# Patient Record
Sex: MALE | Race: WHITE | NOT HISPANIC OR LATINO | Employment: OTHER | URBAN - METROPOLITAN AREA
[De-identification: names, ages, dates, MRNs, and addresses within clinical notes are randomized per-mention and may not be internally consistent; named-entity substitution may affect disease eponyms.]

---

## 2017-05-06 ENCOUNTER — PATIENT OUTREACH (OUTPATIENT)
Dept: HEALTH INFORMATION MANAGEMENT | Facility: OTHER | Age: 67
End: 2017-05-06

## 2017-05-11 DIAGNOSIS — K21.9 GASTROESOPHAGEAL REFLUX DISEASE, ESOPHAGITIS PRESENCE NOT SPECIFIED: ICD-10-CM

## 2017-05-11 DIAGNOSIS — E78.5 DYSLIPIDEMIA: ICD-10-CM

## 2017-05-11 DIAGNOSIS — I10 ESSENTIAL HYPERTENSION: ICD-10-CM

## 2017-05-11 RX ORDER — OMEPRAZOLE 40 MG/1
40 CAPSULE, DELAYED RELEASE ORAL DAILY
Qty: 90 CAP | Refills: 1 | Status: SHIPPED | OUTPATIENT
Start: 2017-05-11 | End: 2018-01-11 | Stop reason: SDUPTHER

## 2017-05-11 RX ORDER — LOSARTAN POTASSIUM AND HYDROCHLOROTHIAZIDE 25; 100 MG/1; MG/1
1 TABLET ORAL DAILY
Qty: 90 TAB | Refills: 1 | Status: SHIPPED | OUTPATIENT
Start: 2017-05-11 | End: 2017-11-27 | Stop reason: SDUPTHER

## 2017-05-11 RX ORDER — SIMVASTATIN 40 MG
40 TABLET ORAL EVERY EVENING
Qty: 90 TAB | Refills: 1 | Status: SHIPPED | OUTPATIENT
Start: 2017-05-11 | End: 2017-11-08

## 2017-05-19 NOTE — PROGRESS NOTES
Outcome: REQUESTED TO CALL US BACK -- Patient going out of town today and requested to call us back. Gave him our phone number. Pt does not have a PCP at this time. Possibly schedule new patient appointment when he call back. Call back in 3 weeks.    WebIZ Checked & Epic Updated:  yes    [facility] Verified: no    Attempt # 2nd

## 2017-10-16 DIAGNOSIS — E78.5 DYSLIPIDEMIA: ICD-10-CM

## 2017-11-08 ENCOUNTER — OFFICE VISIT (OUTPATIENT)
Dept: CARDIOLOGY | Facility: MEDICAL CENTER | Age: 67
End: 2017-11-08
Payer: MEDICARE

## 2017-11-08 VITALS
SYSTOLIC BLOOD PRESSURE: 138 MMHG | BODY MASS INDEX: 29.96 KG/M2 | DIASTOLIC BLOOD PRESSURE: 84 MMHG | HEIGHT: 71 IN | WEIGHT: 214 LBS | HEART RATE: 78 BPM | OXYGEN SATURATION: 93 %

## 2017-11-08 DIAGNOSIS — Z95.5 STENTED CORONARY ARTERY: ICD-10-CM

## 2017-11-08 DIAGNOSIS — I10 ESSENTIAL HYPERTENSION: ICD-10-CM

## 2017-11-08 DIAGNOSIS — E78.5 DYSLIPIDEMIA: ICD-10-CM

## 2017-11-08 PROCEDURE — 99214 OFFICE O/P EST MOD 30 MIN: CPT | Performed by: INTERNAL MEDICINE

## 2017-11-08 RX ORDER — ROSUVASTATIN CALCIUM 20 MG/1
20 TABLET, COATED ORAL EVERY EVENING
Qty: 30 TAB | Refills: 11 | Status: SHIPPED | OUTPATIENT
Start: 2017-11-08 | End: 2018-12-10 | Stop reason: SDUPTHER

## 2017-11-08 ASSESSMENT — ENCOUNTER SYMPTOMS
BRUISES/BLEEDS EASILY: 0
BLURRED VISION: 0
CHILLS: 0
NERVOUS/ANXIOUS: 0
DIZZINESS: 0
DEPRESSION: 0
COUGH: 0
PND: 0
EYE DISCHARGE: 0
HEADACHES: 0
HEARTBURN: 0
MYALGIAS: 0
SHORTNESS OF BREATH: 0
PALPITATIONS: 0
FEVER: 0
NAUSEA: 0

## 2017-11-08 NOTE — PROGRESS NOTES
Subjective:   Rodney Arias is a 67 y.o. male who presents todayFor follow-up for CAD with remote coronary stent.   Currently asymptomatic.  Although he does not come in very often he does take his medications  He does not check blood pressures very often.  Physically active hunting and fishing. No lab work in the last several years.    He has yet to get a colonoscopy    Past Medical History:   Diagnosis Date   • CAD (coronary artery disease) 10/21/2011   • Dyslipidemia 10/21/2011   • HTN (hypertension) 10/21/2011   • MI (myocardial infarction) 10/21/2011   • Myocardial infarction     with stent placement   • Stented coronary artery 10/21/2011     History reviewed. No pertinent surgical history.  History reviewed. No pertinent family history.  History   Smoking Status   • Never Smoker   Smokeless Tobacco   • Never Used     Allergies   Allergen Reactions   • Nkda [No Known Drug Allergy]      Outpatient Encounter Prescriptions as of 11/8/2017   Medication Sig Dispense Refill   • rosuvastatin (CRESTOR) 20 MG Tab Take 1 Tab by mouth every evening. 30 Tab 11   • omeprazole (PRILOSEC) 40 MG delayed-release capsule Take 1 Cap by mouth every day. Needs to be seen for further refills. Thank you 90 Cap 1   • losartan-hydrochlorothiazide (HYZAAR) 100-25 MG per tablet Take 1 Tab by mouth every day. Needs to be seen for further refills. Thank you 90 Tab 1   • Potassium 99 MG TABS Take 1 Tab by mouth every morning.     • aspirin 81 MG tablet Take 81 mg by mouth.     • [DISCONTINUED] simvastatin (ZOCOR) 40 MG Tab Take 1 Tab by mouth every evening. Needs to be seen for further refills. Thank you 90 Tab 1   • Chlorpheniramine Maleate (ALLERGY PO) Take  by mouth.     • nitroglycerin (NITROSTAT) 0.4 MG SL Tab Place 1 Tab under tongue as needed for Chest Pain. Take 1 tab every 5 min x 3, then call 911 for continued pain 25 Tab 2     No facility-administered encounter medications on file as of 11/8/2017.      Review of Systems  "  Constitutional: Negative for chills, fever and malaise/fatigue.   Eyes: Negative for blurred vision and discharge.   Respiratory: Negative for cough and shortness of breath.    Cardiovascular: Negative for chest pain, palpitations, leg swelling and PND.   Gastrointestinal: Negative for heartburn and nausea.   Genitourinary: Negative for dysuria and urgency.   Musculoskeletal: Negative for myalgias.   Skin: Negative for itching and rash.   Neurological: Negative for dizziness and headaches.   Endo/Heme/Allergies: Negative for environmental allergies. Does not bruise/bleed easily.   Psychiatric/Behavioral: Negative for depression. The patient is not nervous/anxious.         Objective:   /84   Pulse 78   Ht 1.803 m (5' 11\")   Wt 97.1 kg (214 lb)   SpO2 93%   BMI 29.85 kg/m²     Physical Exam   Constitutional: He is oriented to person, place, and time. He appears well-developed and well-nourished.   HENT:   Head: Normocephalic and atraumatic.   Eyes: Conjunctivae and EOM are normal. No scleral icterus.   Neck: Neck supple. No JVD present. No thyromegaly present.   Cardiovascular: Normal rate, regular rhythm and normal heart sounds.  Exam reveals no gallop and no friction rub.    No murmur heard.  Pulmonary/Chest: Effort normal and breath sounds normal. No respiratory distress. He has no wheezes. He has no rales. He exhibits no tenderness.   Abdominal: Soft. Bowel sounds are normal. He exhibits no distension and no mass. There is no tenderness.   Neurological: He is alert and oriented to person, place, and time. Coordination normal.   Skin: Skin is warm and dry. No rash noted. No pallor.   Psychiatric: He has a normal mood and affect. His behavior is normal. Judgment and thought content normal.       Assessment:     1. Essential hypertension     2. Stented coronary artery     3. Dyslipidemia  COMP METABOLIC PANEL    LIPID PANEL       Medical Decision Making:  Today's Assessment / Status / Plan:   In an " attempt to lower LDL further, will discontinue simvastatin.  Crestor 20 mg per day prescribed.  Lab work in 2 months.  Suggested occasional home blood pressures with systolic blood pressure target of 135 or less  I gave him the phone number for the GI group so he can schedule a colonoscopy   Follow-up annually

## 2017-11-27 DIAGNOSIS — I10 ESSENTIAL HYPERTENSION: ICD-10-CM

## 2017-11-28 RX ORDER — LOSARTAN POTASSIUM AND HYDROCHLOROTHIAZIDE 25; 100 MG/1; MG/1
1 TABLET ORAL DAILY
Qty: 90 TAB | Refills: 2 | Status: SHIPPED | OUTPATIENT
Start: 2017-11-28 | End: 2018-09-10 | Stop reason: SDUPTHER

## 2018-01-11 DIAGNOSIS — K21.9 GASTROESOPHAGEAL REFLUX DISEASE, ESOPHAGITIS PRESENCE NOT SPECIFIED: ICD-10-CM

## 2018-01-15 RX ORDER — OMEPRAZOLE 40 MG/1
40 CAPSULE, DELAYED RELEASE ORAL DAILY
Qty: 30 CAP | Refills: 0 | Status: SHIPPED | OUTPATIENT
Start: 2018-01-15 | End: 2018-02-26 | Stop reason: SDUPTHER

## 2018-01-18 DIAGNOSIS — E78.5 DYSLIPIDEMIA: ICD-10-CM

## 2018-02-26 DIAGNOSIS — K21.9 GASTROESOPHAGEAL REFLUX DISEASE, ESOPHAGITIS PRESENCE NOT SPECIFIED: ICD-10-CM

## 2018-02-26 RX ORDER — OMEPRAZOLE 40 MG/1
40 CAPSULE, DELAYED RELEASE ORAL DAILY
Qty: 90 CAP | Refills: 3 | Status: SHIPPED | OUTPATIENT
Start: 2018-02-26 | End: 2019-02-26 | Stop reason: SDUPTHER

## 2018-09-10 DIAGNOSIS — I10 ESSENTIAL HYPERTENSION: ICD-10-CM

## 2018-09-11 RX ORDER — LOSARTAN POTASSIUM AND HYDROCHLOROTHIAZIDE 25; 100 MG/1; MG/1
1 TABLET ORAL DAILY
Qty: 90 TAB | Refills: 0 | Status: SHIPPED | OUTPATIENT
Start: 2018-09-11 | End: 2018-12-31 | Stop reason: SDUPTHER

## 2018-12-11 RX ORDER — ROSUVASTATIN CALCIUM 20 MG/1
20 TABLET, COATED ORAL EVERY EVENING
Qty: 180 TAB | Refills: 0 | Status: SHIPPED | OUTPATIENT
Start: 2018-12-11 | End: 2019-02-26 | Stop reason: SDUPTHER

## 2018-12-31 DIAGNOSIS — I10 ESSENTIAL HYPERTENSION: ICD-10-CM

## 2019-01-02 RX ORDER — LOSARTAN POTASSIUM AND HYDROCHLOROTHIAZIDE 25; 100 MG/1; MG/1
1 TABLET ORAL DAILY
Qty: 90 TAB | Refills: 2 | Status: SHIPPED | OUTPATIENT
Start: 2019-01-02 | End: 2019-02-26 | Stop reason: SDUPTHER

## 2019-02-12 DIAGNOSIS — E78.5 DYSLIPIDEMIA: ICD-10-CM

## 2019-02-26 DIAGNOSIS — K21.9 GASTROESOPHAGEAL REFLUX DISEASE, ESOPHAGITIS PRESENCE NOT SPECIFIED: ICD-10-CM

## 2019-02-26 DIAGNOSIS — I10 ESSENTIAL HYPERTENSION: ICD-10-CM

## 2019-02-26 DIAGNOSIS — E78.5 HYPERLIPIDEMIA, UNSPECIFIED HYPERLIPIDEMIA TYPE: ICD-10-CM

## 2019-02-27 RX ORDER — ROSUVASTATIN CALCIUM 20 MG/1
20 TABLET, COATED ORAL EVERY EVENING
Qty: 180 TAB | Refills: 0 | Status: SHIPPED | OUTPATIENT
Start: 2019-02-27 | End: 2019-04-16 | Stop reason: SDUPTHER

## 2019-02-27 RX ORDER — LOSARTAN POTASSIUM AND HYDROCHLOROTHIAZIDE 25; 100 MG/1; MG/1
1 TABLET ORAL DAILY
Qty: 90 TAB | Refills: 0 | Status: SHIPPED | OUTPATIENT
Start: 2019-02-27 | End: 2019-04-16 | Stop reason: SDUPTHER

## 2019-02-27 RX ORDER — OMEPRAZOLE 40 MG/1
40 CAPSULE, DELAYED RELEASE ORAL DAILY
Qty: 90 CAP | Refills: 0 | Status: SHIPPED | OUTPATIENT
Start: 2019-02-27 | End: 2019-06-14 | Stop reason: SDUPTHER

## 2019-04-03 ENCOUNTER — TELEPHONE (OUTPATIENT)
Dept: CARDIOLOGY | Facility: MEDICAL CENTER | Age: 69
End: 2019-04-03

## 2019-04-03 NOTE — TELEPHONE ENCOUNTER
LVM on cell to let me know if he did recent labs and if so where so that I can call for them prior to his appointment with Dr Erwin.

## 2019-04-16 ENCOUNTER — OFFICE VISIT (OUTPATIENT)
Dept: CARDIOLOGY | Facility: MEDICAL CENTER | Age: 69
End: 2019-04-16
Payer: MEDICARE

## 2019-04-16 VITALS
OXYGEN SATURATION: 95 % | BODY MASS INDEX: 29.96 KG/M2 | HEART RATE: 74 BPM | SYSTOLIC BLOOD PRESSURE: 138 MMHG | WEIGHT: 214 LBS | DIASTOLIC BLOOD PRESSURE: 76 MMHG | HEIGHT: 71 IN

## 2019-04-16 DIAGNOSIS — E78.5 DYSLIPIDEMIA: ICD-10-CM

## 2019-04-16 DIAGNOSIS — E78.5 HYPERLIPIDEMIA, UNSPECIFIED HYPERLIPIDEMIA TYPE: ICD-10-CM

## 2019-04-16 DIAGNOSIS — I10 ESSENTIAL HYPERTENSION: ICD-10-CM

## 2019-04-16 DIAGNOSIS — I21.3 ST ELEVATION MYOCARDIAL INFARCTION (STEMI), UNSPECIFIED ARTERY (HCC): ICD-10-CM

## 2019-04-16 DIAGNOSIS — Z95.5 STENTED CORONARY ARTERY: ICD-10-CM

## 2019-04-16 DIAGNOSIS — I25.10 CORONARY ARTERY DISEASE DUE TO LIPID RICH PLAQUE: ICD-10-CM

## 2019-04-16 DIAGNOSIS — I25.83 CORONARY ARTERY DISEASE DUE TO LIPID RICH PLAQUE: ICD-10-CM

## 2019-04-16 PROCEDURE — 99214 OFFICE O/P EST MOD 30 MIN: CPT | Performed by: INTERNAL MEDICINE

## 2019-04-16 RX ORDER — NITROGLYCERIN 0.4 MG/1
0.4 TABLET SUBLINGUAL
COMMUNITY
Start: 2014-06-06 | End: 2019-04-16

## 2019-04-16 RX ORDER — ASPIRIN 81 MG/1
81 TABLET ORAL DAILY
COMMUNITY
Start: 2014-06-06 | End: 2023-11-07

## 2019-04-16 RX ORDER — ROSUVASTATIN CALCIUM 20 MG/1
20 TABLET, COATED ORAL EVERY EVENING
Qty: 90 TAB | Refills: 3 | Status: SHIPPED | OUTPATIENT
Start: 2019-04-16 | End: 2019-06-14 | Stop reason: SDUPTHER

## 2019-04-16 RX ORDER — LOSARTAN POTASSIUM AND HYDROCHLOROTHIAZIDE 25; 100 MG/1; MG/1
1 TABLET ORAL DAILY
Qty: 90 TAB | Refills: 3 | Status: SHIPPED | OUTPATIENT
Start: 2019-04-16 | End: 2019-06-14 | Stop reason: SDUPTHER

## 2019-04-16 RX ORDER — LOSARTAN POTASSIUM AND HYDROCHLOROTHIAZIDE 25; 100 MG/1; MG/1
TABLET ORAL
COMMUNITY
Start: 2014-06-06 | End: 2019-04-16

## 2019-04-16 RX ORDER — NITROGLYCERIN 0.4 MG/1
0.4 TABLET SUBLINGUAL PRN
Qty: 25 TAB | Refills: 2 | Status: SHIPPED | OUTPATIENT
Start: 2019-04-16 | End: 2021-10-14

## 2019-04-16 RX ORDER — OMEPRAZOLE 40 MG/1
40 CAPSULE, DELAYED RELEASE ORAL
COMMUNITY
Start: 2014-06-06 | End: 2019-04-16

## 2019-04-16 RX ORDER — SIMVASTATIN 40 MG
40 TABLET ORAL
COMMUNITY
Start: 2014-06-06 | End: 2019-04-16

## 2019-04-16 NOTE — PATIENT INSTRUCTIONS
Please look into the following diets and incorporate them into your diet    LOW SALT DIET   KEEP YOUR SODIUM EQUAL TO CALORIES AND NO MORE THAN DOUBLE THE CALORIES FOR A LOW SALT DIET    FOR TREATMENT OR PREVENTION OF CORONARY ARTERY DISEASE    [facility] - [facility]    [facility] Reversing [facility] - [facility]a's Cardiologist    [facility]    Dr Gurrola - [place] [facility] (book and documentary)      FOR TREATMENT OF BLOOD PRESSURE  DASH DIET - American Heart Association for treatment of HYPERTENSION    FOR TREATMENT OF BAD CHOLESTEROL/FATS  REDUCE PROCESSED SUGAR AS MUCH AS POSSIBLE  INCREASE WHOLE GRAINS/VEGETABLES    Lowering total cholesterol and LDL (bad) cholesterol:  - Eat leaner cuts of meat, or eliminate altogether if possible red meat, and frequently substitute fish or chicken.  - Limit saturated fat to no more than 7-10% of total calories no more than 10 g per day is recommended. Some sources of saturated fat include butter, animal fats, hydrogenated vegetable fats and oils, many desserts, whole milk dairy products.  - Replaced saturated fats with polyunsaturated fats and monounsaturated fats. Foods high in monounsaturated fat include nuts, although well, canola oil, avocados, and olives.  - Limit trans fat (processed foods) and replaced with fresh fruits and vegetables  - Recommend nonfat dairy products  - Increase substantially the amount of soluble fiber intake (legumes such as beans, fruit, whole grains).  - Consider nutritional supplements: plant sterile spreads such as Benecol, fish oil,  flaxseed oil, omega-3 acids capsules 1000 mg twice a day, or viscous fiber such as Metamucil  - Attain ideal weight and regular exercise (at least 30 minutes per day of walking)    Lowering triglycerides:  - Reduce intake of simple sugar: Desserts, candy, pastries, honey, sodas, sugared cereals, yogurt, Gatorade, sports bars, canned  fruit, smoothies, fruit juice, coffee drinks  - Reduced intake of refined starches: Refined Pasta  - Reduce or abstain from alcohol  - Increase omega-3 fatty acids: [place], Trout, Mackerel, [place]ring, [place] tuna and supplements  - Attain ideal weight and regular exercise (at least 30 minutes per day of walking)      Elevating HDL (good) cholesterol:  - Increase physical activity  - Seasoned foods with garlic and onions  - Increase omega-3 fatty acids and supplements as listed above  - Incorporating appropriate amounts of monounsaturated fats such as nuts, olive oil, canola oil, avocados, olives  - Stop smoking  - Attain ideal weight and regular exercise (at least 30 minutes per day of walking)

## 2019-04-22 ASSESSMENT — ENCOUNTER SYMPTOMS
FOCAL WEAKNESS: 0
BRUISES/BLEEDS EASILY: 0
SORE THROAT: 0
FEVER: 0
SHORTNESS OF BREATH: 0
DIZZINESS: 0
CHILLS: 0
NAUSEA: 0
FALLS: 0
ABDOMINAL PAIN: 0
PND: 0
CLAUDICATION: 0
COUGH: 0
PALPITATIONS: 0
BLURRED VISION: 0
WEAKNESS: 0

## 2019-04-22 NOTE — PROGRESS NOTES
Chief Complaint   Patient presents with   • Coronary Artery Disease     follow up lab results       Subjective:   Rodney Arias is a 68 y.o. male who presents today For follow-up of his history of coronary disease status post stenting he has been doing well over the past year no major issues there     Restarting his medications well no significant chest pain shortness of breath or CHF symptoms    Past Medical History:   Diagnosis Date   • CAD (coronary artery disease) 10/21/2011   • Dyslipidemia 10/21/2011   • HTN (hypertension) 10/21/2011   • MI (myocardial infarction) (HCC) 10/21/2011   • Myocardial infarction (HCC)     with stent placement   • Stented coronary artery 10/21/2011     Past Surgical History:   Procedure Laterality Date   • ANGIOPLASTY     • ZZZ CARDIAC CATH       Family History   Problem Relation Age of Onset   • Heart Disease Neg Hx         no premature CAD     Social History     Social History   • Marital status:      Spouse name: N/A   • Number of children: N/A   • Years of education: N/A     Occupational History   • Not on file.     Social History Main Topics   • Smoking status: Never Smoker   • Smokeless tobacco: Never Used   • Alcohol use Yes      Comment: beer   • Drug use: No   • Sexual activity: Not on file     Other Topics Concern   • Not on file     Social History Narrative   • No narrative on file     Allergies   Allergen Reactions   • Nkda [No Known Drug Allergy]      Outpatient Encounter Prescriptions as of 4/16/2019   Medication Sig Dispense Refill   • aspirin (ASA) 81 MG Chew Tab chewable tablet 81 mg.     • rosuvastatin (CRESTOR) 20 MG Tab Take 1 Tab by mouth every evening. 90 Tab 3   • losartan-hydrochlorothiazide (HYZAAR) 100-25 MG per tablet Take 1 Tab by mouth every day. 90 Tab 3   • nitroglycerin (NITROSTAT) 0.4 MG SL Tab Place 1 Tab under tongue as needed for Chest Pain. Take 1 tab every 5 min x 3, then call 911 for continued pain 25 Tab 2   • omeprazole (PRILOSEC) 40  MG delayed-release capsule Take 1 Cap by mouth every day. For further refills please contact new cardiologist. Thank you 90 Cap 0   • Potassium 99 MG TABS Take 1 Tab by mouth every morning.     • [DISCONTINUED] simvastatin (ZOCOR) 40 MG Tab Take 40 mg by mouth.     • [DISCONTINUED] losartan-hydrochlorothiazide (HYZAAR) 100-25 MG per tablet Take  by mouth.     • [DISCONTINUED] nitroglycerin (NITROSTAT) 0.4 MG SL Tab Place 0.4 mg under tongue.     • [DISCONTINUED] omeprazole (PRILOSEC) 40 MG delayed-release capsule Take 40 mg by mouth.     • [DISCONTINUED] Potassium 99 MG Tab Take  by mouth.     • [DISCONTINUED] losartan-hydrochlorothiazide (HYZAAR) 100-25 MG per tablet Take 1 Tab by mouth every day. For further refills please contact new cardiologist. Thank you 90 Tab 0   • [DISCONTINUED] rosuvastatin (CRESTOR) 20 MG Tab Take 1 Tab by mouth every evening. For further refills please contact new cardiologist. Thank you 180 Tab 0   • [DISCONTINUED] Chlorpheniramine Maleate (ALLERGY PO) Take  by mouth.     • [DISCONTINUED] nitroglycerin (NITROSTAT) 0.4 MG SL Tab Place 1 Tab under tongue as needed for Chest Pain. Take 1 tab every 5 min x 3, then call 911 for continued pain 25 Tab 2   • [DISCONTINUED] aspirin 81 MG tablet Take 81 mg by mouth.       No facility-administered encounter medications on file as of 4/16/2019.      Review of Systems   Constitutional: Negative for chills and fever.   HENT: Negative for sore throat.    Eyes: Negative for blurred vision.   Respiratory: Negative for cough and shortness of breath.    Cardiovascular: Negative for chest pain, palpitations, claudication, leg swelling and PND.   Gastrointestinal: Negative for abdominal pain and nausea.   Musculoskeletal: Negative for falls and joint pain.   Skin: Negative for rash.   Neurological: Negative for dizziness, focal weakness and weakness.   Endo/Heme/Allergies: Does not bruise/bleed easily.        Objective:   /76 (BP Location: Left arm,  "Patient Position: Sitting)   Pulse 74   Ht 1.803 m (5' 11\")   Wt 97.1 kg (214 lb)   SpO2 95%   BMI 29.85 kg/m²     Physical Exam   Constitutional: No distress.   HENT:   Mouth/Throat: Oropharynx is clear and moist. No oropharyngeal exudate.   Eyes: No scleral icterus.   Neck: No JVD present.   Cardiovascular: Normal rate and normal heart sounds.  Exam reveals no gallop and no friction rub.    No murmur heard.  Pulmonary/Chest: No respiratory distress. He has no wheezes. He has no rales.   Abdominal: Soft. Bowel sounds are normal.   Musculoskeletal: He exhibits no edema.   Neurological: He is alert.   Skin: No rash noted. He is not diaphoretic.   Psychiatric: He has a normal mood and affect.     We reviewed in person the recent labs      Assessment:     1. Coronary artery disease due to lipid rich plaque     2. Dyslipidemia     3. Essential hypertension  losartan-hydrochlorothiazide (HYZAAR) 100-25 MG per tablet   4. Stented coronary artery     5. Hyperlipidemia, unspecified hyperlipidemia type  rosuvastatin (CRESTOR) 20 MG Tab   6. ST elevation myocardial infarction (STEMI), unspecified artery (HCC)  nitroglycerin (NITROSTAT) 0.4 MG SL Tab       Medical Decision Making:  Today's Assessment / Status / Plan:     It was my pleasure to meet with Mr. Arias.    Blood pressure is well controlled.      He is on appropriate statin.    I will see Mr. Arias back in 1 year time and encouraged him to follow up with us over the phone or e-mail using my MyChart as issues arise.    It is my pleasure to participate in the care of Mr. Arias.  Please do not hesitate to contact me with questions or concerns.    Demian Erwin MD PhD FACC  Cardiologist [facility]    Please note that this dictation was created using voice recognition software. I have worked with consultants from the vendor as well as technical experts from formerly [facility] to optimize the interface. I have made every " reasonable attempt to correct obvious errors, but I expect that there are errors of grammar and possibly content I did not discover before finalizing the note.

## 2019-06-14 DIAGNOSIS — E78.5 HYPERLIPIDEMIA, UNSPECIFIED HYPERLIPIDEMIA TYPE: ICD-10-CM

## 2019-06-14 DIAGNOSIS — K21.9 GASTROESOPHAGEAL REFLUX DISEASE, ESOPHAGITIS PRESENCE NOT SPECIFIED: ICD-10-CM

## 2019-06-14 DIAGNOSIS — I10 ESSENTIAL HYPERTENSION: ICD-10-CM

## 2019-06-14 RX ORDER — ROSUVASTATIN CALCIUM 20 MG/1
20 TABLET, COATED ORAL EVERY EVENING
Qty: 90 TAB | Refills: 3 | Status: SHIPPED | OUTPATIENT
Start: 2019-06-14 | End: 2019-09-13 | Stop reason: SDUPTHER

## 2019-06-14 RX ORDER — OMEPRAZOLE 40 MG/1
40 CAPSULE, DELAYED RELEASE ORAL DAILY
Qty: 90 CAP | Refills: 3 | Status: SHIPPED | OUTPATIENT
Start: 2019-06-14 | End: 2021-10-08 | Stop reason: SDUPTHER

## 2019-06-14 RX ORDER — LOSARTAN POTASSIUM AND HYDROCHLOROTHIAZIDE 25; 100 MG/1; MG/1
1 TABLET ORAL DAILY
Qty: 90 TAB | Refills: 3 | Status: SHIPPED | OUTPATIENT
Start: 2019-06-14 | End: 2019-09-13 | Stop reason: SDUPTHER

## 2019-09-13 DIAGNOSIS — E78.5 HYPERLIPIDEMIA, UNSPECIFIED HYPERLIPIDEMIA TYPE: ICD-10-CM

## 2019-09-13 DIAGNOSIS — I10 ESSENTIAL HYPERTENSION: ICD-10-CM

## 2019-09-13 RX ORDER — LOSARTAN POTASSIUM AND HYDROCHLOROTHIAZIDE 25; 100 MG/1; MG/1
1 TABLET ORAL DAILY
Qty: 90 TAB | Refills: 0 | Status: SHIPPED | OUTPATIENT
Start: 2019-09-13 | End: 2020-05-15 | Stop reason: SDUPTHER

## 2019-09-13 RX ORDER — ROSUVASTATIN CALCIUM 20 MG/1
20 TABLET, COATED ORAL EVERY EVENING
Qty: 90 TAB | Refills: 0 | Status: SHIPPED | OUTPATIENT
Start: 2019-09-13 | End: 2020-05-15 | Stop reason: SDUPTHER

## 2020-05-15 ENCOUNTER — OFFICE VISIT (OUTPATIENT)
Dept: CARDIOLOGY | Facility: MEDICAL CENTER | Age: 70
End: 2020-05-15
Payer: MEDICARE

## 2020-05-15 VITALS
OXYGEN SATURATION: 96 % | DIASTOLIC BLOOD PRESSURE: 80 MMHG | BODY MASS INDEX: 29.9 KG/M2 | HEIGHT: 71 IN | SYSTOLIC BLOOD PRESSURE: 146 MMHG | WEIGHT: 213.6 LBS | HEART RATE: 86 BPM

## 2020-05-15 DIAGNOSIS — I10 ESSENTIAL HYPERTENSION: ICD-10-CM

## 2020-05-15 DIAGNOSIS — I25.10 CORONARY ARTERY DISEASE DUE TO LIPID RICH PLAQUE: ICD-10-CM

## 2020-05-15 DIAGNOSIS — Z95.5 STENTED CORONARY ARTERY: ICD-10-CM

## 2020-05-15 DIAGNOSIS — E78.5 HYPERLIPIDEMIA, UNSPECIFIED HYPERLIPIDEMIA TYPE: ICD-10-CM

## 2020-05-15 DIAGNOSIS — E78.5 DYSLIPIDEMIA: ICD-10-CM

## 2020-05-15 DIAGNOSIS — I25.83 CORONARY ARTERY DISEASE DUE TO LIPID RICH PLAQUE: ICD-10-CM

## 2020-05-15 PROBLEM — G47.33 OBSTRUCTIVE SLEEP APNEA: Status: ACTIVE | Noted: 2020-05-15

## 2020-05-15 PROCEDURE — 99214 OFFICE O/P EST MOD 30 MIN: CPT | Performed by: INTERNAL MEDICINE

## 2020-05-15 RX ORDER — LOSARTAN POTASSIUM AND HYDROCHLOROTHIAZIDE 25; 100 MG/1; MG/1
1 TABLET ORAL DAILY
Qty: 90 TAB | Refills: 0 | Status: SHIPPED | OUTPATIENT
Start: 2020-05-15 | End: 2020-09-22

## 2020-05-15 RX ORDER — ROSUVASTATIN CALCIUM 20 MG/1
20 TABLET, COATED ORAL EVERY EVENING
Qty: 90 TAB | Refills: 3 | Status: SHIPPED | OUTPATIENT
Start: 2020-05-15 | End: 2021-10-12 | Stop reason: SDUPTHER

## 2020-05-15 ASSESSMENT — ENCOUNTER SYMPTOMS
SORE THROAT: 0
BRUISES/BLEEDS EASILY: 0
FALLS: 0
SHORTNESS OF BREATH: 0
DIZZINESS: 0
BLURRED VISION: 0
ABDOMINAL PAIN: 0
COUGH: 0
PALPITATIONS: 0
CLAUDICATION: 0
PND: 0
WEAKNESS: 0
FEVER: 0
NAUSEA: 0
FOCAL WEAKNESS: 0
CHILLS: 0

## 2020-05-15 NOTE — PROGRESS NOTES
Chief Complaint   Patient presents with   • Follow-Up     CAD       Subjective:   Rodney Arias is a 70 y.o. male who presents today for follow-up of his history of coronary disease status post stenting remotely he has been doing well I see his wife    They travel a lot with RV have been doing well he established with new primary care    Past Medical History:   Diagnosis Date   • CAD (coronary artery disease) 10/21/2011   • Dyslipidemia 10/21/2011   • HTN (hypertension) 10/21/2011   • MI (myocardial infarction) (HCC) 10/21/2011   • Myocardial infarction (HCC)     with stent placement   • Stented coronary artery 10/21/2011     Past Surgical History:   Procedure Laterality Date   • ANGIOPLASTY     • ZZZ CARDIAC CATH       Family History   Problem Relation Age of Onset   • Heart Disease Neg Hx         no premature CAD     Social History     Socioeconomic History   • Marital status:      Spouse name: Not on file   • Number of children: Not on file   • Years of education: Not on file   • Highest education level: Not on file   Occupational History   • Not on file   Social Needs   • Financial resource strain: Not on file   • Food insecurity     Worry: Not on file     Inability: Not on file   • Transportation needs     Medical: Not on file     Non-medical: Not on file   Tobacco Use   • Smoking status: Never Smoker   • Smokeless tobacco: Never Used   Substance and Sexual Activity   • Alcohol use: Yes     Comment: beer   • Drug use: No   • Sexual activity: Not on file   Lifestyle   • Physical activity     Days per week: Not on file     Minutes per session: Not on file   • Stress: Not on file   Relationships   • Social connections     Talks on phone: Not on file     Gets together: Not on file     Attends Judaism service: Not on file     Active member of club or organization: Not on file     Attends meetings of clubs or organizations: Not on file     Relationship status: Not on file   • Intimate partner violence      "Fear of current or ex partner: Not on file     Emotionally abused: Not on file     Physically abused: Not on file     Forced sexual activity: Not on file   Other Topics Concern   • Not on file   Social History Narrative   • Not on file     Allergies   Allergen Reactions   • Nkda [No Known Drug Allergy]      Outpatient Encounter Medications as of 5/15/2020   Medication Sig Dispense Refill   • losartan-hydrochlorothiazide (HYZAAR) 100-25 MG per tablet Take 1 Tab by mouth every day. 90 Tab 0   • rosuvastatin (CRESTOR) 20 MG Tab Take 1 Tab by mouth every evening. 90 Tab 3   • omeprazole (PRILOSEC) 40 MG delayed-release capsule Take 1 Cap by mouth every day. 90 Cap 3   • aspirin (ASA) 81 MG Chew Tab chewable tablet 81 mg.     • nitroglycerin (NITROSTAT) 0.4 MG SL Tab Place 1 Tab under tongue as needed for Chest Pain. Take 1 tab every 5 min x 3, then call 911 for continued pain 25 Tab 2   • Potassium 99 MG TABS Take 1 Tab by mouth every morning.     • [DISCONTINUED] rosuvastatin (CRESTOR) 20 MG Tab Take 1 Tab by mouth every evening. 90 Tab 0   • [DISCONTINUED] losartan-hydrochlorothiazide (HYZAAR) 100-25 MG per tablet Take 1 Tab by mouth every day. 90 Tab 0     No facility-administered encounter medications on file as of 5/15/2020.      Review of Systems   Constitutional: Negative for chills and fever.   HENT: Negative for sore throat.    Eyes: Negative for blurred vision.   Respiratory: Negative for cough and shortness of breath.    Cardiovascular: Negative for chest pain, palpitations, claudication, leg swelling and PND.   Gastrointestinal: Negative for abdominal pain and nausea.   Musculoskeletal: Negative for falls and joint pain.   Skin: Negative for rash.   Neurological: Negative for dizziness, focal weakness and weakness.   Endo/Heme/Allergies: Does not bruise/bleed easily.        Objective:   /80 (BP Location: Left arm, Patient Position: Sitting, BP Cuff Size: Adult)   Pulse 86   Ht 1.803 m (5' 11\")   Wt " 96.9 kg (213 lb 9.6 oz)   SpO2 96%   BMI 29.79 kg/m²     Physical Exam   Constitutional: No distress.   HENT:   Mouth/Throat: Oropharynx is clear and moist. No oropharyngeal exudate.   Eyes: No scleral icterus.   Neck: No JVD present.   Cardiovascular: Normal rate and normal heart sounds. Exam reveals no gallop and no friction rub.   No murmur heard.  Pulmonary/Chest: No respiratory distress. He has no wheezes. He has no rales.   Abdominal: Soft. Bowel sounds are normal.   Musculoskeletal:         General: No edema.   Neurological: He is alert.   Skin: No rash noted. He is not diaphoretic.   Psychiatric: He has a normal mood and affect.     We reviewed in person the most recent labs    Assessment:     1. Essential hypertension  losartan-hydrochlorothiazide (HYZAAR) 100-25 MG per tablet    Comp Metabolic Panel   2. Hyperlipidemia, unspecified hyperlipidemia type  rosuvastatin (CRESTOR) 20 MG Tab   3. Dyslipidemia     4. Coronary artery disease due to lipid rich plaque     5. Stented coronary artery         Medical Decision Making:  Today's Assessment / Status / Plan:     It was my pleasure to meet with Mr. Arias.    Blood pressure is well controlled.  We specifically assessed the labs on hypertension treatment    He is on appropriate statin.    I will see Mr. Arias back in 1 year time and encouraged him to follow up with us over the phone or electronically using my MyChart as issues arise.    It is my pleasure to participate in the care of Mr. Arias.  Please do not hesitate to contact me with questions or concerns.    Demian Erwin MD PhD FAC  Cardiologist [facility]    Please note that this dictation was created using voice recognition software. There may be errors I did not discover before finalizing the note.

## 2020-05-21 ENCOUNTER — HOSPITAL ENCOUNTER (OUTPATIENT)
Dept: LAB | Facility: MEDICAL CENTER | Age: 70
End: 2020-05-21
Attending: INTERNAL MEDICINE
Payer: MEDICARE

## 2020-05-21 ENCOUNTER — TELEPHONE (OUTPATIENT)
Dept: CARDIOLOGY | Facility: MEDICAL CENTER | Age: 70
End: 2020-05-21

## 2020-05-21 ENCOUNTER — HOSPITAL ENCOUNTER (OUTPATIENT)
Dept: LAB | Facility: MEDICAL CENTER | Age: 70
End: 2020-05-21
Attending: STUDENT IN AN ORGANIZED HEALTH CARE EDUCATION/TRAINING PROGRAM
Payer: MEDICARE

## 2020-05-21 DIAGNOSIS — I10 ESSENTIAL HYPERTENSION: ICD-10-CM

## 2020-05-21 LAB
ALBUMIN SERPL BCP-MCNC: 4.6 G/DL (ref 3.2–4.9)
ALBUMIN/GLOB SERPL: 1.8 G/DL
ALP SERPL-CCNC: 55 U/L (ref 30–99)
ALT SERPL-CCNC: 20 U/L (ref 2–50)
ANION GAP SERPL CALC-SCNC: 14 MMOL/L (ref 7–16)
AST SERPL-CCNC: 19 U/L (ref 12–45)
BASOPHILS # BLD AUTO: 1.6 % (ref 0–1.8)
BASOPHILS # BLD: 0.09 K/UL (ref 0–0.12)
BILIRUB SERPL-MCNC: 0.2 MG/DL (ref 0.1–1.5)
BUN SERPL-MCNC: 14 MG/DL (ref 8–22)
CALCIUM SERPL-MCNC: 9.2 MG/DL (ref 8.5–10.5)
CHLORIDE SERPL-SCNC: 98 MMOL/L (ref 96–112)
CO2 SERPL-SCNC: 25 MMOL/L (ref 20–33)
CREAT SERPL-MCNC: 0.87 MG/DL (ref 0.5–1.4)
EOSINOPHIL # BLD AUTO: 0.19 K/UL (ref 0–0.51)
EOSINOPHIL NFR BLD: 3.4 % (ref 0–6.9)
ERYTHROCYTE [DISTWIDTH] IN BLOOD BY AUTOMATED COUNT: 42.2 FL (ref 35.9–50)
EST. AVERAGE GLUCOSE BLD GHB EST-MCNC: 111 MG/DL
FOLATE SERPL-MCNC: 11.9 NG/ML
GLOBULIN SER CALC-MCNC: 2.6 G/DL (ref 1.9–3.5)
GLUCOSE SERPL-MCNC: 93 MG/DL (ref 65–99)
HBA1C MFR BLD: 5.5 % (ref 0–5.6)
HCT VFR BLD AUTO: 47.9 % (ref 42–52)
HGB BLD-MCNC: 15.6 G/DL (ref 14–18)
IMM GRANULOCYTES # BLD AUTO: 0.03 K/UL (ref 0–0.11)
IMM GRANULOCYTES NFR BLD AUTO: 0.5 % (ref 0–0.9)
LYMPHOCYTES # BLD AUTO: 1.88 K/UL (ref 1–4.8)
LYMPHOCYTES NFR BLD: 33.5 % (ref 22–41)
MCH RBC QN AUTO: 29.7 PG (ref 27–33)
MCHC RBC AUTO-ENTMCNC: 32.6 G/DL (ref 33.7–35.3)
MCV RBC AUTO: 91.2 FL (ref 81.4–97.8)
MONOCYTES # BLD AUTO: 0.71 K/UL (ref 0–0.85)
MONOCYTES NFR BLD AUTO: 12.7 % (ref 0–13.4)
NEUTROPHILS # BLD AUTO: 2.71 K/UL (ref 1.82–7.42)
NEUTROPHILS NFR BLD: 48.3 % (ref 44–72)
NRBC # BLD AUTO: 0 K/UL
NRBC BLD-RTO: 0 /100 WBC
PLATELET # BLD AUTO: 239 K/UL (ref 164–446)
PMV BLD AUTO: 9.3 FL (ref 9–12.9)
POTASSIUM SERPL-SCNC: 4 MMOL/L (ref 3.6–5.5)
PROT SERPL-MCNC: 7.2 G/DL (ref 6–8.2)
RBC # BLD AUTO: 5.25 M/UL (ref 4.7–6.1)
SODIUM SERPL-SCNC: 137 MMOL/L (ref 135–145)
T4 FREE SERPL-MCNC: 1.16 NG/DL (ref 0.93–1.7)
TSH SERPL DL<=0.005 MIU/L-ACNC: 9.28 UIU/ML (ref 0.38–5.33)
VIT B12 SERPL-MCNC: 385 PG/ML (ref 211–911)
WBC # BLD AUTO: 5.6 K/UL (ref 4.8–10.8)

## 2020-05-21 PROCEDURE — 80053 COMPREHEN METABOLIC PANEL: CPT

## 2020-05-21 PROCEDURE — 85025 COMPLETE CBC W/AUTO DIFF WBC: CPT

## 2020-05-21 PROCEDURE — 36415 COLL VENOUS BLD VENIPUNCTURE: CPT

## 2020-05-21 PROCEDURE — 83036 HEMOGLOBIN GLYCOSYLATED A1C: CPT | Mod: GA

## 2020-05-21 PROCEDURE — 82746 ASSAY OF FOLIC ACID SERUM: CPT

## 2020-05-21 PROCEDURE — 84439 ASSAY OF FREE THYROXINE: CPT

## 2020-05-21 PROCEDURE — 82607 VITAMIN B-12: CPT

## 2020-05-21 PROCEDURE — 84443 ASSAY THYROID STIM HORMONE: CPT

## 2020-05-21 NOTE — TELEPHONE ENCOUNTER
Returned Kita's phone call and reviewed findings.  She had pointed out the expected date for draw was 11/15/2020.  Reviewed labs in chart as last labs were drawn 04/2019.  She verbalizes understanding and states no other concerns or questions at this time.  She is appreciative of information given.

## 2020-05-21 NOTE — TELEPHONE ENCOUNTER
"CW/hemant [facility] calling to report had draw at 6:30 this morning. Pt told Kita CW wanted this draw done \"anytime he wants\", Kita is asking for clarification of when CW intended for pt to be tested, now or a week prior to next appt, which has not yet been scheduled.  She will discard specimen if necessary.    Please call Kita to clarify instructions, [phone] until 2:30 today    "

## 2020-05-26 ENCOUNTER — TELEPHONE (OUTPATIENT)
Dept: CARDIOLOGY | Facility: MEDICAL CENTER | Age: 70
End: 2020-05-26

## 2020-05-26 NOTE — TELEPHONE ENCOUNTER
Associated Results   Result Notes for Comp Metabolic Panel   Notes recorded by Demian Erwin M.D. on 5/22/2020 at 10:17 AM PDT     Labs look good, please let him know     Thank you      Letter printed with MD recommendations and mailed to pt mailing address.

## 2020-09-19 DIAGNOSIS — I10 ESSENTIAL HYPERTENSION: ICD-10-CM

## 2020-09-22 RX ORDER — LOSARTAN POTASSIUM AND HYDROCHLOROTHIAZIDE 25; 100 MG/1; MG/1
TABLET ORAL
Qty: 90 TAB | Refills: 3 | Status: SHIPPED | OUTPATIENT
Start: 2020-09-22 | End: 2021-11-17 | Stop reason: SDUPTHER

## 2021-01-15 DIAGNOSIS — Z23 NEED FOR VACCINATION: ICD-10-CM

## 2021-04-28 ENCOUNTER — NURSE TRIAGE (OUTPATIENT)
Dept: HEALTH INFORMATION MANAGEMENT | Facility: OTHER | Age: 71
End: 2021-04-28

## 2021-04-28 NOTE — TELEPHONE ENCOUNTER
"Pt has been experiencing fatigue for about 3 months but it has progressively been getting a little worse. Advised PCP visit within 24 hours or UC.    Reason for Disposition  • MODERATE weakness (i.e., interferes with work, school, normal activities) and cause unknown    Additional Information  • Negative: SEVERE difficulty breathing (e.g., struggling for each breath, speaks in single words)  • Negative: Shock suspected (e.g., cold/pale/clammy skin, too weak to stand, low BP, rapid pulse)  • Negative: Difficult to awaken or acting confused (e.g., disoriented, slurred speech)  • Negative: Fainted > 15 minutes ago and still feels too weak or dizzy to stand  • Negative: SEVERE weakness (i.e., unable to walk or barely able to walk, requires support) and new onset or worsening  • Negative: Sounds like a life-threatening emergency to the triager  • Negative: Weakness of the face, arm or leg on one side of the body  • Negative: Has diabetes and weakness from low blood sugar (i.e., < 60 mg/dL or 3.5 mmol/L)  • Negative: Recent heat exposure, suspected cause of weakness  • Negative: Vomiting is the main symptom  • Negative: Diarrhea is the main symptom  • Negative: Difficulty breathing  • Negative: Heart beating < 50 beats per minute OR > 140 beats per minute  • Negative: Extra heartbeats OR irregular heart beating (i.e., 'palpitations')  • Negative: Follows bleeding (e.g., from vomiting, rectum, vagina)  • Negative: Bloody, black, or tarry bowel movements  • Negative: MODERATE weakness from poor fluid intake with no improvement after 2 hours of rest and fluids  • Negative: Drinking very little and dehydration suspected (e.g., no urine > 12 hours, very dry mouth, very lightheaded)  • Negative: Patient sounds very sick or weak to the triager    Answer Assessment - Initial Assessment Questions  1. DESCRIPTION: \"Describe how you are feeling.\"      fatigue  2. SEVERITY: \"How bad is it?\"  \"Can you stand and walk?\"    - MILD - " "Feels weak or tired, but does not interfere with work, school or normal activities    - MODERATE - Able to stand and walk; weakness interferes with work, school, or normal activities    - SEVERE - Unable to stand or walk      moderate  3. ONSET:  \"When did the weakness begin?\"      3 months  4. CAUSE: \"What do you think is causing the weakness?\"      unknown  5. MEDICINES: \"Have you recently started a new medicine or had a change in the amount of a medicine?\"      no  6. OTHER SYMPTOMS: \"Do you have any other symptoms?\" (e.g., chest pain, fever, cough, SOB, vomiting, diarrhea, bleeding, other areas of pain)      Sometimes gets winded  7. PREGNANCY: \"Is there any chance you are pregnant?\" \"When was your last menstrual period?\"      no    Protocols used: WEAKNESS (GENERALIZED) AND FATIGUE-A-OH      "

## 2021-05-18 ENCOUNTER — TELEPHONE (OUTPATIENT)
Dept: CARDIOLOGY | Facility: MEDICAL CENTER | Age: 71
End: 2021-05-18

## 2021-05-18 DIAGNOSIS — I25.83 CORONARY ARTERY DISEASE DUE TO LIPID RICH PLAQUE: ICD-10-CM

## 2021-05-18 DIAGNOSIS — I25.10 CORONARY ARTERY DISEASE DUE TO LIPID RICH PLAQUE: ICD-10-CM

## 2021-05-18 NOTE — TELEPHONE ENCOUNTER
CW    Dr Gonzales called to speak with CW regarding a test she ordered. Patient told her CW wanted to discuss the test prior to patient getting it done.      Thank you,    Ny AGUILAR

## 2021-05-19 NOTE — TELEPHONE ENCOUNTER
Attempted to call pt, unable to reach as voicemail is full on home number.  Left voicemail to return this call to discuss MD recommendations.

## 2021-05-19 NOTE — TELEPHONE ENCOUNTER
Spoke with Dr Gonzales.  Pt should get an ischemic eval but needs imaging, I ordered a treadmill nuc    Also he should follow through with sleep study    Please let him know    Thank you

## 2021-05-20 ENCOUNTER — OFFICE VISIT (OUTPATIENT)
Dept: CARDIOLOGY | Facility: MEDICAL CENTER | Age: 71
End: 2021-05-20
Payer: MEDICARE

## 2021-05-20 VITALS
BODY MASS INDEX: 31.64 KG/M2 | HEART RATE: 75 BPM | HEIGHT: 70 IN | WEIGHT: 221 LBS | DIASTOLIC BLOOD PRESSURE: 70 MMHG | SYSTOLIC BLOOD PRESSURE: 134 MMHG | OXYGEN SATURATION: 95 %

## 2021-05-20 DIAGNOSIS — I10 ESSENTIAL HYPERTENSION: ICD-10-CM

## 2021-05-20 DIAGNOSIS — I25.10 CORONARY ARTERY DISEASE DUE TO LIPID RICH PLAQUE: ICD-10-CM

## 2021-05-20 DIAGNOSIS — Z95.5 STENTED CORONARY ARTERY: ICD-10-CM

## 2021-05-20 DIAGNOSIS — I21.02 MYOCARDIAL INFARCTION INVOLVING LEFT ANTERIOR DESCENDING (LAD) CORONARY ARTERY, UNSPECIFIED MI TYPE (HCC): ICD-10-CM

## 2021-05-20 DIAGNOSIS — I25.83 CORONARY ARTERY DISEASE DUE TO LIPID RICH PLAQUE: ICD-10-CM

## 2021-05-20 DIAGNOSIS — E78.5 DYSLIPIDEMIA: ICD-10-CM

## 2021-05-20 PROCEDURE — 99214 OFFICE O/P EST MOD 30 MIN: CPT | Performed by: INTERNAL MEDICINE

## 2021-05-20 RX ORDER — LEVOTHYROXINE SODIUM 112 UG/1
TABLET ORAL
COMMUNITY
Start: 2021-04-15 | End: 2021-10-14

## 2021-05-20 RX ORDER — FAMOTIDINE 20 MG/1
TABLET, FILM COATED ORAL
COMMUNITY
Start: 2021-03-26 | End: 2022-04-12

## 2021-05-20 ASSESSMENT — ENCOUNTER SYMPTOMS
NAUSEA: 0
FOCAL WEAKNESS: 0
FALLS: 0
SORE THROAT: 0
BLURRED VISION: 0
DIZZINESS: 0
PALPITATIONS: 0
WEAKNESS: 0
BRUISES/BLEEDS EASILY: 0
COUGH: 0
CLAUDICATION: 0
SHORTNESS OF BREATH: 0
CHILLS: 0
FEVER: 0
ABDOMINAL PAIN: 0
PND: 0

## 2021-05-20 ASSESSMENT — FIBROSIS 4 INDEX: FIB4 SCORE: 1.3

## 2021-05-20 NOTE — LETTER
PROCEDURE/SURGERY CLEARANCE FORM      Encounter Date: 5/20/2021    Patient: Rodney Arias  YOB: 1950    CARDIOLOGIST:  Demian Erwin M.D.    The above patient is cleared to have the following procedure/surgery: stress test                                           It is my pleasure to participate in the care of Mr. Arias.  Please do not hesitate to contact me with questions or concerns. [facility] is available 24/7 for consultative services at [phone] in the perioperative period.    Electronically Signed    Demian Erwin MD PhD [facility]  Cardiologist [facility]    Please note that this dictation was created using voice recognition software. There may be errors I did not discover before finalizing the note.

## 2021-05-20 NOTE — PROGRESS NOTES
Chief Complaint   Patient presents with   • HTN (Controlled)   • Coronary Artery Disease   • MI (Non ST Segment Elevation MI)   • Dyslipidemia       Subjective:   Rodney Arias is a 71 y.o. male who presents today for follow-up of his history of coronary status post stenting remotely    He has been doing well over the past year tolerating his medications well    He is describing fatigue in the afternoon and had arranged a stress test for tomorrow    Past Medical History:   Diagnosis Date   • CAD (coronary artery disease) 10/21/2011   • Dyslipidemia 10/21/2011   • HTN (hypertension) 10/21/2011   • MI (myocardial infarction) (HCC) 10/21/2011   • Myocardial infarction (HCC)     with stent placement   • Stented coronary artery 10/21/2011     Past Surgical History:   Procedure Laterality Date   • ANGIOPLASTY     • ZZZ CARDIAC CATH       Family History   Problem Relation Age of Onset   • Heart Disease Neg Hx         no premature CAD     Social History     Socioeconomic History   • Marital status:      Spouse name: Not on file   • Number of children: Not on file   • Years of education: Not on file   • Highest education level: Not on file   Occupational History   • Not on file   Tobacco Use   • Smoking status: Never Smoker   • Smokeless tobacco: Never Used   Vaping Use   • Vaping Use: Never used   Substance and Sexual Activity   • Alcohol use: Yes     Comment: beer   • Drug use: No   • Sexual activity: Not on file   Other Topics Concern   • Not on file   Social History Narrative   • Not on file     Social Determinants of Health     Financial Resource Strain:    • Difficulty of Paying Living Expenses:    Food Insecurity:    • Worried About Running Out of Food in the Last Year:    • Ran Out of Food in the Last Year:    Transportation Needs:    • Lack of Transportation (Medical):    • Lack of Transportation (Non-Medical):    Physical Activity:    • Days of Exercise per Week:    • Minutes of Exercise per Session:     Stress:    • Feeling of Stress :    Social Connections:    • Frequency of Communication with Friends and Family:    • Frequency of Social Gatherings with Friends and Family:    • Attends [employer] Services:    • Active Member of Clubs or Organizations:    • Attends Club or Organization Meetings:    • Marital Status:    Intimate Partner Violence:    • Fear of Current or Ex-Partner:    • Emotionally Abused:    • Physically Abused:    • Sexually Abused:      Allergies   Allergen Reactions   • Nkda [No Known Drug Allergy]      Outpatient Encounter Medications as of 5/20/2021   Medication Sig Dispense Refill   • levothyroxine (SYNTHROID) 112 MCG Tab LEVOTHYROXINE SODIUM 112 MCG TABS     • b complex vitamins tablet B COMPLEX TABS     • losartan-hydrochlorothiazide (HYZAAR) 100-25 MG per tablet TAKE 1 TABLET BY MOUTH EVERY DAY 90 Tab 3   • rosuvastatin (CRESTOR) 20 MG Tab Take 1 Tab by mouth every evening. 90 Tab 3   • omeprazole (PRILOSEC) 40 MG delayed-release capsule Take 1 Cap by mouth every day. 90 Cap 3   • aspirin (ASA) 81 MG Chew Tab chewable tablet 81 mg.     • Potassium 99 MG TABS Take 1 Tab by mouth every morning.     • famotidine (PEPCID) 20 MG Tab TAKE 1 TABLET BY MOUTH DAILY     • nitroglycerin (NITROSTAT) 0.4 MG SL Tab Place 1 Tab under tongue as needed for Chest Pain. Take 1 tab every 5 min x 3, then call 911 for continued pain 25 Tab 2     No facility-administered encounter medications on file as of 5/20/2021.     Review of Systems   Constitutional: Negative for chills and fever.   HENT: Negative for sore throat.    Eyes: Negative for blurred vision.   Respiratory: Negative for cough and shortness of breath.    Cardiovascular: Negative for chest pain, palpitations, claudication, leg swelling and PND.   Gastrointestinal: Negative for abdominal pain and nausea.   Musculoskeletal: Negative for falls and joint pain.   Skin: Negative for rash.   Neurological: Negative for dizziness, focal weakness and  "weakness.   Endo/Heme/Allergies: Does not bruise/bleed easily.        Objective:   /70 (BP Location: Left arm, Patient Position: Sitting, BP Cuff Size: Adult)   Pulse 75   Ht 1.778 m (5' 10\")   Wt 100 kg (221 lb)   SpO2 95%   BMI 31.71 kg/m²     Physical Exam   Constitutional: No distress.   Eyes: No scleral icterus.   Neck: No JVD present.   Cardiovascular: Normal rate and normal heart sounds. Exam reveals no gallop and no friction rub.   No murmur heard.  Pulmonary/Chest: No respiratory distress. He has no wheezes. He has no rales.   Abdominal: Soft. Bowel sounds are normal.   Neurological: He is alert.   Skin: No rash noted. He is not diaphoretic.     We reviewed in person the most recent labs      Assessment:     1. Coronary artery disease due to lipid rich plaque  NM-CARDIAC STRESS TEST   2. Dyslipidemia     3. Essential hypertension     4. Stented coronary artery     5. Myocardial infarction involving left anterior descending (LAD) coronary artery, unspecified MI type (HCC)         Medical Decision Making:  Today's Assessment / Status / Plan:     It was my pleasure to meet with Mr. Arias.    We addressed the management of hypertension at today's visit. Blood pressure is well controlled.  We specifically assessed the labs on hypertension treatment    We addressed the management of dyslipidemia at today's visit. He is on appropriate statin.    Reasonable to screen for change in his coronary disease with his fatigue in the afternoons I also agree with primary care assessment that may be due to sleep apnea he is on treatment    I will see Mr. Arias back in 1 year time and encouraged him to follow up with us over the phone or electronically using my MyChart as issues arise.    It is my pleasure to participate in the care of Mr. Arias.  Please do not hesitate to contact me with questions or concerns.    Demian Erwin MD PhD FAC  Cardiologist [facility]    Please note that this dictation was created using voice recognition software. There may be errors I did not discover before finalizing the note.

## 2021-05-25 ENCOUNTER — TELEPHONE (OUTPATIENT)
Dept: CARDIOLOGY | Facility: MEDICAL CENTER | Age: 71
End: 2021-05-25

## 2021-06-01 ENCOUNTER — TELEPHONE (OUTPATIENT)
Dept: CARDIOLOGY | Facility: MEDICAL CENTER | Age: 71
End: 2021-06-01

## 2021-06-01 NOTE — TELEPHONE ENCOUNTER
CW    Pt called asking if CW could call him back to discuss the NM cardiac stress test that is scheduled for Friday June 4th.   Ph# [phone].    Thank you

## 2021-06-02 NOTE — TELEPHONE ENCOUNTER
"Returned pt call and reviewed pt concerns.  Pt states, \"I'm having problems with my left knee and I don't think I can get on the treadmill at this time.  It hurts one minute the next it's fine.  I was in a brace before.\"  Reassurance given that findings will be relayed to MD for advise on alternative testing.  Pt replied PCP already ordered chemical stress test the day after last OV and was waiting for someone to call back with results.  Advised pt to follow up with PCP as we did not order testing.  Reassurance given that findings will be relayed to MD if he has access to results as MD is not part of [facility].  He verbalizes understanding and states no other concerns or questions at this time.  Pt is appreciative of information given.    Findings relayed to MD for advise.  "

## 2021-06-02 NOTE — TELEPHONE ENCOUNTER
PT called back, they canceled the appt on Friday but still wants to to to you about this.    Thank you,  Shireen MENDOZA

## 2021-06-03 NOTE — TELEPHONE ENCOUNTER
Fax sent to [facility] to request for testing to be faxed to this office, [phone], completed status.     Awaiting for test results to be received.

## 2021-06-03 NOTE — TELEPHONE ENCOUNTER
Please get the report from [facility]   Non-Graft Cartilage Fenestration Text: The cartilage was fenestrated with a 2mm punch biopsy to help facilitate healing.

## 2021-06-04 ENCOUNTER — APPOINTMENT (OUTPATIENT)
Dept: RADIOLOGY | Facility: MEDICAL CENTER | Age: 71
End: 2021-06-04
Attending: INTERNAL MEDICINE
Payer: MEDICARE

## 2021-06-14 DIAGNOSIS — I25.83 CORONARY ARTERY DISEASE DUE TO LIPID RICH PLAQUE: ICD-10-CM

## 2021-06-14 DIAGNOSIS — I25.10 CORONARY ARTERY DISEASE DUE TO LIPID RICH PLAQUE: ICD-10-CM

## 2021-06-15 NOTE — TELEPHONE ENCOUNTER
Received notification of document scanned to pt chart, media tab.    Request to review testing relayed to MD for advise.

## 2021-10-08 DIAGNOSIS — K21.9 GASTROESOPHAGEAL REFLUX DISEASE: ICD-10-CM

## 2021-10-08 RX ORDER — OMEPRAZOLE 40 MG/1
40 CAPSULE, DELAYED RELEASE ORAL DAILY
Qty: 90 CAPSULE | Refills: 0 | Status: SHIPPED | OUTPATIENT
Start: 2021-10-08 | End: 2022-01-07

## 2021-10-08 NOTE — TELEPHONE ENCOUNTER
Patient's spouse lvm 10/08 stating patient is out of omeprazole and needs it by this weekend and needs it sent to the [facility] on [address]

## 2021-10-08 NOTE — TELEPHONE ENCOUNTER
Last seen: 05/17/21 by Dr. Meek  Next appt: None     Was the patient seen in the last year in this department? Yes   Does patient have an active prescription for medications requested? No   Received Request Via: Pharmacy

## 2021-10-12 DIAGNOSIS — E78.5 HYPERLIPIDEMIA, UNSPECIFIED HYPERLIPIDEMIA TYPE: ICD-10-CM

## 2021-10-13 RX ORDER — ROSUVASTATIN CALCIUM 20 MG/1
20 TABLET, COATED ORAL EVERY EVENING
Qty: 90 TABLET | Refills: 3 | Status: SHIPPED | OUTPATIENT
Start: 2021-10-13 | End: 2022-09-12 | Stop reason: SDUPTHER

## 2021-10-14 ENCOUNTER — OFFICE VISIT (OUTPATIENT)
Dept: INTERNAL MEDICINE | Facility: OTHER | Age: 71
End: 2021-10-14
Payer: MEDICARE

## 2021-10-14 VITALS
DIASTOLIC BLOOD PRESSURE: 84 MMHG | BODY MASS INDEX: 31.11 KG/M2 | WEIGHT: 222.2 LBS | TEMPERATURE: 98 F | HEART RATE: 78 BPM | HEIGHT: 71 IN | OXYGEN SATURATION: 95 % | SYSTOLIC BLOOD PRESSURE: 166 MMHG

## 2021-10-14 DIAGNOSIS — G47.33 OBSTRUCTIVE SLEEP APNEA: ICD-10-CM

## 2021-10-14 DIAGNOSIS — Z78.9 ALCOHOL USE: ICD-10-CM

## 2021-10-14 DIAGNOSIS — E66.9 OBESITY (BMI 30.0-34.9): ICD-10-CM

## 2021-10-14 DIAGNOSIS — K21.9 GASTROESOPHAGEAL REFLUX DISEASE, UNSPECIFIED WHETHER ESOPHAGITIS PRESENT: ICD-10-CM

## 2021-10-14 DIAGNOSIS — I25.83 CORONARY ARTERY DISEASE DUE TO LIPID RICH PLAQUE: ICD-10-CM

## 2021-10-14 DIAGNOSIS — I10 ESSENTIAL HYPERTENSION: ICD-10-CM

## 2021-10-14 DIAGNOSIS — E78.5 DYSLIPIDEMIA: ICD-10-CM

## 2021-10-14 DIAGNOSIS — Z12.12 SCREENING FOR COLORECTAL CANCER: ICD-10-CM

## 2021-10-14 DIAGNOSIS — E03.9 HYPOTHYROIDISM (ACQUIRED): ICD-10-CM

## 2021-10-14 DIAGNOSIS — I25.10 CORONARY ARTERY DISEASE DUE TO LIPID RICH PLAQUE: ICD-10-CM

## 2021-10-14 DIAGNOSIS — Z12.11 SCREENING FOR COLORECTAL CANCER: ICD-10-CM

## 2021-10-14 PROBLEM — F10.90 ALCOHOL USE: Status: ACTIVE | Noted: 2021-10-14

## 2021-10-14 PROCEDURE — 99214 OFFICE O/P EST MOD 30 MIN: CPT | Mod: GC | Performed by: STUDENT IN AN ORGANIZED HEALTH CARE EDUCATION/TRAINING PROGRAM

## 2021-10-14 RX ORDER — LEVOTHYROXINE SODIUM 112 UG/1
112 TABLET ORAL
Qty: 90 TABLET | Refills: 0 | Status: SHIPPED | OUTPATIENT
Start: 2021-10-14 | End: 2022-01-07

## 2021-10-14 RX ORDER — ROSUVASTATIN CALCIUM 20 MG/1
TABLET, COATED ORAL
COMMUNITY
Start: 2021-06-21 | End: 2021-10-14

## 2021-10-14 RX ORDER — LEVOTHYROXINE SODIUM 112 UG/1
TABLET ORAL
COMMUNITY
Start: 2021-04-15 | End: 2021-10-14

## 2021-10-14 ASSESSMENT — PATIENT HEALTH QUESTIONNAIRE - PHQ9: CLINICAL INTERPRETATION OF PHQ2 SCORE: 0

## 2021-10-14 ASSESSMENT — FIBROSIS 4 INDEX: FIB4 SCORE: 1.3

## 2021-10-14 NOTE — PATIENT INSTRUCTIONS
Please keep track of your blood pressure morning and evening readings after relaxing for 5 to 10 minutes prior to checking and keep a log for approximately 1 to 2 weeks and let us know what your blood pressures running. Continue the current medication for blood pressure for now, but may need to make adjustments if your blood pressure is not at goal of less than 140/90  Try to see Dr. Erwin the cardiologist prior to going to [place] and ask if you would benefit from a beta-blocker for your coronary artery disease  Your thyroid function is doing well and I will refill your thyroid medication  I would like you to get a CBC and a CMP labs at your convenience  We will refer you for a sleep study to see if you have sleep apnea and would benefit from a device to get more restful sleep.  Untreated sleep apnea can accelerate cardiovascular and pulmonary disease.    Please try to limit alcohol to 1 or 2 drinks daily no more than 14 drinks a week and consider having a drinks earlier in the day or early evening and not so close to bedtime as it may be interfering with her sleep     Get in touch with your gastroenterologist to set up a colonoscopy     Commend a 2-week nursing visit blood pressure follow-up  We will follow-up in April when you return from your trip

## 2021-10-15 NOTE — ASSESSMENT & PLAN NOTE
States he is compliant with his rosuvastatin  Tolerating medication well, will continue current regimen

## 2021-10-15 NOTE — PROGRESS NOTES
Established Patient    Rodney presents today with the following:    CC: Follow-up appointment    HPI: 71-year-old male presents for follow-up.  No acute complaints today.  He has a history of coronary disease status post stent in 1998 and is followed by cardiology annually.  He is compliant with his statin, ARB, and aspirin.  Tolerating medications well.  His thyroid function returned in a good range on his current Synthroid regimen.  Patient has been having trouble with waking up frequently in the middle of the night and sometimes waking up at 3 or 4 AM and not being able to go back to sleep.  He drinks 3-4 beers daily and sometimes close to bedtime.  He has also been diagnosed with obstructive sleep apnea in the past but did not continue treatment.  As result he feels fatigued during the day.  He has not been checking his blood pressure at home, but his blood pressure was elevated in the office today.  He had a colonoscopy 2 years ago and was advised to come back in 3 years.  Advised him to call and make an arrangement for a repeat screening colonoscopy and also placed a referral today.    Patient Active Problem List    Diagnosis Date Noted   • Alcohol use 10/14/2021   • Hypothyroidism (acquired) 10/14/2021   • Obstructive sleep apnea 05/15/2020   • Hypoxia 04/04/2013   • GERD (gastroesophageal reflux disease) 03/28/2013   • VERTIGO, BENIGN PAROXYSMAL 03/28/2013   • Coronary artery disease due to lipid rich plaque 10/21/2011   • MI (myocardial infarction) (HCC) 10/21/2011   • Stented coronary artery 10/21/2011   • Essential hypertension 10/21/2011   • Dyslipidemia 10/21/2011       Current Outpatient Medications   Medication Sig Dispense Refill   • levothyroxine (SYNTHROID) 112 MCG Tab Take 1 Tablet by mouth every morning on an empty stomach for 90 days. 90 Tablet 0   • rosuvastatin (CRESTOR) 20 MG Tab Take 1 Tablet by mouth every evening. 90 Tablet 3   • omeprazole (PRILOSEC) 40 MG delayed-release capsule  "Take 1 Capsule by mouth every day. 90 Capsule 0   • b complex vitamins tablet B COMPLEX TABS     • famotidine (PEPCID) 20 MG Tab TAKE 1 TABLET BY MOUTH DAILY     • losartan-hydrochlorothiazide (HYZAAR) 100-25 MG per tablet TAKE 1 TABLET BY MOUTH EVERY DAY 90 Tab 3   • aspirin (ASA) 81 MG Chew Tab chewable tablet 81 mg.     • Potassium 99 MG TABS Take 1 Tab by mouth every morning.       No current facility-administered medications for this visit.       ROS: As per HPI. Additional pertinent systems as noted below.  Constitutional: Negative for chills and fever.   HENT: Negative for ear pain and sore throat.    Eyes: Negative for discharge and redness.   Respiratory: Negative for cough, hemoptysis, wheezing and stridor.    Cardiovascular: Negative for chest pain, palpitations and leg swelling.   Gastrointestinal: Negative for abdominal pain, constipation, diarrhea, heartburn, nausea and vomiting. Heartburn well controlled on PPI  Genitourinary: Negative for dysuria, flank pain and hematuria.   Musculoskeletal: Negative for falls and myalgias.   Skin: Negative for itching and rash.   Neurological: Negative for dizziness, seizures, loss of consciousness and headaches.   Endo/Heme/Allergies: Negative for polydipsia. Does not bruise/bleed easily.   Psychiatric/Behavioral: Positive for alcohol abuse      BP (!) 166/84 (BP Location: Right arm, Patient Position: Sitting, BP Cuff Size: Adult)   Pulse 78   Temp 36.7 °C (98 °F)   Ht 1.803 m (5' 11\")   Wt 101 kg (222 lb 3.2 oz)   SpO2 95%   BMI 30.99 kg/m²     Physical Exam   Constitutional:  oriented to person, place, and time. No distress.   Eyes: extraocular motion intact. No scleral icterus.  Neck: Neck supple. No thyromegaly present.   Cardiovascular: Normal rate, regular rhythm and normal heart sounds.  Exam reveals no gallop and no friction rub.  No murmur heard.  Pulmonary/Chest: Breath sounds normal, no wheezing, rales, rhonchi  Musculoskeletal:   no edema. "   Neurological: alert and oriented to person, place, and time.   Skin: No cyanosis. Nails show no clubbing.    Note: I have reviewed all pertinent labs and diagnostic tests associated with this visit with specific comments listed under the assessment and plan below    Assessment and Plan    Alcohol use  She drinks 3-4 beers daily.  Sometimes he drinks close to bedtime.  He has been having issues with sleep.  He has come down from 5-6 beers previously.    Plan  Get a patient that recommended drinking as 1-2 drinks per day for male in no more than 14 drinks a week and advised that he is going to drink alcohol to have it earlier in the day and not close to bedtime to help with his sleep  Educated him on potential of liver disease and poor sleep related to alcohol use    Obstructive sleep apnea  Patient was diagnosed with NGHIA in the past and used CPAP device for short period of time.  However he recently had an incomplete home sleep study.  He is interested in repeating a sleep study and considering treatment as he is had daytime fatigue and elevated blood pressure.    Plan  We will refer patient for a lab sleep study    GERD (gastroesophageal reflux disease)  Symptoms controlled on omeprazole and will continue current regiment    Dyslipidemia  States he is compliant with his rosuvastatin  Tolerating medication well, will continue current regimen    Essential hypertension  Blood pressure elevated in the office today on 2 readings ranging 160s-170s systolic and 80s diastolic.  He has not been checking at home.  He is compliant with his losartan-hydrochlorothiazide combination.    Plan  Continue current medication and advised patient to keep a log of his blood pressure morning and evening for the next 2 weeks and then come back for separate nursing visit to reassess his blood pressure and also bring his pressure device for comparison to our in office device  Goal less than 130/80    Coronary artery disease due to lipid  rich plaque  Patient is taking daily aspirin, rosuvastatin, losartan-hydrochlorothiazide, and follows up with cardiology yearly.  He sees Dr. Erwin.    Plan  Advised patient to make his annual appointment with cardiology  Advised him to ask cardiologist if would be appropriate to start a beta-blocker given his coronary disease and elevated blood pressure  We will have him monitor his blood pressure closely at home and bring him back for a nursing blood pressure follow-up visit at which time we will need to adjust his medications as appropriate    Hypothyroidism (acquired)  Patient is taking Synthroid 112 MCG's daily.  His last TSH on 10/6/2021 was 6.180.  Free T4 was 1.43.  We will continue with current regiment      1. Essential hypertension  CBC WITH DIFFERENTIAL    Comp Metabolic Panel   2. Obstructive sleep apnea  Polysomnography, 4 or More    REFERRAL TO PULMONARY AND SLEEP MEDICINE   3. Coronary artery disease due to lipid rich plaque  Initial Annual Wellness Visit - Includes PPPS ()   4. Dyslipidemia  Initial Annual Wellness Visit - Includes PPPS ()   5. Gastroesophageal reflux disease, unspecified whether esophagitis present  Initial Annual Wellness Visit - Includes PPPS ()   6. Alcohol use  Initial Annual Wellness Visit - Includes PPPS ()   7. Hypothyroidism (acquired)     8. Obesity (BMI 30.0-34.9)  Patient identified as having weight management issue.  Appropriate orders and counseling given.   9. Screening for colorectal cancer  REFERRAL TO GI FOR COLONOSCOPY       Followup: Return in about 6 months (around 4/14/2022).      Signed by: Huy Meek M.D.

## 2021-10-15 NOTE — ASSESSMENT & PLAN NOTE
Patient is taking Synthroid 112 MCG's daily.  His last TSH on 10/6/2021 was 6.180.  Free T4 was 1.43.  We will continue with current regiment   Spoke with patient and informed provider reviewed records, can be seen as a consult with MFM but no need for LUIS EDUARDO.  Recommends patient see general OB service.  Also recommends FTS, reviewed with patient and declined. To continue care with Chiqui until LUIS EDUARDO to [facility] has been completed. Patient agreed.

## 2021-10-15 NOTE — ASSESSMENT & PLAN NOTE
She drinks 3-4 beers daily.  Sometimes he drinks close to bedtime.  He has been having issues with sleep.  He has come down from 5-6 beers previously.    Plan  Get a patient that recommended drinking as 1-2 drinks per day for male in no more than 14 drinks a week and advised that he is going to drink alcohol to have it earlier in the day and not close to bedtime to help with his sleep  Educated him on potential of liver disease and poor sleep related to alcohol use

## 2021-10-15 NOTE — ASSESSMENT & PLAN NOTE
Blood pressure elevated in the office today on 2 readings ranging 160s-170s systolic and 80s diastolic.  He has not been checking at home.  He is compliant with his losartan-hydrochlorothiazide combination.    Plan  Continue current medication and advised patient to keep a log of his blood pressure morning and evening for the next 2 weeks and then come back for separate nursing visit to reassess his blood pressure and also bring his pressure device for comparison to our in office device  Goal less than 130/80

## 2021-10-15 NOTE — ASSESSMENT & PLAN NOTE
Patient is taking daily aspirin, rosuvastatin, losartan-hydrochlorothiazide, and follows up with cardiology yearly.  He sees Dr. Erwin.    Plan  Advised patient to make his annual appointment with cardiology  Advised him to ask cardiologist if would be appropriate to start a beta-blocker given his coronary disease and elevated blood pressure  We will have him monitor his blood pressure closely at home and bring him back for a nursing blood pressure follow-up visit at which time we will need to adjust his medications as appropriate

## 2021-10-15 NOTE — ASSESSMENT & PLAN NOTE
Patient was diagnosed with NGHIA in the past and used CPAP device for short period of time.  However he recently had an incomplete home sleep study.  He is interested in repeating a sleep study and considering treatment as he is had daytime fatigue and elevated blood pressure.    Plan  We will refer patient for a lab sleep study

## 2021-11-02 ENCOUNTER — TELEPHONE (OUTPATIENT)
Dept: INTERNAL MEDICINE | Facility: OTHER | Age: 71
End: 2021-11-02

## 2021-11-02 ENCOUNTER — NON-PROVIDER VISIT (OUTPATIENT)
Dept: INTERNAL MEDICINE | Facility: OTHER | Age: 71
End: 2021-11-02
Payer: MEDICARE

## 2021-11-02 VITALS — DIASTOLIC BLOOD PRESSURE: 87 MMHG | SYSTOLIC BLOOD PRESSURE: 155 MMHG

## 2021-11-02 DIAGNOSIS — I10 ESSENTIAL HYPERTENSION: ICD-10-CM

## 2021-11-02 PROCEDURE — 99999 PR NO CHARGE: CPT | Performed by: INTERNAL MEDICINE

## 2021-11-02 NOTE — PROGRESS NOTES
Rodney Arias is a 71 y.o. male here for a non-provider visit for BP visit    Vitals:    11/02/21 0849 11/02/21 0851   BP: (!) 161/82 155/87   BP Location: Left arm Left arm   Patient Position: Sitting Sitting   BP Cuff Size: Large adult Large adult     If abnormal, was the Registered Nurse (office provider if RN is unavailable) notified today? Yes, Notified Dr. Long    Routed to PCP? Yes

## 2021-11-03 RX ORDER — CARVEDILOL 6.25 MG/1
6.25 TABLET ORAL 2 TIMES DAILY WITH MEALS
Qty: 180 TABLET | Refills: 0 | Status: SHIPPED | OUTPATIENT
Start: 2021-11-03 | End: 2022-02-14

## 2021-11-03 NOTE — TELEPHONE ENCOUNTER
Huy Meek M.D.  You 19 minutes ago (5:00 PM)   VG  What were his blood pressures running? Did he keep a home log too?     Message text

## 2021-11-04 NOTE — TELEPHONE ENCOUNTER
Went ahead and called patient. Let Alanna(wife) know about new medication and follow up if any side effects

## 2021-11-04 NOTE — TELEPHONE ENCOUNTER
Huy Meek M.D.  Nirmala Ornelas, Med Ass't  Caller: Unspecified (2 days ago, 11:08 AM)  Given his elevated blood pressure and history of coronary artery disease, I will start him on Coreg 6.125 mg twice daily.  Please advise patient to follow-up with us if any new side effects arise.  These can include dizziness or lightheadedness, lethargy, insomnia, hypertension, or sexual dysfunction.

## 2021-11-17 DIAGNOSIS — I10 ESSENTIAL HYPERTENSION: ICD-10-CM

## 2021-11-19 DIAGNOSIS — I10 ESSENTIAL HYPERTENSION: ICD-10-CM

## 2021-11-22 RX ORDER — LOSARTAN POTASSIUM AND HYDROCHLOROTHIAZIDE 25; 100 MG/1; MG/1
1 TABLET ORAL
Qty: 90 TABLET | Refills: 1 | OUTPATIENT
Start: 2021-11-22

## 2021-11-22 RX ORDER — LOSARTAN POTASSIUM AND HYDROCHLOROTHIAZIDE 25; 100 MG/1; MG/1
1 TABLET ORAL
Qty: 90 TABLET | Refills: 1 | Status: SHIPPED | OUTPATIENT
Start: 2021-11-22 | End: 2021-12-02 | Stop reason: SDUPTHER

## 2021-12-01 NOTE — TELEPHONE ENCOUNTER
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 female presenting for routine follow up visit of her limited stage small cell lung carcinoma.  She has a remote history of moderately differentiated squamous cell carcinoma of the right lower lobe of lung, status post right thoracotomy and lymph node sampling in 2019.  More recently, she was found to have an 11 mm lobulated right upper lobe nodule identified on CT scan of the chest.  PET/CT scan 2021 demonstrated abnormal hypermetabolism in the bilateral axilla and posterior right lung mass, as well as increased uptake in the hilar regions felt to likely be reactive.  FNA biopsy of the lung mass demonstrated small cell carcinoma.  Chest wall mass was biopsied and nondiagnostic.  Bronchoscopy on 3/17/2021 revealed metastatic small cell lung cancer in station 4R and 11 R lymph nodes.  Biopsies of station 11 L and 7 were negative for malignant cells.  The axillary adenopathy was felt to be inflammatory.  MRI of the brain was negative for metastatic disease.  She was started on definitive treatment with concurrent chemoradiotherapy.  Initially, the primary lung mass was treated to a dose of 16 Gy in 8 fractions.  Treatment was interrupted for a hospitalization x several weeks in May related to acute respiratory failure, followed by a prolonged rehabilitation stay.  Her last dose of chemotherapy was 2021.  Repeat PET/CT scan 2021 showed improvement in the treated region of the chest, with questionable activity in the left sternoclavicular joint and no evidence of  Pt came in today for his nurse visit. Bp was still on the higher end and let dr amado know. She asked for patient to come in sooner than his scheduled April appt but he said he could not make it in before that as he will be leaving out of state until winter is over and that is in April that he will be back in. He would like Dr. Meek to call him if possible with medication changes if needed   metastatic disease elsewhere.  She then returned for radiotherapy alone and was replanned.  She resumed treatment to the right lung mass to an additional 44 Gy in 22 fractions to complete a cumulative dose of 60 Gy.  Since treatment, she reports stable dyspnea and continues to remain on 3.5-4L supplemental oxygen continuously.  She reports chronic productive cough and takes Mucinex daily.  Occasional chest-wall pain and nausea, particularly with fits of coughing, are stable.  She denies hemoptysis, esophagitis, fever, chills, or weight loss.  Generalized pain in the setting of RA is stable.  She notes daily headaches which are aching and typically stacey with prn Tylenol.  She otherwise denies vision changes, focal weakness or paresthesias.  She is wheelchair dependent and has a lift chair in her living room.  She is independent with certain ADLs such as dressing and toileting, though requires assistance with cooking and bathing.  She denies further acute concerns today.  She returns today following restaging imaging studies with CT scan of the chest, MRI brain, and PET scan performed at an outside facility.      MEDICATIONS: Medication reconciliation for the patient was reviewed and confirmed in the electronic medical record.    Review of Systems   Constitutional: Positive for fatigue.   Respiratory: Positive for cough and shortness of breath.    Cardiovascular: Positive for chest pain and leg swelling.   Musculoskeletal: Positive for gait problem (wheelchair bound).   Neurological: Positive for dizziness, gait problem (wheelchair bound) and headaches.   Psychiatric/Behavioral: The patient is nervous/anxious.    All other systems reviewed and are negative.          KPS 70%      Physical Exam  Vitals and nursing note reviewed.   Constitutional:       General: She is not in acute distress.     Appearance: She is well-developed. She is obese.      Comments: Chronically ill-appearing female in no apparent distress  "sitting upright in wheelchair   HENT:      Head: Normocephalic and atraumatic.   Eyes:      Conjunctiva/sclera: Conjunctivae normal.      Pupils: Pupils are equal, round, and reactive to light.   Cardiovascular:      Rate and Rhythm: Normal rate and regular rhythm.      Heart sounds: No murmur heard.  No friction rub.   Pulmonary:      Effort: Pulmonary effort is normal.      Breath sounds: Normal breath sounds. No wheezing.      Comments: 4L O2 via NC.  Diminished lung sounds throughout.  Chest:      Chest wall: No tenderness.   Abdominal:      General: Bowel sounds are normal. There is no distension.      Palpations: Abdomen is soft. There is no mass.      Tenderness: There is no abdominal tenderness.   Musculoskeletal:         General: Normal range of motion.      Cervical back: Normal range of motion and neck supple.      Right lower leg: Edema (1+) present.      Left lower leg: Edema (1+) present.   Lymphadenopathy:      Cervical: No cervical adenopathy.   Skin:     General: Skin is warm and dry.   Neurological:      General: No focal deficit present.      Mental Status: She is alert and oriented to person, place, and time.      Cranial Nerves: No cranial nerve deficit.      Sensory: No sensory deficit.   Psychiatric:         Behavior: Behavior normal.         Thought Content: Thought content normal.         Judgment: Judgment normal.         VITAL SIGNS:   Vitals:    12/01/21 1110   BP: 163/66   Pulse: 88   Resp: 16   Temp: 97.3 °F (36.3 °C)   TempSrc: Infrared   SpO2: 96%  Comment: 4L   Weight: 90.3 kg (199 lb 1.6 oz)   Height: 144.8 cm (57\")   PainSc: 0-No pain     IMAGING:  I have personally reviewed the following imaging studies, which were performed at [facility] and have been uploaded into our PACS system.  These studies were reviewed alongside Dr. Kaur.  MRI brain 11/18/2021  CT chest, abdomen, pelvis 11/18/2021  PET/CT 11/29/2021      The following portions of the patient's history were " reviewed and updated as appropriate: allergies, current medications, past family history, past medical history, past social history, past surgical history and problem list.         Diagnoses and all orders for this visit:    1. Malignant neoplasm of bronchus and lung (HCC) (Primary)      IMPRESSION:  Fabiana Brody is a 61 y.o. female with recent diagnosis of T1bN1 stage IIB limited stage small cell carcinoma of the right upper lobe of lung.  She had difficulties and interruptions with her concurrent chemoradiation therapy course, and chemo was subsequently discontinued in May 2021.  She ultimately did complete a definitive dose of thoracic radiation despite a prolonged break in treatment requiring restaging/replanning.  No evidence of late toxicities, clinically she appears stable.  Repeat MRI brain 11/18/2021 is without evidence of metastatic intracranial disease.  Repeat CT scans of the chest, abdomen, and pelvis 11/18/2021 demonstrate no evidence of extrathoracic disease, though there appears to some interval development of two new nodules posteriorly on the right measuring 6 mm and 9 mm, potentially concerning for progression.  She underwent repeat PET/CT scan 11/29/2021 at [facility], which shows interval decrease in size and activity of the treated right apical nodule, consistent with treatment response.  There continues to be hypermetabolism along the upper chest wall bilaterally, improved from prior, that is potentially inflammatory.  Small nodules of the right lung posteriorly do not appear hypermetabolic, including the newly identified 9 mm right lower lobe nodule that has a maximum SUV of 1.7.  Findings were reviewed with the patient and her , with overall impression of no convincing evidence of progressive or new metastatic disease.  We discussed the role of PCI for patients with limited stage small cell lung cancer who have had good treatment response.  The patient remains a borderline  candidate as she has a very marginal performance status given the fact she is not readily mobile and not fully independent with ADLs.  We discussed potential pros/cons, risks/benefits of treatment.  Ultimately, the patient and her  are inclined to defer PCI at this time, as patient feels she may have some difficulty tolerating treatment and wants to remain well enough to enjoy the holidays.  Patient prefers observation with surveillance imaging of the brain, with preference to treat as needed down the road should she develop intracranial metastatic disease.  In the interim, she is scheduled return to Dr. Gary for ongoing oncologic management.      RECOMMENDATIONS:  Fabiana Brody continues oncologic care with Dr. Gary.  We will be available as needed, certainly should symptomology/imaging warrant.    Return if symptoms worsen or fail to improve, for Office Visit.    Thank you for allowing us to participate in the care of this pleasant patient.    JOELLE Parker

## 2021-12-02 ENCOUNTER — TELEPHONE (OUTPATIENT)
Dept: SCHEDULING | Facility: IMAGING CENTER | Age: 71
End: 2021-12-02

## 2021-12-02 DIAGNOSIS — I10 ESSENTIAL HYPERTENSION: ICD-10-CM

## 2021-12-02 RX ORDER — LOSARTAN POTASSIUM AND HYDROCHLOROTHIAZIDE 25; 100 MG/1; MG/1
1 TABLET ORAL
Qty: 90 TABLET | Refills: 1 | Status: SHIPPED | OUTPATIENT
Start: 2021-12-02 | End: 2021-12-06 | Stop reason: SDUPTHER

## 2021-12-02 NOTE — TELEPHONE ENCOUNTER
CW    Patient has been waiting on this prescriptionlosartan-hydrochlorothiazide (HYZAAR) 100-25 MG per tablet     They only have a couple of days left.  Patient would like this to be sent to Pharmacy:   [id number] - [place], [place] - [address]    Thank you,  Ana WALLACE

## 2021-12-02 NOTE — TELEPHONE ENCOUNTER
Called pt [phone]  No answer, left VM   Regarding refill sent to [facility] #[id number] per his request.

## 2021-12-06 DIAGNOSIS — I10 ESSENTIAL HYPERTENSION: ICD-10-CM

## 2021-12-06 NOTE — TELEPHONE ENCOUNTER
Pts wife Alanna called stating [facility] pharmacy on [address] told Pt they have not received the prescription of losartan-hydrochlorothiazide (HYZAAR) 100-25 MG per tablet and is asking for it to be sent again.    Thank you

## 2021-12-07 RX ORDER — LOSARTAN POTASSIUM AND HYDROCHLOROTHIAZIDE 25; 100 MG/1; MG/1
1 TABLET ORAL
Qty: 90 TABLET | Refills: 1 | Status: SHIPPED | OUTPATIENT
Start: 2021-12-07 | End: 2022-05-11 | Stop reason: SDUPTHER

## 2021-12-07 NOTE — TELEPHONE ENCOUNTER
"  Returned Alanna's call and reviewed findings.  She states she does not use the rx number as they are constantly traveling and speaks to pharmacy representative.      Called [facility] and reviewed findings.  S/w representative who  states current prescription is \"too soon for fill.  [employer] will not cover until 29.  90 day supply was processed 11/22\"  She states current rx is on hold at this time.  She states she will straighten out issue.      Called Alanna and reviewed findings.  She states she thinks she knows what the problem is as the prescription was sent to [place], [place].  She states she will follow up with pharmacy regarding issue.  She verbalizes understanding and states no other concerns or questions at this time and is appreciative of information given.  "

## 2022-01-03 NOTE — TELEPHONE ENCOUNTER
Last seen: 10/14/21 by Dr. Meek  Next appt: 04/28/22 with Dr. Meek    Was the patient seen in the last year in this department? Yes   Does patient have an active prescription for medications requested? No   Received Request Via: Pharmacy

## 2022-01-07 RX ORDER — OMEPRAZOLE 20 MG/1
20 CAPSULE, DELAYED RELEASE ORAL DAILY
Qty: 90 CAPSULE | Refills: 0 | Status: SHIPPED | OUTPATIENT
Start: 2022-01-07 | End: 2022-01-07 | Stop reason: DRUGHIGH

## 2022-01-07 RX ORDER — OMEPRAZOLE 40 MG/1
40 CAPSULE, DELAYED RELEASE ORAL DAILY
Qty: 90 CAPSULE | Refills: 0 | Status: SHIPPED | OUTPATIENT
Start: 2022-01-07 | End: 2022-01-13

## 2022-01-07 RX ORDER — LEVOTHYROXINE SODIUM 112 UG/1
TABLET ORAL
Qty: 90 TABLET | Refills: 0 | Status: SHIPPED | OUTPATIENT
Start: 2022-01-07 | End: 2022-04-12

## 2022-01-10 NOTE — TELEPHONE ENCOUNTER
Last seen: 10/14/2021 by Dr. Meek  Next appt: 04/28/2022 with Dr. Meek    Was the patient seen in the last year in this department? Yes   Does patient have an active prescription for medications requested? No   Received Request Via: Pharmacy

## 2022-01-13 RX ORDER — OMEPRAZOLE 40 MG/1
CAPSULE, DELAYED RELEASE ORAL
Qty: 90 CAPSULE | Refills: 0 | OUTPATIENT
Start: 2022-01-13

## 2022-01-13 RX ORDER — OMEPRAZOLE 40 MG/1
CAPSULE, DELAYED RELEASE ORAL
Qty: 90 CAPSULE | Refills: 0 | Status: SHIPPED | OUTPATIENT
Start: 2022-01-13 | End: 2022-07-12 | Stop reason: SDUPTHER

## 2022-01-13 NOTE — TELEPHONE ENCOUNTER
Omeprazole Refill    Last seen: 10/14/21 by Dr. Meek  Next appt: 4/28/22 with Dr. Meek    Was the patient seen in the last year in this department? Yes   Does patient have an active prescription for medications requested? No   Received Request Via: Pharmacy

## 2022-02-14 RX ORDER — CARVEDILOL 6.25 MG/1
TABLET ORAL
Qty: 180 TABLET | Refills: 0 | Status: SHIPPED | OUTPATIENT
Start: 2022-02-14 | End: 2022-04-12

## 2022-02-14 NOTE — TELEPHONE ENCOUNTER
Acute Care Note    Allison Pizano is 43 Pt called and he is out of the carvedilol and needs this refilled ASAP and this needs to go to [facility] in [facility]. Let her know I would give her a call back when its been refilled   all orders for this visit:    1. Chronic sinus complaints  -     REFERRAL TO ENT-OTOLARYNGOLOGY    2. Right ear pain         Advised the patient to call back or return to office if symptoms worsen/change/persist.   Discussed expected course/resolution/complications of diagnosis in detail with patient. Medication risks/benefits/costs/interactions/alternatives discussed with patient. The patient was given an after visit summary which includes diagnoses, current medications, & vitals. They expressed understanding with the diagnosis and plan.

## 2022-04-11 NOTE — TELEPHONE ENCOUNTER
Levothyroxine, Famotidine, Carvedilol Refill    Last seen: 10/14/21 by Dr. Meek  Next appt: 5/11/22 with Dr. Meek    Was the patient seen in the last year in this department? Yes   Does patient have an active prescription for medications requested? No   Received Request Via: Pharmacy

## 2022-04-12 RX ORDER — CARVEDILOL 6.25 MG/1
TABLET ORAL
Qty: 180 TABLET | Refills: 0 | Status: SHIPPED | OUTPATIENT
Start: 2022-04-12 | End: 2022-05-11

## 2022-04-12 RX ORDER — FAMOTIDINE 20 MG/1
TABLET, FILM COATED ORAL
Qty: 90 TABLET | Refills: 0 | Status: SHIPPED | OUTPATIENT
Start: 2022-04-12 | End: 2022-05-11

## 2022-04-12 RX ORDER — LEVOTHYROXINE SODIUM 112 UG/1
TABLET ORAL
Qty: 90 TABLET | Refills: 0 | Status: SHIPPED | OUTPATIENT
Start: 2022-04-12 | End: 2022-07-13

## 2022-05-03 ENCOUNTER — OFFICE VISIT (OUTPATIENT)
Dept: INTERNAL MEDICINE | Facility: OTHER | Age: 72
End: 2022-05-03
Payer: MEDICARE

## 2022-05-03 VITALS
OXYGEN SATURATION: 92 % | WEIGHT: 219.6 LBS | HEIGHT: 71 IN | DIASTOLIC BLOOD PRESSURE: 86 MMHG | TEMPERATURE: 98.2 F | HEART RATE: 75 BPM | SYSTOLIC BLOOD PRESSURE: 160 MMHG | BODY MASS INDEX: 30.74 KG/M2

## 2022-05-03 DIAGNOSIS — H81.13 BENIGN PAROXYSMAL VERTIGO OF BOTH EARS: ICD-10-CM

## 2022-05-03 PROCEDURE — 99213 OFFICE O/P EST LOW 20 MIN: CPT | Mod: GE | Performed by: STUDENT IN AN ORGANIZED HEALTH CARE EDUCATION/TRAINING PROGRAM

## 2022-05-03 RX ORDER — ROSUVASTATIN CALCIUM 20 MG/1
TABLET, COATED ORAL
COMMUNITY
Start: 2021-06-21 | End: 2022-05-03

## 2022-05-03 ASSESSMENT — ENCOUNTER SYMPTOMS
DOUBLE VISION: 0
FEVER: 0
SEIZURES: 0
VOMITING: 0
FALLS: 0
MYALGIAS: 0
CHILLS: 0
HEADACHES: 0
WHEEZING: 0
SHORTNESS OF BREATH: 0
ABDOMINAL PAIN: 0
CONSTIPATION: 0
SPEECH CHANGE: 0
DIZZINESS: 0
BLURRED VISION: 0
SORE THROAT: 0
DIARRHEA: 0
PALPITATIONS: 0
NAUSEA: 0
COUGH: 0
NERVOUS/ANXIOUS: 0

## 2022-05-03 ASSESSMENT — LIFESTYLE VARIABLES: SUBSTANCE_ABUSE: 0

## 2022-05-03 ASSESSMENT — FIBROSIS 4 INDEX: FIB4 SCORE: 1.3

## 2022-05-03 ASSESSMENT — PATIENT HEALTH QUESTIONNAIRE - PHQ9: CLINICAL INTERPRETATION OF PHQ2 SCORE: 0

## 2022-05-03 NOTE — ASSESSMENT & PLAN NOTE
No acute issues. Neurological exam normal. Filled out DMV paperwork for no restrictions on driving. Scanned into chart.

## 2022-05-03 NOTE — PATIENT INSTRUCTIONS
Hello! Today we saw you for:    -DMV paperwork    Please follow up with your appointment with Dr. Meek.    Thank you!

## 2022-05-03 NOTE — PROGRESS NOTES
Established Patient    Rodney Arias is a 71 y.o. male who presents today with the following:    CC:   Chief Complaint   Patient presents with   • Paperwork       HPI:     Patient is a pleasant 70 y/o M with a PMH significant for CAD (s/p MI and stent), HTN/DLD, GERD, NGHIA, and hypothyroidism.    Patient is here to fill out paperwork for the DMV. It is because he has had a history of BPPV in the past.    Patient had vertigo as an  (e.g. with heights) but not any more since he retired (has been retired for 8 years). He has no changes in vision or hearing. He used to need glasses but doesn't need them anymore - states his vision is getting better recently. He doesn't have any shortness of breath or chest pain. He is able to perform all of his ADLs/IADLs by self.    Of note: He states he drinks a few drinks per day but never does when he drives.    Review of Systems   Constitutional: Negative for chills, fever and malaise/fatigue.   HENT: Negative for congestion and sore throat.    Eyes: Negative for blurred vision and double vision.   Respiratory: Negative for cough, shortness of breath and wheezing.    Cardiovascular: Negative for chest pain, palpitations and leg swelling.   Gastrointestinal: Negative for abdominal pain, constipation, diarrhea, nausea and vomiting.   Genitourinary: Negative for dysuria, frequency and urgency.   Musculoskeletal: Negative for falls and myalgias.   Neurological: Negative for dizziness, speech change, seizures and headaches.   Psychiatric/Behavioral: Negative for substance abuse and suicidal ideas. The patient is not nervous/anxious.        Patient Active Problem List    Diagnosis Date Noted   • Alcohol use 10/14/2021   • Hypothyroidism (acquired) 10/14/2021   • Obstructive sleep apnea 05/15/2020   • GERD (gastroesophageal reflux disease) 03/28/2013   • Benign paroxysmal vertigo of both ears 03/28/2013   • Coronary artery disease due to lipid rich plaque 10/21/2011  "  • MI (myocardial infarction) (HCC) 10/21/2011   • Stented coronary artery 10/21/2011   • Essential hypertension 10/21/2011   • Dyslipidemia 10/21/2011       Social History     Tobacco Use   • Smoking status: Never Smoker   • Smokeless tobacco: Never Used   Vaping Use   • Vaping Use: Never used   Substance Use Topics   • Alcohol use: Yes     Alcohol/week: 1.2 - 1.8 oz     Types: 2 - 3 Cans of beer per week     Comment: beer   • Drug use: No       Current Outpatient Medications   Medication Sig Dispense Refill   • levothyroxine (SYNTHROID) 112 MCG Tab TAKE 1 TABLET BY MOUTH EVERY MORNING ON AN EMPTY STOMACH 90 Tablet 0   • famotidine (PEPCID) 20 MG Tab TAKE 1 TABLET BY MOUTH DAILY 90 Tablet 0   • carvedilol (COREG) 6.25 MG Tab TAKE 1 TABLET BY MOUTH TWICE DAILY WITH MEALS 180 Tablet 0   • omeprazole (PRILOSEC) 40 MG delayed-release capsule TAKE 1 CAPSULE BY MOUTH EVERY DAY 90 Capsule 0   • losartan-hydrochlorothiazide (HYZAAR) 100-25 MG per tablet Take 1 Tablet by mouth every day. 90 Tablet 1   • rosuvastatin (CRESTOR) 20 MG Tab Take 1 Tablet by mouth every evening. 90 Tablet 3   • b complex vitamins tablet B COMPLEX TABS     • aspirin (ASA) 81 MG Chew Tab chewable tablet 81 mg.     • Potassium 99 MG TABS Take 1 Tab by mouth every morning.       No current facility-administered medications for this visit.       /86 (BP Location: Left arm, Patient Position: Sitting, BP Cuff Size: Adult)   Pulse 75   Temp 36.8 °C (98.2 °F) (Temporal)   Ht 1.803 m (5' 11\")   Wt 99.6 kg (219 lb 9.6 oz)   SpO2 92%   BMI 30.63 kg/m²     PHYSICAL EXAM:  Physical Exam  Vitals and nursing note reviewed.   Constitutional:       General: He is not in acute distress.     Appearance: He is obese. He is not ill-appearing.   HENT:      Head: Normocephalic and atraumatic.      Comments: Mild age-related hearing loss. Slightly hard of hearing     Right Ear: External ear normal.      Left Ear: External ear normal.      Nose: No congestion " or rhinorrhea.      Mouth/Throat:      Mouth: Mucous membranes are moist.      Pharynx: Oropharynx is clear.   Eyes:      General: No scleral icterus.        Right eye: No discharge.         Left eye: No discharge.      Extraocular Movements: Extraocular movements intact.      Conjunctiva/sclera: Conjunctivae normal.      Pupils: Pupils are equal, round, and reactive to light.   Cardiovascular:      Rate and Rhythm: Normal rate and regular rhythm.      Pulses: Normal pulses.      Heart sounds: Normal heart sounds.   Pulmonary:      Effort: Pulmonary effort is normal. No respiratory distress.      Breath sounds: Normal breath sounds. No stridor. No wheezing or rales.   Abdominal:      General: Bowel sounds are normal. There is no distension.      Palpations: Abdomen is soft.      Tenderness: There is no abdominal tenderness. There is no right CVA tenderness, left CVA tenderness or guarding.   Musculoskeletal:         General: No swelling or tenderness. Normal range of motion.      Cervical back: Normal range of motion and neck supple. No rigidity.      Right lower leg: No edema.      Left lower leg: No edema.   Skin:     General: Skin is warm and dry.      Capillary Refill: Capillary refill takes less than 2 seconds.      Coloration: Skin is not jaundiced.   Neurological:      General: No focal deficit present.      Mental Status: He is alert and oriented to person, place, and time. Mental status is at baseline.      GCS: GCS eye subscore is 4. GCS verbal subscore is 5. GCS motor subscore is 6.      Cranial Nerves: No cranial nerve deficit, dysarthria or facial asymmetry.      Sensory: No sensory deficit.      Motor: No weakness, tremor, atrophy, abnormal muscle tone, seizure activity or pronator drift.      Coordination: Romberg sign negative. Coordination normal.      Deep Tendon Reflexes:      Reflex Scores:       Patellar reflexes are 2+ on the right side and 2+ on the left side.  Psychiatric:         Mood and  Affect: Mood normal.         Behavior: Behavior normal.         Thought Content: Thought content normal.         Judgment: Judgment normal.           Assessment and Plan  Benign paroxysmal vertigo of both ears  No acute issues. Neurological exam normal. Filled out paperwork for no restrictions on driving.    Follow up with Appt on 5/11/2022 with Dr. Meek.    Signed by: Alee Arias M.D.

## 2022-05-04 ENCOUNTER — HOSPITAL ENCOUNTER (OUTPATIENT)
Dept: LAB | Facility: MEDICAL CENTER | Age: 72
End: 2022-05-04
Attending: STUDENT IN AN ORGANIZED HEALTH CARE EDUCATION/TRAINING PROGRAM
Payer: MEDICARE

## 2022-05-04 DIAGNOSIS — I10 ESSENTIAL HYPERTENSION: ICD-10-CM

## 2022-05-04 LAB
ALBUMIN SERPL BCP-MCNC: 5 G/DL (ref 3.2–4.9)
ALBUMIN/GLOB SERPL: 1.8 G/DL
ALP SERPL-CCNC: 67 U/L (ref 30–99)
ALT SERPL-CCNC: 17 U/L (ref 2–50)
ANION GAP SERPL CALC-SCNC: 13 MMOL/L (ref 7–16)
AST SERPL-CCNC: 13 U/L (ref 12–45)
BASOPHILS # BLD AUTO: 1.9 % (ref 0–1.8)
BASOPHILS # BLD: 0.11 K/UL (ref 0–0.12)
BILIRUB SERPL-MCNC: 0.8 MG/DL (ref 0.1–1.5)
BUN SERPL-MCNC: 15 MG/DL (ref 8–22)
CALCIUM SERPL-MCNC: 9.6 MG/DL (ref 8.5–10.5)
CHLORIDE SERPL-SCNC: 91 MMOL/L (ref 96–112)
CO2 SERPL-SCNC: 26 MMOL/L (ref 20–33)
CREAT SERPL-MCNC: 0.91 MG/DL (ref 0.5–1.4)
EOSINOPHIL # BLD AUTO: 0.27 K/UL (ref 0–0.51)
EOSINOPHIL NFR BLD: 4.6 % (ref 0–6.9)
ERYTHROCYTE [DISTWIDTH] IN BLOOD BY AUTOMATED COUNT: 40.4 FL (ref 35.9–50)
GFR SERPLBLD CREATININE-BSD FMLA CKD-EPI: 88 ML/MIN/1.73 M 2
GLOBULIN SER CALC-MCNC: 2.8 G/DL (ref 1.9–3.5)
GLUCOSE SERPL-MCNC: 106 MG/DL (ref 65–99)
HCT VFR BLD AUTO: 46 % (ref 42–52)
HGB BLD-MCNC: 15.8 G/DL (ref 14–18)
IMM GRANULOCYTES # BLD AUTO: 0.02 K/UL (ref 0–0.11)
IMM GRANULOCYTES NFR BLD AUTO: 0.3 % (ref 0–0.9)
LYMPHOCYTES # BLD AUTO: 1.86 K/UL (ref 1–4.8)
LYMPHOCYTES NFR BLD: 32 % (ref 22–41)
MCH RBC QN AUTO: 30.3 PG (ref 27–33)
MCHC RBC AUTO-ENTMCNC: 34.3 G/DL (ref 33.7–35.3)
MCV RBC AUTO: 88.1 FL (ref 81.4–97.8)
MONOCYTES # BLD AUTO: 0.66 K/UL (ref 0–0.85)
MONOCYTES NFR BLD AUTO: 11.4 % (ref 0–13.4)
NEUTROPHILS # BLD AUTO: 2.89 K/UL (ref 1.82–7.42)
NEUTROPHILS NFR BLD: 49.8 % (ref 44–72)
NRBC # BLD AUTO: 0 K/UL
NRBC BLD-RTO: 0 /100 WBC
PLATELET # BLD AUTO: 230 K/UL (ref 164–446)
PMV BLD AUTO: 8.9 FL (ref 9–12.9)
POTASSIUM SERPL-SCNC: 4 MMOL/L (ref 3.6–5.5)
PROT SERPL-MCNC: 7.8 G/DL (ref 6–8.2)
RBC # BLD AUTO: 5.22 M/UL (ref 4.7–6.1)
SODIUM SERPL-SCNC: 130 MMOL/L (ref 135–145)
WBC # BLD AUTO: 5.8 K/UL (ref 4.8–10.8)

## 2022-05-04 PROCEDURE — 80053 COMPREHEN METABOLIC PANEL: CPT

## 2022-05-04 PROCEDURE — 36415 COLL VENOUS BLD VENIPUNCTURE: CPT

## 2022-05-04 PROCEDURE — 85025 COMPLETE CBC W/AUTO DIFF WBC: CPT

## 2022-05-05 ENCOUNTER — OFFICE VISIT (OUTPATIENT)
Dept: CARDIOLOGY | Facility: MEDICAL CENTER | Age: 72
End: 2022-05-05
Payer: MEDICARE

## 2022-05-05 VITALS
SYSTOLIC BLOOD PRESSURE: 110 MMHG | OXYGEN SATURATION: 95 % | HEIGHT: 71 IN | RESPIRATION RATE: 18 BRPM | HEART RATE: 68 BPM | BODY MASS INDEX: 31.5 KG/M2 | WEIGHT: 225 LBS | DIASTOLIC BLOOD PRESSURE: 64 MMHG

## 2022-05-05 DIAGNOSIS — I21.02 MYOCARDIAL INFARCTION INVOLVING LEFT ANTERIOR DESCENDING (LAD) CORONARY ARTERY, UNSPECIFIED MI TYPE (HCC): ICD-10-CM

## 2022-05-05 DIAGNOSIS — Z95.5 STENTED CORONARY ARTERY: ICD-10-CM

## 2022-05-05 DIAGNOSIS — E78.5 DYSLIPIDEMIA: ICD-10-CM

## 2022-05-05 DIAGNOSIS — I10 ESSENTIAL HYPERTENSION: ICD-10-CM

## 2022-05-05 DIAGNOSIS — I25.10 CORONARY ARTERY DISEASE DUE TO LIPID RICH PLAQUE: ICD-10-CM

## 2022-05-05 DIAGNOSIS — I25.83 CORONARY ARTERY DISEASE DUE TO LIPID RICH PLAQUE: ICD-10-CM

## 2022-05-05 PROCEDURE — 99214 OFFICE O/P EST MOD 30 MIN: CPT | Performed by: INTERNAL MEDICINE

## 2022-05-05 ASSESSMENT — FIBROSIS 4 INDEX: FIB4 SCORE: 1

## 2022-05-05 NOTE — PROGRESS NOTES
Chief Complaint   Patient presents with   • Coronary Artery Disease     F/V Dx: Coronary artery disease due to lipid rich plaque       Subjective     Rodney Arias is a 71 y.o. male who presents today for follow-up of his history of coronary status post stenting remotely    Nannette had a stress test done at [facility] which was normal    Past Medical History:   Diagnosis Date   • CAD (coronary artery disease) 10/21/2011   • Dyslipidemia 10/21/2011   • HTN (hypertension) 10/21/2011   • MI (myocardial infarction) (HCC) 10/21/2011   • Myocardial infarction (HCC)     with stent placement   • Stented coronary artery 10/21/2011     Past Surgical History:   Procedure Laterality Date   • ANGIOPLASTY     • ZZZ CARDIAC CATH       Family History   Problem Relation Age of Onset   • Heart Disease Neg Hx         no premature CAD     Social History     Socioeconomic History   • Marital status:      Spouse name: Not on file   • Number of children: Not on file   • Years of education: Not on file   • Highest education level: Not on file   Occupational History   • Not on file   Tobacco Use   • Smoking status: Never Smoker   • Smokeless tobacco: Never Used   Vaping Use   • Vaping Use: Never used   Substance and Sexual Activity   • Alcohol use: Yes     Alcohol/week: 12.6 oz     Types: 21 Cans of beer per week     Comment: 3 beers a day   • Drug use: No   • Sexual activity: Not on file   Other Topics Concern   • Not on file   Social History Narrative   • Not on file     Social Determinants of Health     Financial Resource Strain: Not on file   Food Insecurity: Not on file   Transportation Needs: Not on file   Physical Activity: Not on file   Stress: Not on file   Social Connections: Not on file   Intimate Partner Violence: Not on file   Housing Stability: Not on file     Allergies   Allergen Reactions   • Nkda [No Known Drug Allergy]      Outpatient Encounter Medications as of 5/5/2022   Medication Sig Dispense Refill   •  "levothyroxine (SYNTHROID) 112 MCG Tab TAKE 1 TABLET BY MOUTH EVERY MORNING ON AN EMPTY STOMACH 90 Tablet 0   • carvedilol (COREG) 6.25 MG Tab TAKE 1 TABLET BY MOUTH TWICE DAILY WITH MEALS 180 Tablet 0   • omeprazole (PRILOSEC) 40 MG delayed-release capsule TAKE 1 CAPSULE BY MOUTH EVERY DAY 90 Capsule 0   • losartan-hydrochlorothiazide (HYZAAR) 100-25 MG per tablet Take 1 Tablet by mouth every day. 90 Tablet 1   • rosuvastatin (CRESTOR) 20 MG Tab Take 1 Tablet by mouth every evening. 90 Tablet 3   • b complex vitamins tablet B COMPLEX TABS     • aspirin (ASA) 81 MG Chew Tab chewable tablet 81 mg.     • Potassium 99 MG TABS Take 1 Tab by mouth every morning.     • famotidine (PEPCID) 20 MG Tab TAKE 1 TABLET BY MOUTH DAILY (Patient not taking: Reported on 5/5/2022) 90 Tablet 0     No facility-administered encounter medications on file as of 5/5/2022.     ROS           Objective     /64 (BP Location: Left arm, Patient Position: Sitting, BP Cuff Size: Adult)   Pulse 68   Resp 18   Ht 1.803 m (5' 11\")   Wt 102 kg (225 lb)   SpO2 95%   BMI 31.38 kg/m²     Physical Exam  Constitutional:       General: He is not in acute distress.     Appearance: He is not diaphoretic.   Eyes:      General: No scleral icterus.  Neck:      Vascular: No JVD.   Cardiovascular:      Rate and Rhythm: Normal rate.      Heart sounds: Normal heart sounds. No murmur heard.    No friction rub. No gallop.   Pulmonary:      Effort: No respiratory distress.      Breath sounds: No wheezing or rales.   Abdominal:      General: Bowel sounds are normal.      Palpations: Abdomen is soft.   Skin:     Findings: No rash.   Neurological:      Mental Status: He is alert.             We reviewed in person the most recent labs  Recent Results (from the past 5040 hour(s))   Comp Metabolic Panel    Collection Time: 05/04/22  9:50 AM   Result Value Ref Range    Sodium 130 (L) 135 - 145 mmol/L    Potassium 4.0 3.6 - 5.5 mmol/L    Chloride 91 (L) 96 - 112 " mmol/L    Co2 26 20 - 33 mmol/L    Anion Gap 13.0 7.0 - 16.0    Glucose 106 (H) 65 - 99 mg/dL    Bun 15 8 - 22 mg/dL    Creatinine 0.91 0.50 - 1.40 mg/dL    Calcium 9.6 8.5 - 10.5 mg/dL    AST(SGOT) 13 12 - 45 U/L    ALT(SGPT) 17 2 - 50 U/L    Alkaline Phosphatase 67 30 - 99 U/L    Total Bilirubin 0.8 0.1 - 1.5 mg/dL    Albumin 5.0 (H) 3.2 - 4.9 g/dL    Total Protein 7.8 6.0 - 8.2 g/dL    Globulin 2.8 1.9 - 3.5 g/dL    A-G Ratio 1.8 g/dL   CBC WITH DIFFERENTIAL    Collection Time: 05/04/22  9:50 AM   Result Value Ref Range    WBC 5.8 4.8 - 10.8 K/uL    RBC 5.22 4.70 - 6.10 M/uL    Hemoglobin 15.8 14.0 - 18.0 g/dL    Hematocrit 46.0 42.0 - 52.0 %    MCV 88.1 81.4 - 97.8 fL    MCH 30.3 27.0 - 33.0 pg    MCHC 34.3 33.7 - 35.3 g/dL    RDW 40.4 35.9 - 50.0 fL    Platelet Count 230 164 - 446 K/uL    MPV 8.9 (L) 9.0 - 12.9 fL    Neutrophils-Polys 49.80 44.00 - 72.00 %    Lymphocytes 32.00 22.00 - 41.00 %    Monocytes 11.40 0.00 - 13.40 %    Eosinophils 4.60 0.00 - 6.90 %    Basophils 1.90 (H) 0.00 - 1.80 %    Immature Granulocytes 0.30 0.00 - 0.90 %    Nucleated RBC 0.00 /100 WBC    Neutrophils (Absolute) 2.89 1.82 - 7.42 K/uL    Lymphs (Absolute) 1.86 1.00 - 4.80 K/uL    Monos (Absolute) 0.66 0.00 - 0.85 K/uL    Eos (Absolute) 0.27 0.00 - 0.51 K/uL    Baso (Absolute) 0.11 0.00 - 0.12 K/uL    Immature Granulocytes (abs) 0.02 0.00 - 0.11 K/uL    NRBC (Absolute) 0.00 K/uL   ESTIMATED GFR    Collection Time: 05/04/22  9:50 AM   Result Value Ref Range    GFR (CKD-EPI) 88 >60 mL/min/1.73 m 2           Assessment & Plan     1. Coronary artery disease due to lipid rich plaque     2. Dyslipidemia     3. Essential hypertension     4. Myocardial infarction involving left anterior descending (LAD) coronary artery, unspecified MI type (HCC)     5. Stented coronary artery         Medical Decision Making: Today's Assessment/Status/Plan:        It was my pleasure to meet with Mr. Arias.    We addressed the management of hypertension at  today's visit. Blood pressure is well controlled.  We specifically assessed the labs on hypertension treatment    We addressed the management of dyslipidemia at today's visit. He is on appropriate statin.      We addressed the management of coronary artery disease.  He is on proper antiplatelet, cholesterol management and beta-blockers as appropriate.  We addressed the potential side effects and laboratory follow-up for these medications.      I will see Mr. Arias back in 1 year time and encouraged him to follow up with us over the phone or electronically using my MyChart as issues arise.    It is my pleasure to participate in the care of Mr. Arias.  Please do not hesitate to contact me with questions or concerns.    Demian Erwin MD PhD [facility]  Cardiologist [facility]    Please note that this dictation was created using voice recognition software. There may be errors I did not discover before finalizing the note.

## 2022-05-05 NOTE — PATIENT INSTRUCTIONS
We reviewed in person the most recent labs  Recent Results (from the past 5040 hour(s))   Comp Metabolic Panel    Collection Time: 05/04/22  9:50 AM   Result Value Ref Range    Sodium 130 (L) 135 - 145 mmol/L    Potassium 4.0 3.6 - 5.5 mmol/L    Chloride 91 (L) 96 - 112 mmol/L    Co2 26 20 - 33 mmol/L    Anion Gap 13.0 7.0 - 16.0    Glucose 106 (H) 65 - 99 mg/dL    Bun 15 8 - 22 mg/dL    Creatinine 0.91 0.50 - 1.40 mg/dL    Calcium 9.6 8.5 - 10.5 mg/dL    AST(SGOT) 13 12 - 45 U/L    ALT(SGPT) 17 2 - 50 U/L    Alkaline Phosphatase 67 30 - 99 U/L    Total Bilirubin 0.8 0.1 - 1.5 mg/dL    Albumin 5.0 (H) 3.2 - 4.9 g/dL    Total Protein 7.8 6.0 - 8.2 g/dL    Globulin 2.8 1.9 - 3.5 g/dL    A-G Ratio 1.8 g/dL   CBC WITH DIFFERENTIAL    Collection Time: 05/04/22  9:50 AM   Result Value Ref Range    WBC 5.8 4.8 - 10.8 K/uL    RBC 5.22 4.70 - 6.10 M/uL    Hemoglobin 15.8 14.0 - 18.0 g/dL    Hematocrit 46.0 42.0 - 52.0 %    MCV 88.1 81.4 - 97.8 fL    MCH 30.3 27.0 - 33.0 pg    MCHC 34.3 33.7 - 35.3 g/dL    RDW 40.4 35.9 - 50.0 fL    Platelet Count 230 164 - 446 K/uL    MPV 8.9 (L) 9.0 - 12.9 fL    Neutrophils-Polys 49.80 44.00 - 72.00 %    Lymphocytes 32.00 22.00 - 41.00 %    Monocytes 11.40 0.00 - 13.40 %    Eosinophils 4.60 0.00 - 6.90 %    Basophils 1.90 (H) 0.00 - 1.80 %    Immature Granulocytes 0.30 0.00 - 0.90 %    Nucleated RBC 0.00 /100 WBC    Neutrophils (Absolute) 2.89 1.82 - 7.42 K/uL    Lymphs (Absolute) 1.86 1.00 - 4.80 K/uL    Monos (Absolute) 0.66 0.00 - 0.85 K/uL    Eos (Absolute) 0.27 0.00 - 0.51 K/uL    Baso (Absolute) 0.11 0.00 - 0.12 K/uL    Immature Granulocytes (abs) 0.02 0.00 - 0.11 K/uL    NRBC (Absolute) 0.00 K/uL   ESTIMATED GFR    Collection Time: 05/04/22  9:50 AM   Result Value Ref Range    GFR (CKD-EPI) 88 >60 mL/min/1.73 m 2       A - Antiplatelet - Aspirin 81 mg daily or other antiplatelets (clopidogrel (PLAVIX), prasrugrel (EFFIENT), ticagrelor (BRILINTA)) reduce your risk.  B - Blood Pressure  Control - reduces your risk or heart attack and stroke.  C - Cholesterol Management - statins reduce your risk; for those that are intolerant to statins, there are alternatives.  D - Diet - Cardiac rehab diets, [email or web address].org.  E - Exercise - at least 5 hours of moderate exercise weekly  (typical brisk walking or similar activity).  F - Fats - VASCEPA,  or Fish oil (if Vascepa too expensive) for elevated triglycerides (REDUCE IT trial showed reduction from 22% 5 year MACE to 17%).  G - Good Vibes - meditation, exercise, yoga, mindfulness, stress reduction.  H - Heart Failure - betablockers, sucubatril (ENTRESTO) 16% less risk of dying over 3 years, spironolactone, dapagliflozin (FARXIGA) 17% less risk of dying over 2 years, CRT +/- ICD.  I - Inflammation - Colchicine in the LoDoCo2 study in 2020 reduced the risk of heart attack by 30% in 2.5 year follow up.  R - Rehab - Cardiac rehab reduces risk of dying by 13-24% and need to go to the hospital by 30% within the first year.  S - Smoking - never smoke, if you do smoke ask for help to quit for good.  T - Type II Diabetes - pills empagliflozin (JARDIANCE) 38% less risk of dying over 4 years, and/or weekly injections liraglutide (Victoza), semaglutide (Ozempic), dulaglutide (Trulicity) ~26% less risk of MACE in 2 years.  V - Vaccines - Annual flu shot and COVID vaccine reduces the risk of serious cardiovascular complications from these deadly infections.  W - Weight - maintain a healthy weight.      Work on at least 1.5 - 5 hours a week of moderate exercise    Please look into the following diets and incorporate them into your diet  LOW SALT DIET   KEEP YOUR SODIUM EQUAL TO CALORIES AND NO MORE THAN DOUBLE THE CALORIES FOR A LOW SALT DIET    Cardiosmart.org - great resource for American College of Cardiology on heart disease prevention and treatment    FOR TREATMENT OR PREVENTION OF CORONARY ARTERY DISEASE  These three programs are approved by Medicare/Insurers for  those with heart disease  [facility] - [facility]  Dr. Renee[facility] for Reversing Heart Disease - Morgan Umana's Cardiologist vegetarian-based  [facility] - [place]-based mind-body Program    This is a commonly referenced Program  Dr Izabela Disla over Vincent (book and documentary) - vegetarian-based    FOR TREATMENT OF BLOOD PRESSURE  DASH DIET - American Heart Association for treatment of HYPERTENSION    FOR TREATMENT OF BAD CHOLESTEROL/FATS  REDUCE PROCESSED SUGAR AS MUCH AS POSSIBLE  INCREASE WHOLE GRAINS/VEGETABLES  INCREASE FIBER    Lowering total cholesterol and LDL (bad) cholesterol:  - Eat leaner cuts of meat, or eliminate altogether if possible red meat, and frequently substitute fish or chicken.  - Limit saturated fat to no more than 7-10% of total calories no more than 10 g per day is recommended. Some sources of saturated fat include butter, animal fats, hydrogenated vegetable fats and oils, many desserts, whole milk dairy products.  - Replaced saturated fats with polyunsaturated fats and monounsaturated fats. Foods high in monounsaturated fat include nuts, canola oil, avocados, and olives.  - Limit trans fat (processed foods) and replaced with fresh fruits and vegetables  - Recommend nonfat dairy products  - Increase substantially the amount of soluble fiber intake (legumes such as beans, fruit, whole grains).  - Consider nutritional supplements: plant sterile spreads such as Benecol, fish oil,  flaxseed oil, omega-3 acids capsules 1000 mg twice a day, or viscous fiber such as Metamucil  - Attain ideal weight and regular exercise (at least 30 minutes per day of moderate exercise)  ASK ABOUT STATIN OR NON STATIN MEDICATION TO REDUCE YOUR LDL AND HEART RISK    Lowering triglycerides:  - Reduce intake of simple sugar: Desserts, candy, pastries, honey, sodas, sugared cereals, yogurt, Gatorade, sports bars, canned fruit, smoothies, fruit juice,  coffee drinks  - Reduced intake of refined starches: Refined Pasta, most bread  - Reduce or abstain from alcohol  - Increase omega-3 fatty acids: [place], Trout, Mackerel, [place]ring, [place] tuna and supplements  - Attain ideal weight and regular exercise (at least 30 minutes per day of moderate exercise)  ASK ABOUT PURIFIED OMEGA 3 EPA or FISH OIL TO REDUCE YOUR TG AND HEART RISK    Elevating HDL (good) cholesterol:  - Increase physical activity  - Increase omega-3 fatty acids and supplements as listed above  - Incorporating appropriate amounts of monounsaturated fats such as nuts, olive oil, canola oil, avocados, olives  - Stop smoking  - Attain ideal weight and regular exercise (at least 30 minutes per day of moderate exercise)

## 2022-05-11 ENCOUNTER — OFFICE VISIT (OUTPATIENT)
Dept: INTERNAL MEDICINE | Facility: OTHER | Age: 72
End: 2022-05-11
Payer: MEDICARE

## 2022-05-11 VITALS
SYSTOLIC BLOOD PRESSURE: 146 MMHG | HEIGHT: 71 IN | TEMPERATURE: 97.9 F | WEIGHT: 224.4 LBS | BODY MASS INDEX: 31.42 KG/M2 | HEART RATE: 78 BPM | DIASTOLIC BLOOD PRESSURE: 87 MMHG | OXYGEN SATURATION: 94 %

## 2022-05-11 DIAGNOSIS — R13.12 OROPHARYNGEAL DYSPHAGIA: ICD-10-CM

## 2022-05-11 DIAGNOSIS — K21.9 GASTROESOPHAGEAL REFLUX DISEASE WITHOUT ESOPHAGITIS: ICD-10-CM

## 2022-05-11 DIAGNOSIS — H61.23 BILATERAL IMPACTED CERUMEN: ICD-10-CM

## 2022-05-11 DIAGNOSIS — E78.5 DYSLIPIDEMIA: ICD-10-CM

## 2022-05-11 DIAGNOSIS — E87.1 HYPONATREMIA: ICD-10-CM

## 2022-05-11 DIAGNOSIS — Z78.9 ALCOHOL USE: ICD-10-CM

## 2022-05-11 DIAGNOSIS — E03.9 HYPOTHYROIDISM (ACQUIRED): ICD-10-CM

## 2022-05-11 DIAGNOSIS — G47.33 OBSTRUCTIVE SLEEP APNEA: ICD-10-CM

## 2022-05-11 DIAGNOSIS — I10 ESSENTIAL HYPERTENSION: ICD-10-CM

## 2022-05-11 DIAGNOSIS — H91.93 BILATERAL HEARING LOSS, UNSPECIFIED HEARING LOSS TYPE: ICD-10-CM

## 2022-05-11 DIAGNOSIS — Z95.5 STENTED CORONARY ARTERY: ICD-10-CM

## 2022-05-11 PROCEDURE — 99214 OFFICE O/P EST MOD 30 MIN: CPT | Mod: GC | Performed by: STUDENT IN AN ORGANIZED HEALTH CARE EDUCATION/TRAINING PROGRAM

## 2022-05-11 RX ORDER — CARVEDILOL 12.5 MG/1
12.5 TABLET ORAL 2 TIMES DAILY WITH MEALS
Qty: 180 TABLET | Refills: 1 | Status: SHIPPED | OUTPATIENT
Start: 2022-05-11 | End: 2022-05-25 | Stop reason: SDUPTHER

## 2022-05-11 RX ORDER — LOSARTAN POTASSIUM AND HYDROCHLOROTHIAZIDE 25; 100 MG/1; MG/1
1 TABLET ORAL
Qty: 90 TABLET | Refills: 3 | Status: SHIPPED | OUTPATIENT
Start: 2022-05-11 | End: 2022-07-13

## 2022-05-11 ASSESSMENT — FIBROSIS 4 INDEX: FIB4 SCORE: 1.01

## 2022-05-11 NOTE — PATIENT INSTRUCTIONS
Speech pathology for choking evaluation   Will clean ears today, consider audiology referral for hearing loss for hearing aids or follow up with [facility]   For the blood pressure, check at home if >130/80 then will go up on the coreg   Will check thyroid labs and bmp   Will send you again for sleep study

## 2022-05-12 NOTE — ASSESSMENT & PLAN NOTE
Recently saw cardiology, stable not complaining of chest pain, dyspnea, palpitations, or edema.  Has good functional status.  Continue aspirin, statin, beta-blocker, and blood pressure regiment with goal less than 130/80.

## 2022-05-12 NOTE — ASSESSMENT & PLAN NOTE
Goal less than 130/80, patient not at goal.  We will increase his Coreg to 12.5 mg twice daily and continue with Hyzaar 100-25

## 2022-05-12 NOTE — ASSESSMENT & PLAN NOTE
Patient had a couple incomplete home sleep evaluations.  He is interested in repeating sleep eval.  Home sleep assessment ordered.    Stop bang score of 4, high risk for NGHIA

## 2022-05-12 NOTE — ASSESSMENT & PLAN NOTE
Patient and wife report that patient often chokes with eating.  Placed a speech pathology evaluation referral.

## 2022-05-12 NOTE — ASSESSMENT & PLAN NOTE
130 on last BMP, asymptomatic.  May be related to chronic beer and hydrochlorothiazide use.  Advised him to cut down his beer drinking and will repeat BMP.  If remains decreased or worse, will likely substitute hydrochlorothiazide for a different agent.

## 2022-05-12 NOTE — ASSESSMENT & PLAN NOTE
Currently on Synthroid 112 MCG's daily.  Last TSH slightly above reference range.  We will repeat TSH.

## 2022-05-12 NOTE — ASSESSMENT & PLAN NOTE
Ears cleaned in the office today.  Audiology referral placed for hearing assessment.  Patient has risk factors of exposure to loud sounds.

## 2022-05-12 NOTE — PROGRESS NOTES
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  -Mild hyponatremia on last labs, will need to repeat and may be attributed to chronic beer and hydrochlorothiazide use    Patient Active Problem List    Diagnosis Date Noted   • Hyponatremia 05/11/2022   • Bilateral impacted cerumen 05/11/2022   • Oropharyngeal dysphagia 05/11/2022   • Bilateral hearing loss 05/11/2022   • Alcohol use 10/14/2021   • Hypothyroidism (acquired) 10/14/2021   • Obstructive sleep apnea 05/15/2020   • GERD (gastroesophageal reflux disease) 03/28/2013   • Benign paroxysmal vertigo of both ears 03/28/2013   • Coronary artery disease due to lipid rich plaque 10/21/2011   • MI (myocardial infarction) ([facility]) 10/21/2011   • Stented coronary artery 10/21/2011   • Essential hypertension 10/21/2011   • Dyslipidemia 10/21/2011       Current Outpatient Medications   Medication Sig Dispense Refill   • losartan-hydrochlorothiazide (HYZAAR) 100-25 MG per tablet Take 1 Tablet by mouth every day. 90 Tablet 3   • carvedilol (COREG) 12.5 MG Tab Take 1 Tablet by mouth 2 times a day with meals. 180 Tablet 1   • levothyroxine (SYNTHROID) 112 MCG Tab TAKE 1 TABLET BY MOUTH EVERY MORNING ON AN EMPTY STOMACH 90 Tablet 0   • omeprazole (PRILOSEC) 40 MG delayed-release capsule TAKE 1 CAPSULE BY MOUTH EVERY DAY 90 Capsule 0   •  "rosuvastatin (CRESTOR) 20 MG Tab Take 1 Tablet by mouth every evening. 90 Tablet 3   • b complex vitamins tablet B COMPLEX TABS     • aspirin (ASA) 81 MG Chew Tab chewable tablet 81 mg.     • Potassium 99 MG TABS Take 1 Tab by mouth every morning.       No current facility-administered medications for this visit.       ROS: As per HPI. Additional pertinent systems as noted below.  Constitutional: Negative for chills and fever. Reports fatigue has improved.  HENT: Negative for ear pain and sore throat.  Positive for oropharyngeal dysphagia.  Eyes: Negative for discharge and redness.   Respiratory: Negative for cough, hemoptysis, wheezing and stridor.    Cardiovascular: Negative for chest pain, palpitations and leg swelling.   Gastrointestinal: Negative for abdominal pain, constipation, diarrhea, heartburn, nausea and vomiting.   Genitourinary: Negative for dysuria, flank pain and hematuria.   Musculoskeletal: Negative for falls and myalgias.   Skin: Negative for rash.   Neurological: Negative for dizziness  Psychiatric/Behavioral: Positive for alcohol use and stable mood      BP (!) 146/87 (BP Location: Left arm, Patient Position: Sitting, BP Cuff Size: Adult)   Pulse 78   Temp 36.6 °C (97.9 °F) (Temporal)   Ht 1.803 m (5' 11\")   Wt 102 kg (224 lb 6.4 oz)   SpO2 94%   BMI 31.30 kg/m²     Physical Exam   Constitutional:  oriented to person, place, and time. No distress.   Eyes: EOMI. No scleral icterus.  Neck: NC/AT   Cardiovascular: Normal rate, regular rhythm and normal heart sounds.  Exam reveals no gallop and no friction rub.  No murmur heard.  Pulmonary/Chest: Breath sounds normal.  No wheezing, rales, rhonchi.  Musculoskeletal:   no edema.  No acute deficits.  Neurological: No acute focal or sensory deficit, cranial nerves II through XII grossly intact  Skin: No cyanosis. Nails show no clubbing.  Other: Bilateral earwax    Note: I have reviewed all pertinent labs and diagnostic tests associated with this " visit with specific comments listed under the assessment and plan below    Assessment and Plan    Essential hypertension  Goal less than 130/80, patient not at goal.  We will increase his Coreg to 12.5 mg twice daily and continue with Hyzaar 100-25    Stented coronary artery  Recently saw cardiology, stable not complaining of chest pain, dyspnea, palpitations, or edema.  Has good functional status.  Continue aspirin, statin, beta-blocker, and blood pressure regiment with goal less than 130/80.    Dyslipidemia  Continue Crestor, patient tolerating well.  Repeat LDL at next follow-up.    GERD (gastroesophageal reflux disease)  Stable on omeprazole, continue    Obstructive sleep apnea  Patient had a couple incomplete home sleep evaluations.  He is interested in repeating sleep eval.  Home sleep assessment ordered.    Stop bang score of 4, high risk for NGHIA    Alcohol use  Continues to drink 4-5 beers daily.  Advised him to cut down to 1 or 2 beers per day.    Hypothyroidism (acquired)  Currently on Synthroid 112 MCG's daily.  Last TSH slightly above reference range.  We will repeat TSH.    Hyponatremia  130 on last BMP, asymptomatic.  May be related to chronic beer and hydrochlorothiazide use.  Advised him to cut down his beer drinking and will repeat BMP.  If remains decreased or worse, will likely substitute hydrochlorothiazide for a different agent.    Bilateral impacted cerumen  Ears cleaned in the office today.  Audiology referral placed for hearing assessment.  Patient has risk factors of exposure to loud sounds.    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  States he has had gradual hearing loss.  Audiology referral placed and earwax cleaned in the office today.    Oropharyngeal dysphagia  Patient and wife report that patient often chokes with eating.  Placed a speech pathology evaluation referral.      Followup: Return in about 6 months (around  11/11/2022).      Signed by: Huy Meek M.D.

## 2022-05-12 NOTE — ASSESSMENT & PLAN NOTE
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  States he has had gradual hearing loss.  Audiology referral placed and earwax cleaned in the office today.

## 2022-05-25 ENCOUNTER — OFFICE VISIT (OUTPATIENT)
Dept: INTERNAL MEDICINE | Facility: OTHER | Age: 72
End: 2022-05-25
Payer: MEDICARE

## 2022-05-25 VITALS
HEART RATE: 76 BPM | BODY MASS INDEX: 30.83 KG/M2 | TEMPERATURE: 98.3 F | DIASTOLIC BLOOD PRESSURE: 81 MMHG | WEIGHT: 220.2 LBS | HEIGHT: 71 IN | SYSTOLIC BLOOD PRESSURE: 149 MMHG | OXYGEN SATURATION: 98 %

## 2022-05-25 DIAGNOSIS — H61.23 CERUMEN DEBRIS ON TYMPANIC MEMBRANE, BILATERAL: ICD-10-CM

## 2022-05-25 DIAGNOSIS — I10 ESSENTIAL HYPERTENSION: ICD-10-CM

## 2022-05-25 PROCEDURE — 99213 OFFICE O/P EST LOW 20 MIN: CPT | Mod: GE

## 2022-05-25 RX ORDER — CARVEDILOL 12.5 MG/1
18.75 TABLET ORAL 2 TIMES DAILY WITH MEALS
Qty: 90 TABLET | Refills: 3 | Status: SHIPPED | OUTPATIENT
Start: 2022-05-25 | End: 2022-06-24

## 2022-05-25 ASSESSMENT — FIBROSIS 4 INDEX: FIB4 SCORE: 1.01

## 2022-05-25 NOTE — PROGRESS NOTES
Date of Service:  5/26/2022    CC: Ear lavage    HPI:  Rodney Arias  is a 72 y.o. male with a PMH of CAD (s/p MI and stent), hypertension, dyslipidemia, GERD, NGHIA and hypothyroidism. Presents for ear lavage in order to remove ear wax for hearing aid fittings. He doesn't use que tips. He has not noticed any changes with his hearing, or no ringing in his ears. Does notice that he does have problems hearing when there is a lot of background noise.     He also has had an increase in blood pressure medications recently. He has been taking Coreg 12.5 mg twice per day. Blood pressure was 149/81. He has not been taking his blood pressure at home, no lightheadedness or dizziness, no headaches or chest pain.       Review of systems:  Review of Systems   Constitutional: Negative for chills, fever and malaise/fatigue.   HENT: Positive for hearing loss. Negative for ear discharge, ear pain, nosebleeds and tinnitus.    Eyes: Negative.    Respiratory: Negative for shortness of breath.    Cardiovascular: Negative for chest pain and palpitations.   Gastrointestinal: Negative.    Genitourinary: Negative.    Musculoskeletal: Negative.    Skin: Negative.         Past Medical History:  Patient Active Problem List    Diagnosis Date Noted   • Hyponatremia 05/11/2022   • Cerumen debris on tympanic membrane, bilateral 05/11/2022   • Oropharyngeal dysphagia 05/11/2022   • Bilateral hearing loss 05/11/2022   • Alcohol use 10/14/2021   • Hypothyroidism (acquired) 10/14/2021   • Obstructive sleep apnea 05/15/2020   • GERD (gastroesophageal reflux disease) 03/28/2013   • Benign paroxysmal vertigo of both ears 03/28/2013   • Coronary artery disease due to lipid rich plaque 10/21/2011   • MI (myocardial infarction) (HCC) 10/21/2011   • Stented coronary artery 10/21/2011   • Essential hypertension 10/21/2011   • Dyslipidemia 10/21/2011       Past Surgical History:    has a past surgical history that includes angioplasty and zzz cardiac  cath.    Medications:  Current Outpatient Medications   Medication Sig Dispense Refill   • carvedilol (COREG) 12.5 MG Tab Take 1.5 Tablets by mouth 2 times a day with meals for 30 days. 90 Tablet 3   • losartan-hydrochlorothiazide (HYZAAR) 100-25 MG per tablet Take 1 Tablet by mouth every day. 90 Tablet 3   • levothyroxine (SYNTHROID) 112 MCG Tab TAKE 1 TABLET BY MOUTH EVERY MORNING ON AN EMPTY STOMACH 90 Tablet 0   • omeprazole (PRILOSEC) 40 MG delayed-release capsule TAKE 1 CAPSULE BY MOUTH EVERY DAY 90 Capsule 0   • rosuvastatin (CRESTOR) 20 MG Tab Take 1 Tablet by mouth every evening. 90 Tablet 3   • b complex vitamins tablet B COMPLEX TABS     • aspirin (ASA) 81 MG Chew Tab chewable tablet 81 mg.     • Potassium 99 MG TABS Take 1 Tab by mouth every morning.       No current facility-administered medications for this visit.       Allergies:  Allergies   Allergen Reactions   • Nkda [No Known Drug Allergy]        Family History:   family history is not on file.     Social History:    Social History     Tobacco Use   • Smoking status: Never Smoker   • Smokeless tobacco: Never Used   Vaping Use   • Vaping Use: Never used   Substance Use Topics   • Alcohol use: Yes     Alcohol/week: 12.6 oz     Types: 21 Cans of beer per week     Comment: 3 beers a day   • Drug use: No       Physical Exam:  Vitals:    05/25/22 0929   BP: (!) 149/81   Pulse: 76   Temp: 36.8 °C (98.3 °F)   SpO2: 98%     Body mass index is 30.71 kg/m².  Physical Exam  Constitutional:       Appearance: Normal appearance. He is obese. He is not toxic-appearing or diaphoretic.   HENT:      Head: Normocephalic and atraumatic.      Right Ear: There is impacted cerumen.      Left Ear: There is impacted cerumen.      Mouth/Throat:      Mouth: Mucous membranes are moist.      Pharynx: Oropharynx is clear. No oropharyngeal exudate or posterior oropharyngeal erythema.   Eyes:      General:         Right eye: No discharge.         Left eye: No discharge.       Pupils: Pupils are equal, round, and reactive to light.   Cardiovascular:      Rate and Rhythm: Normal rate and regular rhythm.      Pulses: Normal pulses.      Heart sounds: No murmur heard.    No friction rub. No gallop.   Pulmonary:      Effort: Pulmonary effort is normal. No respiratory distress.      Breath sounds: No wheezing or rales.   Abdominal:      General: Abdomen is flat.      Tenderness: There is no right CVA tenderness or left CVA tenderness.   Musculoskeletal:         General: Normal range of motion.      Right lower leg: No edema.      Left lower leg: No edema.   Neurological:      Mental Status: He is alert.          Labs:  none    Imaging:  none    Assessment/Plan:  Essential hypertension  Patient has had increased blood pressure readings. No signs of lightheadedness, dizziness or headaches. Currently on losartan-HCTZ 100-25 mg, and carvedilol 12.5 mg BID.     Plan:  -Encouraged patient to take blood pressure at home  -Increased carvedilol to 1.5 pills per day (18.75)    Cerumen debris on tympanic membrane, bilateral  Patient has bilateral impacted cerumen on both sides. Used lavage to clear out ears. Right side was cleared fully, left continued to be impacted.     Plan:  -Debrox in left ear as directed, for the next few days  -Return for another evaluation and ear lavage           All imaging results and lab results and consult notes are reviewed at this visit.  Followup: Return in about 2 years (around 5/25/2024).    Marisabel Ye MD  Internal Medicine PGY-1

## 2022-05-25 NOTE — PATIENT INSTRUCTIONS
Increased Coreg dose to 1.5 pills, twice per day.     2. Ear drops over the counter to loosen ear wax on right side

## 2022-05-27 ENCOUNTER — OFFICE VISIT (OUTPATIENT)
Dept: INTERNAL MEDICINE | Facility: OTHER | Age: 72
End: 2022-05-27
Payer: MEDICARE

## 2022-05-27 VITALS
DIASTOLIC BLOOD PRESSURE: 78 MMHG | OXYGEN SATURATION: 94 % | TEMPERATURE: 98.2 F | HEART RATE: 70 BPM | SYSTOLIC BLOOD PRESSURE: 134 MMHG | BODY MASS INDEX: 30.97 KG/M2 | HEIGHT: 71 IN | WEIGHT: 221.2 LBS

## 2022-05-27 DIAGNOSIS — H61.23 CERUMEN DEBRIS ON TYMPANIC MEMBRANE, BILATERAL: ICD-10-CM

## 2022-05-27 PROCEDURE — 99213 OFFICE O/P EST LOW 20 MIN: CPT | Mod: GE | Performed by: STUDENT IN AN ORGANIZED HEALTH CARE EDUCATION/TRAINING PROGRAM

## 2022-05-27 ASSESSMENT — ENCOUNTER SYMPTOMS
CHILLS: 0
SHORTNESS OF BREATH: 0
MUSCULOSKELETAL NEGATIVE: 1
FEVER: 0
EYES NEGATIVE: 1
GASTROINTESTINAL NEGATIVE: 1
PALPITATIONS: 0

## 2022-05-27 ASSESSMENT — FIBROSIS 4 INDEX: FIB4 SCORE: 1.01

## 2022-05-27 NOTE — ASSESSMENT & PLAN NOTE
Patient has had increased blood pressure readings. No signs of lightheadedness, dizziness or headaches. Currently on losartan-HCTZ 100-25 mg, and carvedilol 12.5 mg BID.     Plan:  -Encouraged patient to take blood pressure at home  -Increased carvedilol to 1.5 pills per day (18.75)

## 2022-05-27 NOTE — PATIENT INSTRUCTIONS
We have performed ear lavage in Left ear for you today.     Followup with your PCP for chronic medical problems.

## 2022-05-27 NOTE — PROGRESS NOTES
Established Patient    Chief Complaint   Patient presents with   • Cerumen Impaction     Patient was here 2 days ago and ear lavage was not successful (left)       HPI: Rodney Arias is a 72 y.o. male presents to the clinic requesting for ear lavage.    He was seen in the clinic 2 days ago, underwent ear lavage which was successful in the right ear but cerumen could not be completely cleared and was advised to try debrox ear drops for the left ear and come back again for lavage in the left ear. Informs that he an appt later today for evaluation of B/L hearing aids.      Patient Active Problem List    Diagnosis Date Noted   • Hyponatremia 05/11/2022   • Cerumen debris on tympanic membrane, bilateral 05/11/2022   • Oropharyngeal dysphagia 05/11/2022   • Bilateral hearing loss 05/11/2022   • Alcohol use 10/14/2021   • Hypothyroidism (acquired) 10/14/2021   • Obstructive sleep apnea 05/15/2020   • GERD (gastroesophageal reflux disease) 03/28/2013   • Benign paroxysmal vertigo of both ears 03/28/2013   • Coronary artery disease due to lipid rich plaque 10/21/2011   • MI (myocardial infarction) ([facility]) 10/21/2011   • Stented coronary artery 10/21/2011   • Essential hypertension 10/21/2011   • Dyslipidemia 10/21/2011       Current Outpatient Medications   Medication Sig Dispense Refill   • carvedilol (COREG) 12.5 MG Tab Take 1.5 Tablets by mouth 2 times a day with meals for 30 days. 90 Tablet 3   • losartan-hydrochlorothiazide (HYZAAR) 100-25 MG per tablet Take 1 Tablet by mouth every day. 90 Tablet 3   • levothyroxine (SYNTHROID) 112 MCG Tab TAKE 1 TABLET BY MOUTH EVERY MORNING ON AN EMPTY STOMACH 90 Tablet 0   • omeprazole (PRILOSEC) 40 MG delayed-release capsule TAKE 1 CAPSULE BY MOUTH EVERY DAY 90 Capsule 0   • rosuvastatin (CRESTOR) 20 MG Tab Take 1 Tablet by mouth every evening. 90 Tablet 3   • b complex vitamins tablet B COMPLEX TABS     • aspirin (ASA) 81 MG Chew Tab chewable tablet 81 mg.     •  "Potassium 99 MG TABS Take 1 Tab by mouth every morning.       No current facility-administered medications for this visit.       ROS: As per HPI. Additional pertinent systems as noted below.  Constitutional:  Negative for fever, chills   HENT:  Negative for ear pain, sore throat, runny nose   Eyes:  Negative for blurred vision and double vision   Cardiovascular:  Negative for chest pain, palpitations   Respiratory:  Negative for cough, sputum production, shortness of breath   Gastrointestinal: Negative for abdominal pain, nausea, vomiting, diarrhea, or blood in stools       /78 (BP Location: Right arm, Patient Position: Sitting, BP Cuff Size: Adult)   Pulse 70   Temp 36.8 °C (98.2 °F) (Temporal)   Ht 1.803 m (5' 11\")   Wt 100 kg (221 lb 3.2 oz)   SpO2 94%   BMI 30.85 kg/m²     Physical Exam   Constitutional:  Well developed, well nourished. Not in acute distress   HENT:  Normocephalic, Atraumatic, Oropharynx is pink and moist. No oral exudates, Nose normal   Ear: Tympanic membrane/EAC normal with no signs of inflammation. Cerumen noted in the left ear with no clear visualization of tympanic membrane.  Cardiovascular:  Regular rate and rhythm, No pedal edema, Intact distal pulses   Respiratory: Clear to auscultation bilaterally, No use of accessory muscles   Gastrointestinal: Bowel sounds normal, Soft, No tenderness, No palpable masses/hepatosplenomegaly        Note: I have reviewed all pertinent labs and diagnostic tests associated with this visit with specific comments listed under the assessment and plan below    Assessment and Plan  Cerumen debris on tympanic membrane, bilateral  Ear lavage performed for the left ear  Has an appt later today for evaluation of hearing aids.        Followup: Return if symptoms worsen or fail to improve.    Patient discussed with attending.    Signed by: Danni Melo M.D.    Please note that this dictation was created using voice recognition software. I have made " every reasonable attempt to correct obvious errors, but I expect that there are errors of grammar and possibly content that I did not discover before finalizing the note.

## 2022-05-27 NOTE — ASSESSMENT & PLAN NOTE
Patient has bilateral impacted cerumen on both sides. Used lavage to clear out ears. Right side was cleared fully, left continued to be impacted.     Plan:  -Debrox in left ear as directed, for the next few days  -Return for another evaluation and ear lavage

## 2022-06-08 ENCOUNTER — SPEECH THERAPY (OUTPATIENT)
Dept: SPEECH THERAPY | Facility: REHABILITATION | Age: 72
End: 2022-06-08
Attending: STUDENT IN AN ORGANIZED HEALTH CARE EDUCATION/TRAINING PROGRAM
Payer: MEDICARE

## 2022-06-08 DIAGNOSIS — R13.12 OROPHARYNGEAL DYSPHAGIA: ICD-10-CM

## 2022-06-08 PROCEDURE — 92610 EVALUATE SWALLOWING FUNCTION: CPT

## 2022-06-08 NOTE — OP THERAPY EVALUATION
"  Outpatient Speech Therapy  INITIAL EVALUATION    [address]  [address]  [facility] [address]  Phone:  [phone]  Fax:  [phone]    Date of Evaluation: 06/08/2022    Patient: Rodney Arias  YOB: 1950  MRN: [id number]     Referring Provider: Huy Meek M.D.  [address] [place] [address]   Referring Diagnosis Oropharyngeal dysphagia [R13.12]     Time Calculation    Start time: 0845  Stop time: 0918 Time Calculation (min): 33 minutes           Chief Complaint: Cough and Choking    Visit Diagnoses     ICD-10-CM   1. Oropharyngeal dysphagia  R13.12     Subjective:   Reason for Therapy:     Reason For Evaluation:  Dysphagia  Social Support:     Patient Mental Status:  Alert and Responsive  Therapy History:     Previous Diet:  Normal Diet    Current Diet:  Thin Liquids    Hearing:  Hearing Aids (Bilateral)  Additional Subjective Comments:      Patient is a 72 y.o. male with history of MI and HTN.  Patient presents today with complaints of coughing and choking with certain foods that has been \"happening for years\".  Patient initially was not sure that he needed a swallow evaluation, but following education re: what eval and treatment would entail, Patient agreed to continue with assessment.  Patient reports that he frequently coughs with particulate foods, like peanuts and dry crumbs.  Patient reports that other foods stick in his throat and he sometimes needs to leave the table due to excessive coughing.  This happens weekly or biweekly.  Patient has not noticed any coughing or difficulty with swallowing liquids, and reports no difficulty swallowing medications.  Patient has been referred to outpatient speech therapy for a clinical swallow evaluation.    Past Medical History:   Diagnosis Date   • CAD (coronary artery disease) 10/21/2011   • Dyslipidemia 10/21/2011   • HTN (hypertension) 10/21/2011   • MI (myocardial infarction) (HCC) " 10/21/2011   • Myocardial infarction (HCC)     with stent placement   • Stented coronary artery 10/21/2011     Past Surgical History:   Procedure Laterality Date   • ANGIOPLASTY     • ZZZ CARDIAC CATH       Objective:   Other Treatment Interventions:  Clinical Swallow Evaluation  Treatment Intervention tool(s) used:  Yudelka Swallow, EAT-10, SAFE        Objective Details:  The Yudelka Swallow Protocol, Eating Assessment Tool (EAT-10), Swallowing Ability and Function Evaluation (SAFE) were administered to the patient with the following results:    The Yudelka Swallow requires the patient to drink 3 oz water in sequential swallows without stopping, with assessment of interrupted drinking and coughing or choking during or immediately after completion of drinking.    Yudelka: Pass  Patient was able to swallow 3 oz with no difficulty or overt s/sx aspiration/penetration.    The Eating Assessment Tool (EAT-10) is a patient administered, self-assessment of possible difficulties that may be encountered as a result of difficulty in swallow.  Scores greater than 3 can be indicative of changes in swallow consistent with dysphagia.     EAT-10 score: 7/40  Pleasure of eating is affected by swallow, food sticks in throat, cough when eating.    The Swallowing Ability and Function Evaluation is used to identify specific problems occurring during the oral and pharyngeal phases of swallowing.    SAFE:  (Subscale, Raw Score, Stanine, Severity)  Physical Evaluation 75;9  WNL  Oral Phase  21;9 WNL  Pharyngeal Phase  15;5  Moderate        Speech Therapy Assessment:     Oral Motor Status:     Oral motor status comments: Oral Mechanism Exam:      -Dentition: Full dentures   -Facial-WFL  -Labial: WFL   -Lingual: WFL   -Palatal Elevation: WFL   -Jaw:  WFL  -Laryngeal Elevation: Perceived mildly reduced   -Volitional Cough: Strong   -Vocal Quality: Strong, clear   - Circumlaryngeal Palpation: No pain or edema       Oral Phase Assessment:     Oral phase  comments: Patient was observed during PO trials of 3 oz water, puree, and eileen cracker.  Patient demonstrated adequate lip seal, oral prep and mastication, and timely A/P transfer. No significant oral residuals or pocketing were observed. Multiple swallows were not required to clear oral cavity. No report of nasal regurgitation.      Pharyngeal Phase Assessment:     Pharyngeal phase comments: No delay in pharyngeal swallow was appreciated via palpation with perceived mildly reduced laryngeal elevation.  Patient swallowed 2-3 times but did not report felling residuals with consistencies tested. No coughing or change in vocal quality was observed, but patient does report frequent coughing with meals at home.     Speech Therapy Plan :   Prognosis & Recommendations  Impression Summary:  Patient presents with mild oropharyngeal dysphagia characterized by decreased laryngeal elevation, multiple swallows required to clear pharynx, and complaints of food being stuck in throat during meals at home.  Recommend continuation of regular diet with thin liquids using safe swallowing strategies. Patient would benefit from skilled speech therapy to provide education re: safe swallowing strategies and food modifications, as well as oral motor exercises to strengthen oral mechanism to improve safety of swallow.  Patient verbalized understanding and agreement with current plan of care.   Prognosis:  Excellent  Compensatory swallow strategies:  Upright position 90 degrees during meal and 45 minutes after meal, Reduce bolus size, Alternate liquids/solids, Multiple swallows and No talking while eating  Diet Recommendation:  Normal Consistency  Liquid Recommendation:  Thin  Goals  Short Term Goals:  1.  Patient will improve safety in swallow implementing compensatory swallow strategies with minimal verbal cues during therapist directed trials and per patient report 85% accuracy.   2.  Patient will improve safety in swallow completing  structured exercise addressing oral, pharyngeal phases of swallow completing to 85% accuracy given min verbal cues.    3.  Patient will tolerate current diet with no observed or reported s/sx aspiration/penetration.   Short Term Goal Duration (Weeks):  2-4 weeks  Long Term Goals:  Patient to consume the least restrictive diet to maintain adequate nutrition/hydration 90% as evidenced by no overt signs or symptoms of aspiration.  Long Term Goal Duration (Weeks):  2-4 weeks  Potential barriers to Goal Achievement:  None  Therapy Recommendations  Recommendation:  Dysphagia Treatment and Individual Speech Therapy,  Planned Therapy Interventions:  Dysphagia treatment, Compensatory Strategy Training, Patient/Caregiver Education and Home Program,   Frequency:  1x week  Duration (in visits):  4 (4 X [phone])  Duration (in weeks):  4        Referring provider co-signature:  I have reviewed this plan of care and my co-signature certifies the need for services.    Certification Period: 06/08/2022 to  07/06/22    Physician Signature: ________________________________ Date: ______________

## 2022-06-13 ENCOUNTER — APPOINTMENT (OUTPATIENT)
Dept: SPEECH THERAPY | Facility: REHABILITATION | Age: 72
End: 2022-06-13
Attending: STUDENT IN AN ORGANIZED HEALTH CARE EDUCATION/TRAINING PROGRAM
Payer: MEDICARE

## 2022-06-22 ENCOUNTER — APPOINTMENT (OUTPATIENT)
Dept: SPEECH THERAPY | Facility: REHABILITATION | Age: 72
End: 2022-06-22
Attending: STUDENT IN AN ORGANIZED HEALTH CARE EDUCATION/TRAINING PROGRAM
Payer: MEDICARE

## 2022-06-27 ENCOUNTER — APPOINTMENT (OUTPATIENT)
Dept: SPEECH THERAPY | Facility: REHABILITATION | Age: 72
End: 2022-06-27
Attending: STUDENT IN AN ORGANIZED HEALTH CARE EDUCATION/TRAINING PROGRAM
Payer: MEDICARE

## 2022-06-29 ENCOUNTER — APPOINTMENT (OUTPATIENT)
Dept: SPEECH THERAPY | Facility: REHABILITATION | Age: 72
End: 2022-06-29
Attending: STUDENT IN AN ORGANIZED HEALTH CARE EDUCATION/TRAINING PROGRAM
Payer: MEDICARE

## 2022-07-06 ENCOUNTER — APPOINTMENT (OUTPATIENT)
Dept: SPEECH THERAPY | Facility: REHABILITATION | Age: 72
End: 2022-07-06
Attending: STUDENT IN AN ORGANIZED HEALTH CARE EDUCATION/TRAINING PROGRAM
Payer: MEDICARE

## 2022-07-07 ENCOUNTER — HOSPITAL ENCOUNTER (OUTPATIENT)
Facility: MEDICAL CENTER | Age: 72
End: 2022-07-07
Attending: STUDENT IN AN ORGANIZED HEALTH CARE EDUCATION/TRAINING PROGRAM
Payer: MEDICARE

## 2022-07-07 DIAGNOSIS — E03.9 HYPOTHYROIDISM (ACQUIRED): ICD-10-CM

## 2022-07-07 DIAGNOSIS — E87.1 HYPONATREMIA: ICD-10-CM

## 2022-07-07 LAB
ANION GAP SERPL CALC-SCNC: 12 MMOL/L (ref 7–16)
BUN SERPL-MCNC: 13 MG/DL (ref 8–22)
CALCIUM SERPL-MCNC: 9.3 MG/DL (ref 8.5–10.5)
CHLORIDE SERPL-SCNC: 92 MMOL/L (ref 96–112)
CO2 SERPL-SCNC: 25 MMOL/L (ref 20–33)
CREAT SERPL-MCNC: 0.85 MG/DL (ref 0.5–1.4)
GFR SERPLBLD CREATININE-BSD FMLA CKD-EPI: 91 ML/MIN/1.73 M 2
GLUCOSE SERPL-MCNC: 105 MG/DL (ref 65–99)
POTASSIUM SERPL-SCNC: 4.2 MMOL/L (ref 3.6–5.5)
SODIUM SERPL-SCNC: 129 MMOL/L (ref 135–145)
TSH SERPL DL<=0.005 MIU/L-ACNC: 5.61 UIU/ML (ref 0.38–5.33)

## 2022-07-07 PROCEDURE — 80048 BASIC METABOLIC PNL TOTAL CA: CPT

## 2022-07-07 PROCEDURE — 84443 ASSAY THYROID STIM HORMONE: CPT

## 2022-07-11 ENCOUNTER — APPOINTMENT (OUTPATIENT)
Dept: SPEECH THERAPY | Facility: REHABILITATION | Age: 72
End: 2022-07-11
Attending: STUDENT IN AN ORGANIZED HEALTH CARE EDUCATION/TRAINING PROGRAM
Payer: MEDICARE

## 2022-07-12 NOTE — TELEPHONE ENCOUNTER
Omeprazole Refill    Last seen: 5/27/22 by Dr. Melo  Next appt: 7/13/22 with Dr. Mars    Was the patient seen in the last year in this department? Yes   Does patient have an active prescription for medications requested? No   Received Request Via: Pharmacy

## 2022-07-13 ENCOUNTER — OFFICE VISIT (OUTPATIENT)
Dept: INTERNAL MEDICINE | Facility: OTHER | Age: 72
End: 2022-07-13
Payer: MEDICARE

## 2022-07-13 VITALS
SYSTOLIC BLOOD PRESSURE: 137 MMHG | HEART RATE: 67 BPM | HEIGHT: 71 IN | DIASTOLIC BLOOD PRESSURE: 82 MMHG | TEMPERATURE: 98 F | OXYGEN SATURATION: 97 % | BODY MASS INDEX: 31.05 KG/M2 | WEIGHT: 221.8 LBS

## 2022-07-13 DIAGNOSIS — I10 ESSENTIAL HYPERTENSION: ICD-10-CM

## 2022-07-13 DIAGNOSIS — E87.1 HYPONATREMIA: ICD-10-CM

## 2022-07-13 DIAGNOSIS — E03.9 HYPOTHYROIDISM (ACQUIRED): ICD-10-CM

## 2022-07-13 PROCEDURE — 99214 OFFICE O/P EST MOD 30 MIN: CPT | Mod: GC | Performed by: STUDENT IN AN ORGANIZED HEALTH CARE EDUCATION/TRAINING PROGRAM

## 2022-07-13 RX ORDER — AMLODIPINE BESYLATE 5 MG/1
5 TABLET ORAL DAILY
Qty: 30 TABLET | Refills: 0 | Status: SHIPPED | OUTPATIENT
Start: 2022-07-13 | End: 2022-08-15

## 2022-07-13 RX ORDER — AMLODIPINE BESYLATE 5 MG/1
5 TABLET ORAL DAILY
Qty: 90 TABLET | OUTPATIENT
Start: 2022-07-13

## 2022-07-13 RX ORDER — OMEPRAZOLE 40 MG/1
40 CAPSULE, DELAYED RELEASE ORAL
Qty: 90 CAPSULE | Refills: 1 | Status: SHIPPED | OUTPATIENT
Start: 2022-07-13 | End: 2023-03-17

## 2022-07-13 RX ORDER — LEVOTHYROXINE SODIUM 0.12 MG/1
125 TABLET ORAL
Qty: 30 TABLET | Refills: 0 | Status: SHIPPED | OUTPATIENT
Start: 2022-07-13 | End: 2022-08-15

## 2022-07-13 RX ORDER — LEVOTHYROXINE SODIUM 0.12 MG/1
125 TABLET ORAL
Qty: 90 TABLET | OUTPATIENT
Start: 2022-07-13

## 2022-07-13 RX ORDER — LOSARTAN POTASSIUM 100 MG/1
100 TABLET ORAL DAILY
Qty: 90 TABLET | Refills: 0 | Status: SHIPPED | OUTPATIENT
Start: 2022-07-13 | End: 2022-11-29

## 2022-07-13 RX ORDER — LOSARTAN POTASSIUM 100 MG/1
100 TABLET ORAL DAILY
Qty: 30 TABLET | Refills: 0 | Status: SHIPPED | OUTPATIENT
Start: 2022-07-13 | End: 2022-07-13

## 2022-07-13 ASSESSMENT — FIBROSIS 4 INDEX: FIB4 SCORE: 1.01

## 2022-07-13 NOTE — TELEPHONE ENCOUNTER
Losartan Refill    Last seen: 7/13/22 by Dr. Mars  Next appt: None    Was the patient seen in the last year in this department? Yes   Does patient have an active prescription for medications requested? No   Received Request Via: Pharmacy

## 2022-07-13 NOTE — PATIENT INSTRUCTIONS
-Stop taking the losartan and HCTZ combination pill. Only start taking losartan 100mg daily.  -Increase the dose of your levothyroxine to 125mcg per day.  -Start taking the amlodipine 5mg daily.  -Have your labs rechecked in 6 weeks.  -Continue to keep checking your blood pressure at home.  -I will let you know if you need to follow-up after your lab work.

## 2022-07-13 NOTE — TELEPHONE ENCOUNTER
Levothyroxine and Amlodipine Refills    Last seen: 7/13/22 by Dr. Mars  Next appt: None    Was the patient seen in the last year in this department? Yes   Does patient have an active prescription for medications requested? No   Received Request Via: Pharmacy

## 2022-07-14 RX ORDER — CARVEDILOL 12.5 MG/1
12.5 TABLET ORAL 2 TIMES DAILY WITH MEALS
COMMUNITY
Start: 2022-07-14 | End: 2023-04-26

## 2022-07-14 ASSESSMENT — ENCOUNTER SYMPTOMS
VOMITING: 0
HEMOPTYSIS: 0
DOUBLE VISION: 0
FEVER: 0
CONSTIPATION: 0
BLURRED VISION: 0
BLOOD IN STOOL: 0
NAUSEA: 0
CHILLS: 0
COUGH: 1
PALPITATIONS: 0
DEPRESSION: 0
SHORTNESS OF BREATH: 0
DIARRHEA: 0
SPUTUM PRODUCTION: 0

## 2022-07-14 NOTE — PROGRESS NOTES
Subjective:     CC: Hyponatremia follow-up    HPI:   Patient is a 72-year-old male with past medical history of coronary disease with stent placement, hypertension, dyslipidemia, GERD, NGHIA, hypothyroidism presents to clinic today for an acute visit for further evaluation of his hyponatremia and uncontrolled hypothyroidism.    Patient was found to have a repeat sodium level of 129 on his labs.  Patient denies any concerning symptoms at this time.  He reports developing a cough over the past few weeks, however denies shortness of breath, weight loss, night sweats, or reduced appetite.  of note, patient has no history of smoking.  Patient also denies starting any over-the-counter medications or herbal supplements at this time and reports that the last medication that was adjusted was his carvedilol which was increased to 18.75 mg twice daily.  Patient reports that he continues to drink about 4-5 alcoholic beverages per night.  Patient also reports drinking 5-6 bottles of water every day.  Patient reports has been on the losartan hydrochlorothiazide combination pill for fevers without any complications.  Patient reports his blood pressure has been ranging from 125- 140/80 at home with an average systolic pressure of 135.    Patient reports that he is compliant with his levothyroxine and denies any complaints of palpitations, diaphoresis, anxiety, or diarrhea.    No other complaints at this time.      No problems updated.    Health Maintenance: Completed    ROS:  Review of Systems   Constitutional: Negative for chills and fever.   HENT: Negative for ear pain, hearing loss and tinnitus.    Eyes: Negative for blurred vision and double vision.   Respiratory: Positive for cough. Negative for hemoptysis, sputum production and shortness of breath.    Cardiovascular: Negative for chest pain, palpitations and leg swelling.   Gastrointestinal: Negative for blood in stool, constipation, diarrhea, melena, nausea and vomiting.  "  Genitourinary: Negative for dysuria and urgency.   Skin: Negative for itching and rash.   Psychiatric/Behavioral: Negative for depression and suicidal ideas.       Objective:     Exam:  /82 (BP Location: Right arm, Patient Position: Sitting, BP Cuff Size: Adult)   Pulse 67   Temp 36.7 °C (98 °F) (Temporal)   Ht 1.803 m (5' 11\")   Wt 101 kg (221 lb 12.8 oz)   SpO2 97%   BMI 30.93 kg/m²  Body mass index is 30.93 kg/m².    Physical Exam  Constitutional:       General: He is not in acute distress.     Appearance: Normal appearance. He is normal weight. He is not ill-appearing, toxic-appearing or diaphoretic.   HENT:      Head: Normocephalic and atraumatic.      Mouth/Throat:      Mouth: Mucous membranes are moist.      Pharynx: Oropharynx is clear. No oropharyngeal exudate or posterior oropharyngeal erythema.   Eyes:      General: No scleral icterus.        Right eye: No discharge.         Left eye: No discharge.      Conjunctiva/sclera: Conjunctivae normal.   Cardiovascular:      Rate and Rhythm: Normal rate and regular rhythm.      Pulses: Normal pulses.      Heart sounds: Normal heart sounds. No murmur heard.    No friction rub. No gallop.   Pulmonary:      Effort: Pulmonary effort is normal. No respiratory distress.      Breath sounds: Normal breath sounds. No stridor. No wheezing or rhonchi.   Abdominal:      General: Abdomen is flat. Bowel sounds are normal. There is no distension.      Palpations: Abdomen is soft. There is no mass.      Tenderness: There is no abdominal tenderness.      Hernia: No hernia is present.   Musculoskeletal:         General: No swelling or tenderness.      Right lower leg: No edema.      Left lower leg: No edema.   Skin:     General: Skin is warm and dry.      Coloration: Skin is not pale.      Findings: No erythema or rash.   Neurological:      General: No focal deficit present.      Mental Status: He is alert and oriented to person, place, and time. Mental status is at " baseline.         Labs: Please see results section for further information.    Assessment & Plan:     Patient is a 72-year-old male with past medical history of coronary disease with stent placement, hypertension, dyslipidemia, GERD, NGHIA, hypothyroidism presents to clinic today for an acute visit for further evaluation of his hyponatremia and uncontrolled hypothyroidism.    1. Hyponatremia  - Patient is asymptomatic from his hyponatremia.  - Hyponatremia unlikely due to be recent Coreg dose change.  More likely explanation is hydrochlorothiazide.  - Discontinued losartan hydrochlorothiazide combination pill and continued patient only on losartan while stopping hydrochlorothiazide.  - Continue to monitor for possible evolution of cough or early development of weight loss or fatigue.  - We will recheck CMP in 6 weeks.  - Comp Metabolic Panel; Future    3. Essential hypertension  - Started patient on amlodipine 5 mg to cover for discontinuation of hydrochlorothiazide.  - Follow-up at next visit with blood pressure logs.      Problem List Items Addressed This Visit     Hypothyroidism (acquired)    Relevant Medications    levothyroxine (SYNTHROID) 125 MCG Tab    Other Relevant Orders    TSH+FREE T4    Hyponatremia    Relevant Orders    Comp Metabolic Panel        I spent a total of 45 minutes with record review, exam, communication with the patient, communication with other providers, and documentation of this encounter.      Return if symptoms worsen or fail to improve.

## 2022-08-12 NOTE — TELEPHONE ENCOUNTER
Amlodipine and Levothyroxine Refills    Last seen: 7/13/22 by Dr. Mars  Next appt: None    Was the patient seen in the last year in this department? Yes   Does patient have an active prescription for medications requested? No   Received Request Via: Pharmacy

## 2022-08-15 RX ORDER — AMLODIPINE BESYLATE 5 MG/1
5 TABLET ORAL DAILY
Qty: 90 TABLET | Refills: 0 | Status: SHIPPED | OUTPATIENT
Start: 2022-08-15 | End: 2022-11-18

## 2022-08-15 RX ORDER — LEVOTHYROXINE SODIUM 0.12 MG/1
125 TABLET ORAL
Qty: 90 TABLET | Refills: 0 | Status: SHIPPED | OUTPATIENT
Start: 2022-08-15 | End: 2022-11-18

## 2022-08-19 ENCOUNTER — HOSPITAL ENCOUNTER (OUTPATIENT)
Dept: LAB | Facility: MEDICAL CENTER | Age: 72
End: 2022-08-19
Attending: STUDENT IN AN ORGANIZED HEALTH CARE EDUCATION/TRAINING PROGRAM
Payer: MEDICARE

## 2022-08-19 DIAGNOSIS — E87.1 HYPONATREMIA: ICD-10-CM

## 2022-08-19 LAB
ALBUMIN SERPL BCP-MCNC: 4.8 G/DL (ref 3.2–4.9)
ALBUMIN/GLOB SERPL: 1.8 G/DL
ALP SERPL-CCNC: 67 U/L (ref 30–99)
ALT SERPL-CCNC: 19 U/L (ref 2–50)
ANION GAP SERPL CALC-SCNC: 11 MMOL/L (ref 7–16)
AST SERPL-CCNC: 17 U/L (ref 12–45)
BILIRUB SERPL-MCNC: 0.5 MG/DL (ref 0.1–1.5)
BUN SERPL-MCNC: 17 MG/DL (ref 8–22)
CALCIUM SERPL-MCNC: 9.3 MG/DL (ref 8.5–10.5)
CHLORIDE SERPL-SCNC: 101 MMOL/L (ref 96–112)
CO2 SERPL-SCNC: 27 MMOL/L (ref 20–33)
CREAT SERPL-MCNC: 0.89 MG/DL (ref 0.5–1.4)
GFR SERPLBLD CREATININE-BSD FMLA CKD-EPI: 90 ML/MIN/1.73 M 2
GLOBULIN SER CALC-MCNC: 2.7 G/DL (ref 1.9–3.5)
GLUCOSE SERPL-MCNC: 110 MG/DL (ref 65–99)
POTASSIUM SERPL-SCNC: 4.4 MMOL/L (ref 3.6–5.5)
PROT SERPL-MCNC: 7.5 G/DL (ref 6–8.2)
SODIUM SERPL-SCNC: 139 MMOL/L (ref 135–145)
T4 FREE SERPL-MCNC: 1.66 NG/DL (ref 0.93–1.7)
TSH SERPL DL<=0.005 MIU/L-ACNC: 5.52 UIU/ML (ref 0.38–5.33)

## 2022-08-19 PROCEDURE — 36415 COLL VENOUS BLD VENIPUNCTURE: CPT

## 2022-08-19 PROCEDURE — 80053 COMPREHEN METABOLIC PANEL: CPT

## 2022-08-19 PROCEDURE — 84439 ASSAY OF FREE THYROXINE: CPT

## 2022-08-19 PROCEDURE — 84443 ASSAY THYROID STIM HORMONE: CPT

## 2022-09-12 DIAGNOSIS — E78.5 HYPERLIPIDEMIA, UNSPECIFIED HYPERLIPIDEMIA TYPE: ICD-10-CM

## 2022-09-12 RX ORDER — ROSUVASTATIN CALCIUM 20 MG/1
20 TABLET, COATED ORAL EVERY EVENING
Qty: 90 TABLET | Refills: 0 | Status: SHIPPED | OUTPATIENT
Start: 2022-09-12 | End: 2023-02-22

## 2022-09-12 NOTE — TELEPHONE ENCOUNTER
Last seen: 7/13/22 by Dr. Mars  Next appt: None      Does patient have an active prescription for medications requested? No    Received Request Via: Pharmacy

## 2022-11-12 ENCOUNTER — APPOINTMENT (OUTPATIENT)
Dept: RADIOLOGY | Facility: MEDICAL CENTER | Age: 72
End: 2022-11-12
Attending: EMERGENCY MEDICINE
Payer: MEDICARE

## 2022-11-12 ENCOUNTER — APPOINTMENT (OUTPATIENT)
Dept: RADIOLOGY | Facility: MEDICAL CENTER | Age: 72
End: 2022-11-12
Attending: NURSE PRACTITIONER
Payer: MEDICARE

## 2022-11-12 ENCOUNTER — HOSPITAL ENCOUNTER (OUTPATIENT)
Facility: MEDICAL CENTER | Age: 72
End: 2022-11-13
Attending: EMERGENCY MEDICINE | Admitting: INTERNAL MEDICINE
Payer: MEDICARE

## 2022-11-12 DIAGNOSIS — M54.2 NECK PAIN: ICD-10-CM

## 2022-11-12 DIAGNOSIS — I10 HYPERTENSION, UNSPECIFIED TYPE: ICD-10-CM

## 2022-11-12 DIAGNOSIS — R53.1 GENERALIZED WEAKNESS: ICD-10-CM

## 2022-11-12 DIAGNOSIS — M54.9 PAIN, UPPER BACK: ICD-10-CM

## 2022-11-12 DIAGNOSIS — I20.89 ANGINAL EQUIVALENT (HCC): ICD-10-CM

## 2022-11-12 DIAGNOSIS — G95.20 CERVICAL SPINAL CORD COMPRESSION (HCC): ICD-10-CM

## 2022-11-12 PROBLEM — R07.9 CHEST PAIN: Status: ACTIVE | Noted: 2022-11-12

## 2022-11-12 LAB
ALBUMIN SERPL BCP-MCNC: 4.5 G/DL (ref 3.2–4.9)
ALBUMIN/GLOB SERPL: 1.7 G/DL
ALP SERPL-CCNC: 73 U/L (ref 30–99)
ALT SERPL-CCNC: 23 U/L (ref 2–50)
AMPHET UR QL SCN: NEGATIVE
ANION GAP SERPL CALC-SCNC: 12 MMOL/L (ref 7–16)
AST SERPL-CCNC: 19 U/L (ref 12–45)
BARBITURATES UR QL SCN: NEGATIVE
BASOPHILS # BLD AUTO: 1.4 % (ref 0–1.8)
BASOPHILS # BLD: 0.11 K/UL (ref 0–0.12)
BENZODIAZ UR QL SCN: NEGATIVE
BILIRUB SERPL-MCNC: 0.8 MG/DL (ref 0.1–1.5)
BUN SERPL-MCNC: 15 MG/DL (ref 8–22)
BZE UR QL SCN: NEGATIVE
CALCIUM SERPL-MCNC: 8.8 MG/DL (ref 8.5–10.5)
CANNABINOIDS UR QL SCN: POSITIVE
CHLORIDE SERPL-SCNC: 97 MMOL/L (ref 96–112)
CO2 SERPL-SCNC: 24 MMOL/L (ref 20–33)
CREAT SERPL-MCNC: 0.77 MG/DL (ref 0.5–1.4)
D DIMER PPP IA.FEU-MCNC: 0.27 UG/ML (FEU) (ref 0–0.5)
EKG IMPRESSION: NORMAL
EOSINOPHIL # BLD AUTO: 0.05 K/UL (ref 0–0.51)
EOSINOPHIL NFR BLD: 0.6 % (ref 0–6.9)
ERYTHROCYTE [DISTWIDTH] IN BLOOD BY AUTOMATED COUNT: 39.2 FL (ref 35.9–50)
EST. AVERAGE GLUCOSE BLD GHB EST-MCNC: 117 MG/DL
ETHANOL BLD-MCNC: <10.1 MG/DL
GFR SERPLBLD CREATININE-BSD FMLA CKD-EPI: 94 ML/MIN/1.73 M 2
GLOBULIN SER CALC-MCNC: 2.6 G/DL (ref 1.9–3.5)
GLUCOSE SERPL-MCNC: 148 MG/DL (ref 65–99)
HBA1C MFR BLD: 5.7 % (ref 4–5.6)
HCT VFR BLD AUTO: 44.7 % (ref 42–52)
HGB BLD-MCNC: 15.4 G/DL (ref 14–18)
IMM GRANULOCYTES # BLD AUTO: 0.05 K/UL (ref 0–0.11)
IMM GRANULOCYTES NFR BLD AUTO: 0.6 % (ref 0–0.9)
LYMPHOCYTES # BLD AUTO: 1.3 K/UL (ref 1–4.8)
LYMPHOCYTES NFR BLD: 16.3 % (ref 22–41)
MAGNESIUM SERPL-MCNC: 1.9 MG/DL (ref 1.5–2.5)
MCH RBC QN AUTO: 29.8 PG (ref 27–33)
MCHC RBC AUTO-ENTMCNC: 34.5 G/DL (ref 33.7–35.3)
MCV RBC AUTO: 86.5 FL (ref 81.4–97.8)
METHADONE UR QL SCN: NEGATIVE
MONOCYTES # BLD AUTO: 0.58 K/UL (ref 0–0.85)
MONOCYTES NFR BLD AUTO: 7.3 % (ref 0–13.4)
NEUTROPHILS # BLD AUTO: 5.87 K/UL (ref 1.82–7.42)
NEUTROPHILS NFR BLD: 73.8 % (ref 44–72)
NRBC # BLD AUTO: 0 K/UL
NRBC BLD-RTO: 0 /100 WBC
OPIATES UR QL SCN: POSITIVE
OXYCODONE UR QL SCN: NEGATIVE
PCP UR QL SCN: NEGATIVE
PLATELET # BLD AUTO: 232 K/UL (ref 164–446)
PMV BLD AUTO: 8.9 FL (ref 9–12.9)
POTASSIUM SERPL-SCNC: 4.3 MMOL/L (ref 3.6–5.5)
PROPOXYPH UR QL SCN: NEGATIVE
PROT SERPL-MCNC: 7.1 G/DL (ref 6–8.2)
RBC # BLD AUTO: 5.17 M/UL (ref 4.7–6.1)
SODIUM SERPL-SCNC: 133 MMOL/L (ref 135–145)
TROPONIN T SERPL-MCNC: 6 NG/L (ref 6–19)
TROPONIN T SERPL-MCNC: 7 NG/L (ref 6–19)
TROPONIN T SERPL-MCNC: 7 NG/L (ref 6–19)
TSH SERPL DL<=0.005 MIU/L-ACNC: 5.26 UIU/ML (ref 0.38–5.33)
VIT B12 SERPL-MCNC: 458 PG/ML (ref 211–911)
WBC # BLD AUTO: 8 K/UL (ref 4.8–10.8)

## 2022-11-12 PROCEDURE — 80307 DRUG TEST PRSMV CHEM ANLYZR: CPT

## 2022-11-12 PROCEDURE — G0378 HOSPITAL OBSERVATION PER HR: HCPCS

## 2022-11-12 PROCEDURE — 700101 HCHG RX REV CODE 250: Performed by: EMERGENCY MEDICINE

## 2022-11-12 PROCEDURE — 93005 ELECTROCARDIOGRAM TRACING: CPT | Performed by: NURSE PRACTITIONER

## 2022-11-12 PROCEDURE — A9270 NON-COVERED ITEM OR SERVICE: HCPCS | Performed by: NURSE PRACTITIONER

## 2022-11-12 PROCEDURE — 99220 PR INITIAL OBSERVATION CARE,LEVL III: CPT | Performed by: NURSE PRACTITIONER

## 2022-11-12 PROCEDURE — 96375 TX/PRO/DX INJ NEW DRUG ADDON: CPT

## 2022-11-12 PROCEDURE — 74175 CTA ABDOMEN W/CONTRAST: CPT

## 2022-11-12 PROCEDURE — 700117 HCHG RX CONTRAST REV CODE 255: Performed by: EMERGENCY MEDICINE

## 2022-11-12 PROCEDURE — 85025 COMPLETE CBC W/AUTO DIFF WBC: CPT

## 2022-11-12 PROCEDURE — 700111 HCHG RX REV CODE 636 W/ 250 OVERRIDE (IP): Performed by: EMERGENCY MEDICINE

## 2022-11-12 PROCEDURE — 36415 COLL VENOUS BLD VENIPUNCTURE: CPT

## 2022-11-12 PROCEDURE — 72141 MRI NECK SPINE W/O DYE: CPT

## 2022-11-12 PROCEDURE — 82607 VITAMIN B-12: CPT

## 2022-11-12 PROCEDURE — 80053 COMPREHEN METABOLIC PANEL: CPT

## 2022-11-12 PROCEDURE — 82077 ASSAY SPEC XCP UR&BREATH IA: CPT

## 2022-11-12 PROCEDURE — 84443 ASSAY THYROID STIM HORMONE: CPT

## 2022-11-12 PROCEDURE — 83735 ASSAY OF MAGNESIUM: CPT

## 2022-11-12 PROCEDURE — 85379 FIBRIN DEGRADATION QUANT: CPT

## 2022-11-12 PROCEDURE — 700111 HCHG RX REV CODE 636 W/ 250 OVERRIDE (IP): Performed by: NURSE PRACTITIONER

## 2022-11-12 PROCEDURE — 96374 THER/PROPH/DIAG INJ IV PUSH: CPT

## 2022-11-12 PROCEDURE — 93005 ELECTROCARDIOGRAM TRACING: CPT

## 2022-11-12 PROCEDURE — 700102 HCHG RX REV CODE 250 W/ 637 OVERRIDE(OP): Performed by: NURSE PRACTITIONER

## 2022-11-12 PROCEDURE — 71045 X-RAY EXAM CHEST 1 VIEW: CPT

## 2022-11-12 PROCEDURE — 99285 EMERGENCY DEPT VISIT HI MDM: CPT

## 2022-11-12 PROCEDURE — 84484 ASSAY OF TROPONIN QUANT: CPT

## 2022-11-12 PROCEDURE — 83036 HEMOGLOBIN GLYCOSYLATED A1C: CPT

## 2022-11-12 PROCEDURE — 93005 ELECTROCARDIOGRAM TRACING: CPT | Performed by: EMERGENCY MEDICINE

## 2022-11-12 RX ORDER — MORPHINE SULFATE 4 MG/ML
2 INJECTION INTRAVENOUS
Status: DISCONTINUED | OUTPATIENT
Start: 2022-11-12 | End: 2022-11-13 | Stop reason: HOSPADM

## 2022-11-12 RX ORDER — ASPIRIN 81 MG/1
324 TABLET, CHEWABLE ORAL DAILY
Status: DISCONTINUED | OUTPATIENT
Start: 2022-11-12 | End: 2022-11-13 | Stop reason: HOSPADM

## 2022-11-12 RX ORDER — HYDRALAZINE HYDROCHLORIDE 20 MG/ML
10 INJECTION INTRAMUSCULAR; INTRAVENOUS EVERY 4 HOURS PRN
Status: DISCONTINUED | OUTPATIENT
Start: 2022-11-12 | End: 2022-11-13 | Stop reason: HOSPADM

## 2022-11-12 RX ORDER — ASPIRIN 300 MG/1
300 SUPPOSITORY RECTAL DAILY
Status: DISCONTINUED | OUTPATIENT
Start: 2022-11-12 | End: 2022-11-13 | Stop reason: HOSPADM

## 2022-11-12 RX ORDER — ACETAMINOPHEN 325 MG/1
650 TABLET ORAL EVERY 6 HOURS PRN
Status: DISCONTINUED | OUTPATIENT
Start: 2022-11-12 | End: 2022-11-12

## 2022-11-12 RX ORDER — LEVOTHYROXINE SODIUM 0.12 MG/1
125 TABLET ORAL
Status: DISCONTINUED | OUTPATIENT
Start: 2022-11-12 | End: 2022-11-13 | Stop reason: HOSPADM

## 2022-11-12 RX ORDER — REGADENOSON 0.08 MG/ML
0.4 INJECTION, SOLUTION INTRAVENOUS
Status: COMPLETED | OUTPATIENT
Start: 2022-11-12 | End: 2022-11-13

## 2022-11-12 RX ORDER — LABETALOL HYDROCHLORIDE 5 MG/ML
10 INJECTION, SOLUTION INTRAVENOUS ONCE
Status: COMPLETED | OUTPATIENT
Start: 2022-11-12 | End: 2022-11-12

## 2022-11-12 RX ORDER — AMOXICILLIN 250 MG
2 CAPSULE ORAL 2 TIMES DAILY
Status: DISCONTINUED | OUTPATIENT
Start: 2022-11-12 | End: 2022-11-13 | Stop reason: HOSPADM

## 2022-11-12 RX ORDER — ROSUVASTATIN CALCIUM 20 MG/1
20 TABLET, COATED ORAL EVERY EVENING
Status: DISCONTINUED | OUTPATIENT
Start: 2022-11-12 | End: 2022-11-13 | Stop reason: HOSPADM

## 2022-11-12 RX ORDER — AMLODIPINE BESYLATE 5 MG/1
5 TABLET ORAL DAILY
Status: DISCONTINUED | OUTPATIENT
Start: 2022-11-13 | End: 2022-11-13 | Stop reason: HOSPADM

## 2022-11-12 RX ORDER — ACETAMINOPHEN 500 MG
1000 TABLET ORAL EVERY 6 HOURS
Status: DISCONTINUED | OUTPATIENT
Start: 2022-11-12 | End: 2022-11-13 | Stop reason: HOSPADM

## 2022-11-12 RX ORDER — OXYCODONE HYDROCHLORIDE 5 MG/1
2.5 TABLET ORAL
Status: DISCONTINUED | OUTPATIENT
Start: 2022-11-12 | End: 2022-11-13 | Stop reason: HOSPADM

## 2022-11-12 RX ORDER — ASPIRIN 325 MG
325 TABLET ORAL DAILY
Status: DISCONTINUED | OUTPATIENT
Start: 2022-11-12 | End: 2022-11-13 | Stop reason: HOSPADM

## 2022-11-12 RX ORDER — LOSARTAN POTASSIUM 50 MG/1
100 TABLET ORAL DAILY
Status: DISCONTINUED | OUTPATIENT
Start: 2022-11-13 | End: 2022-11-13 | Stop reason: HOSPADM

## 2022-11-12 RX ORDER — ONDANSETRON 2 MG/ML
4 INJECTION INTRAMUSCULAR; INTRAVENOUS EVERY 4 HOURS PRN
Status: DISCONTINUED | OUTPATIENT
Start: 2022-11-12 | End: 2022-11-13 | Stop reason: HOSPADM

## 2022-11-12 RX ORDER — POLYETHYLENE GLYCOL 3350 17 G/17G
1 POWDER, FOR SOLUTION ORAL
Status: DISCONTINUED | OUTPATIENT
Start: 2022-11-12 | End: 2022-11-13 | Stop reason: HOSPADM

## 2022-11-12 RX ORDER — OXYCODONE HYDROCHLORIDE 5 MG/1
5 TABLET ORAL
Status: DISCONTINUED | OUTPATIENT
Start: 2022-11-12 | End: 2022-11-13 | Stop reason: HOSPADM

## 2022-11-12 RX ORDER — AMINOPHYLLINE 25 MG/ML
100 INJECTION, SOLUTION INTRAVENOUS
Status: DISCONTINUED | OUTPATIENT
Start: 2022-11-12 | End: 2022-11-13 | Stop reason: HOSPADM

## 2022-11-12 RX ORDER — IBUPROFEN 200 MG
400 TABLET ORAL EVERY 6 HOURS PRN
Status: SHIPPED | COMMUNITY
End: 2023-10-10

## 2022-11-12 RX ORDER — MORPHINE SULFATE 4 MG/ML
4 INJECTION INTRAVENOUS ONCE
Status: COMPLETED | OUTPATIENT
Start: 2022-11-12 | End: 2022-11-12

## 2022-11-12 RX ORDER — ACETAMINOPHEN 500 MG
1000 TABLET ORAL EVERY 6 HOURS PRN
Status: DISCONTINUED | OUTPATIENT
Start: 2022-11-17 | End: 2022-11-13 | Stop reason: HOSPADM

## 2022-11-12 RX ORDER — IBUPROFEN 800 MG/1
800 TABLET ORAL 3 TIMES DAILY PRN
Status: DISCONTINUED | OUTPATIENT
Start: 2022-11-15 | End: 2022-11-13 | Stop reason: HOSPADM

## 2022-11-12 RX ORDER — BISACODYL 10 MG
10 SUPPOSITORY, RECTAL RECTAL
Status: DISCONTINUED | OUTPATIENT
Start: 2022-11-12 | End: 2022-11-13 | Stop reason: HOSPADM

## 2022-11-12 RX ORDER — ONDANSETRON 2 MG/ML
4 INJECTION INTRAMUSCULAR; INTRAVENOUS ONCE
Status: COMPLETED | OUTPATIENT
Start: 2022-11-12 | End: 2022-11-12

## 2022-11-12 RX ORDER — KETOROLAC TROMETHAMINE 30 MG/ML
15 INJECTION, SOLUTION INTRAMUSCULAR; INTRAVENOUS EVERY 6 HOURS
Status: DISCONTINUED | OUTPATIENT
Start: 2022-11-12 | End: 2022-11-13 | Stop reason: HOSPADM

## 2022-11-12 RX ORDER — CARVEDILOL 12.5 MG/1
12.5 TABLET ORAL 2 TIMES DAILY WITH MEALS
Status: DISCONTINUED | OUTPATIENT
Start: 2022-11-12 | End: 2022-11-13 | Stop reason: HOSPADM

## 2022-11-12 RX ORDER — OMEPRAZOLE 20 MG/1
40 CAPSULE, DELAYED RELEASE ORAL DAILY
Status: DISCONTINUED | OUTPATIENT
Start: 2022-11-13 | End: 2022-11-13 | Stop reason: HOSPADM

## 2022-11-12 RX ORDER — ONDANSETRON 4 MG/1
4 TABLET, ORALLY DISINTEGRATING ORAL EVERY 4 HOURS PRN
Status: DISCONTINUED | OUTPATIENT
Start: 2022-11-12 | End: 2022-11-13 | Stop reason: HOSPADM

## 2022-11-12 RX ADMIN — KETOROLAC TROMETHAMINE 15 MG: 30 INJECTION, SOLUTION INTRAMUSCULAR at 17:56

## 2022-11-12 RX ADMIN — LABETALOL HYDROCHLORIDE 10 MG: 5 INJECTION, SOLUTION INTRAVENOUS at 11:21

## 2022-11-12 RX ADMIN — IOHEXOL 93 ML: 350 INJECTION, SOLUTION INTRAVENOUS at 11:11

## 2022-11-12 RX ADMIN — OXYCODONE 5 MG: 5 TABLET ORAL at 16:34

## 2022-11-12 RX ADMIN — ACETAMINOPHEN 1000 MG: 500 TABLET ORAL at 17:57

## 2022-11-12 RX ADMIN — ONDANSETRON 4 MG: 2 INJECTION INTRAMUSCULAR; INTRAVENOUS at 11:22

## 2022-11-12 RX ADMIN — MORPHINE SULFATE 4 MG: 4 INJECTION INTRAVENOUS at 11:22

## 2022-11-12 RX ADMIN — LIDOCAINE HYDROCHLORIDE 30 ML: 20 SOLUTION OROPHARYNGEAL at 18:36

## 2022-11-12 RX ADMIN — CARVEDILOL 12.5 MG: 12.5 TABLET, FILM COATED ORAL at 18:00

## 2022-11-12 RX ADMIN — ROSUVASTATIN CALCIUM 20 MG: 20 TABLET, FILM COATED ORAL at 17:57

## 2022-11-12 ASSESSMENT — LIFESTYLE VARIABLES
EVER FELT BAD OR GUILTY ABOUT YOUR DRINKING: NO
EVER HAD A DRINK FIRST THING IN THE MORNING TO STEADY YOUR NERVES TO GET RID OF A HANGOVER: NO
HAVE PEOPLE ANNOYED YOU BY CRITICIZING YOUR DRINKING: NO
HOW MANY TIMES IN THE PAST YEAR HAVE YOU HAD 5 OR MORE DRINKS IN A DAY: 50
TOTAL SCORE: 1
ON A TYPICAL DAY WHEN YOU DRINK ALCOHOL HOW MANY DRINKS DO YOU HAVE: 6
ALCOHOL_USE: YES
AVERAGE NUMBER OF DAYS PER WEEK YOU HAVE A DRINK CONTAINING ALCOHOL: 7
DOES PATIENT WANT TO STOP DRINKING: NO
CONSUMPTION TOTAL: POSITIVE
TOTAL SCORE: 1
TOTAL SCORE: 1
HAVE YOU EVER FELT YOU SHOULD CUT DOWN ON YOUR DRINKING: YES

## 2022-11-12 ASSESSMENT — ENCOUNTER SYMPTOMS
GASTROINTESTINAL NEGATIVE: 1
WEAKNESS: 1
BACK PAIN: 1
MYALGIAS: 1
CHILLS: 0
RESPIRATORY NEGATIVE: 1
CARDIOVASCULAR NEGATIVE: 1
PSYCHIATRIC NEGATIVE: 1
EYES NEGATIVE: 1
FEVER: 0

## 2022-11-12 ASSESSMENT — HEART SCORE
ECG: NORMAL
HISTORY: HIGHLY SUSPICIOUS
HEART SCORE: 6
TROPONIN: LESS THAN OR EQUAL TO NORMAL LIMIT
AGE: 65+
RISK FACTORS: >2 RISK FACTORS OR HX OF ATHEROSCLEROTIC DISEASE

## 2022-11-12 ASSESSMENT — FIBROSIS 4 INDEX
FIB4 SCORE: 1.25
FIB4 SCORE: 1.26

## 2022-11-12 ASSESSMENT — PAIN DESCRIPTION - PAIN TYPE
TYPE: ACUTE PAIN
TYPE: ACUTE PAIN

## 2022-11-12 ASSESSMENT — PAIN DESCRIPTION - DESCRIPTORS: DESCRIPTORS: ACHING

## 2022-11-12 ASSESSMENT — PATIENT HEALTH QUESTIONNAIRE - PHQ9
1. LITTLE INTEREST OR PLEASURE IN DOING THINGS: NOT AT ALL
SUM OF ALL RESPONSES TO PHQ9 QUESTIONS 1 AND 2: 0
2. FEELING DOWN, DEPRESSED, IRRITABLE, OR HOPELESS: NOT AT ALL

## 2022-11-12 NOTE — ED PROVIDER NOTES
"ED Provider Note    CHIEF COMPLAINT  Chief Complaint   Patient presents with    Neck Pain     VIA EMS AIR Left side neck w/ increased weakness. Bilateral N/T to UE & backp pain with pain across shoulders.  Started @ 0600 when he woke.  Went out hunting began having increased weakness.  .  Fire reports BP over 200 systolic.  Received 324 mg aspirin, nitro and fentanyl in route.        HPI  Rodney Arias is a 72 y.o. male who presents with pain across his back at scapula level, into his neck.  This was associated with generalized weakness.  He states he had difficulty walking out from his camp which is very unusual for him.  He awoke this morning with the back discomfort while camping, felt like he had \"slept wrong\".  Patient states heart attack in the past with subsequent stent placement in 2011, symptoms were similar at that time.  He denies chest pain, states he had no chest pain during his MI.  Pain going into his neck is a new symptom.  No acute numbness or weakness to the extremities.  He denies headache.  No difficulty breathing.  No nausea other than during his helicopter transfer.  Nursing staff informed me differential blood pressure right arm 140 systolic, left arm 180 systolic.  Paramedics found the patient to be hypertensive on scene.  Patient describes his pain is moderate, requesting medication for the pain.  He states he is compliant with his medications.  No trauma.  No abdominal pain or low back pain    REVIEW OF SYSTEMS    Constitutional: Generalized weakness, no fever  Respiratory: No respiratory distress, no pleurisy  Cardiac: Back and neck pain, \"feels similar to my heart attack\"  Gastrointestinal: No abdominal pain, no nausea or vomiting  Musculoskeletal: Back and neck pain  Neurologic: No headache, no acute numbness or weakness  Skin: No swelling     All other systems are negative.       PAST MEDICAL HISTORY  Past Medical History:   Diagnosis Date    CAD (coronary artery disease) " "10/21/2011    Dyslipidemia 10/21/2011    HTN (hypertension) 10/21/2011    MI (myocardial infarction) (HCC) 10/21/2011    Myocardial infarction (HCC)     with stent placement    Stented coronary artery 10/21/2011       FAMILY HISTORY  Family History   Problem Relation Age of Onset    Heart Disease Neg Hx         no premature CAD       SOCIAL HISTORY  Social History     Socioeconomic History    Marital status:    Tobacco Use    Smoking status: Never    Smokeless tobacco: Never   Vaping Use    Vaping Use: Never used   Substance and Sexual Activity    Alcohol use: Yes     Alcohol/week: 12.6 oz     Types: 21 Cans of beer per week     Comment: 3 beers a day    Drug use: No       SURGICAL HISTORY  Past Surgical History:   Procedure Laterality Date    ANGIOPLASTY      ZZZ CARDIAC CATH         CURRENT MEDICATIONS  Home Medications    **Home medications have not yet been reviewed for this encounter**         ALLERGIES  Allergies   Allergen Reactions    Nkda [No Known Drug Allergy]        PHYSICAL EXAM  VITAL SIGNS: BP (!) 180/91   Pulse 81   Temp 36.9 °C (98.5 °F) (Temporal)   Resp 18   Ht 1.803 m (5' 11\")   Wt 97.5 kg (215 lb)   SpO2 92%   BMI 29.99 kg/m²   Constitutional:  Non-toxic appearance.   HENT: No facial swelling  Eyes: Anicteric, no conjunctivitis.     Cardiovascular: Normal heart rate, Normal rhythm, no heart murmur  Pulmonary:  No wheezing, No rales.   Gastrointestinal: Soft, No tenderness, No distention, no pulsatile mass  Skin: Warm, Dry, No cyanosis.  No edema  Neurologic: Speech is clear, follows commands, facial expression is symmetrical.  Strength and sensation intact  Psychiatric: Affect normal,  Mood normal.  Patient is calm and cooperative  Musculoskeletal: No chest wall tenderness, neck is nontender.    EKG/Labs  Results for orders placed or performed during the hospital encounter of 11/12/22   CBC with Differential   Result Value Ref Range    WBC 8.0 4.8 - 10.8 K/uL    RBC 5.17 4.70 - " 6.10 M/uL    Hemoglobin 15.4 14.0 - 18.0 g/dL    Hematocrit 44.7 42.0 - 52.0 %    MCV 86.5 81.4 - 97.8 fL    MCH 29.8 27.0 - 33.0 pg    MCHC 34.5 33.7 - 35.3 g/dL    RDW 39.2 35.9 - 50.0 fL    Platelet Count 232 164 - 446 K/uL    MPV 8.9 (L) 9.0 - 12.9 fL    Neutrophils-Polys 73.80 (H) 44.00 - 72.00 %    Lymphocytes 16.30 (L) 22.00 - 41.00 %    Monocytes 7.30 0.00 - 13.40 %    Eosinophils 0.60 0.00 - 6.90 %    Basophils 1.40 0.00 - 1.80 %    Immature Granulocytes 0.60 0.00 - 0.90 %    Nucleated RBC 0.00 /100 WBC    Neutrophils (Absolute) 5.87 1.82 - 7.42 K/uL    Lymphs (Absolute) 1.30 1.00 - 4.80 K/uL    Monos (Absolute) 0.58 0.00 - 0.85 K/uL    Eos (Absolute) 0.05 0.00 - 0.51 K/uL    Baso (Absolute) 0.11 0.00 - 0.12 K/uL    Immature Granulocytes (abs) 0.05 0.00 - 0.11 K/uL    NRBC (Absolute) 0.00 K/uL   Complete Metabolic Panel (CMP)   Result Value Ref Range    Sodium 133 (L) 135 - 145 mmol/L    Potassium 4.3 3.6 - 5.5 mmol/L    Chloride 97 96 - 112 mmol/L    Co2 24 20 - 33 mmol/L    Anion Gap 12.0 7.0 - 16.0    Glucose 148 (H) 65 - 99 mg/dL    Bun 15 8 - 22 mg/dL    Creatinine 0.77 0.50 - 1.40 mg/dL    Calcium 8.8 8.5 - 10.5 mg/dL    AST(SGOT) 19 12 - 45 U/L    ALT(SGPT) 23 2 - 50 U/L    Alkaline Phosphatase 73 30 - 99 U/L    Total Bilirubin 0.8 0.1 - 1.5 mg/dL    Albumin 4.5 3.2 - 4.9 g/dL    Total Protein 7.1 6.0 - 8.2 g/dL    Globulin 2.6 1.9 - 3.5 g/dL    A-G Ratio 1.7 g/dL   Troponins NOW   Result Value Ref Range    Troponin T 7 6 - 19 ng/L   Troponins in two (2) hours   Result Value Ref Range    Troponin T 6 6 - 19 ng/L   ESTIMATED GFR   Result Value Ref Range    GFR (CKD-EPI) 94 >60 mL/min/1.73 m 2   D-Dimer   Result Value Ref Range    D-Dimer Screen 0.27 0.00 - 0.50 ug/mL (FEU)   Urine Drug Screen   Result Value Ref Range    Amphetamines Urine Negative Negative    Barbiturates Negative Negative    Benzodiazepines Negative Negative    Cocaine Metabolite Negative Negative    Methadone Negative Negative     Opiates Positive (A) Negative    Oxycodone Negative Negative    Phencyclidine -Pcp Negative Negative    Propoxyphene Negative Negative    Cannabinoid Metab Positive (A) Negative   HEMOGLOBIN A1C   Result Value Ref Range    Glycohemoglobin 5.7 (H) 4.0 - 5.6 %    Est Avg Glucose 117 mg/dL   MAGNESIUM   Result Value Ref Range    Magnesium 1.9 1.5 - 2.5 mg/dL   DIAGNOSTIC ALCOHOL   Result Value Ref Range    Diagnostic Alcohol <10.1 <10.1 mg/dL   TSH   Result Value Ref Range    TSH 5.260 0.380 - 5.330 uIU/mL   VITAMIN B12   Result Value Ref Range    Vitamin B12 -True Cobalamin 458 211 - 911 pg/mL   TROPONIN   Result Value Ref Range    Troponin T 7 6 - 19 ng/L   EKG   Result Value Ref Range    Report       [facility] Emergency Dept.    Test Date:  2022  Pt Name:    RANDY OSEGUERA                Department: ER  MRN:        [id number]                      Room:       [facility]  Gender:     Male                         Technician: [id number]  :        [id number]                   Requested By:ER TRIAGE PROTOCOL  Order #:    [id number]                    Reading MD: ARTURO KUMAR MD    Measurements  Intervals                                Axis  Rate:       80                           P:          31  VT:         183                          QRS:        -78  QRSD:       102                          T:          58  QT:         411  QTc:        475    Interpretive Statements  Sinus rhythm  Compared to ECG 2010 05:15:19  Left anterior fascicular block no longer present  Incomplete right bundle-branch block no longer present  Right bundle-branch block no longer present  Electronically Signed On 2022 13:07:07 PST by ARTURO KUMAR MD       RADIOLOGY/PROCEDURES  MR-CERVICAL SPINE-W/O   Final Result      1.  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 This would be an unusual appearance for hematoma or  epidural abscess though these are considerations. A    meningioma, neurofibroma, schwannoma or other mass could also have this appearance. Recommend further assessment with contrast-enhanced MRI.   2.  Multilevel multifactorial degenerative changes   3.  Areas of central canal and neural foraminal narrowing as described above      Findings were communicated with and acknowledged by JAX GALLEGOS via [employer] on 11/12/2022 6:15 PM.      DX-CHEST-PORTABLE (1 VIEW)   Final Result      1.  There is no acute cardiopulmonary process.      CT-CTA COMPLETE THORACOABDOMINAL AORTA   Final Result      1.  No aortic aneurysm, stenosis or dissection identified.      2.  Mild left heart enlargement with severe coronary atherosclerosis and mild ascending aortic dilatation at 4.0 cm      3.  Urinary bladder distended to the level of the umbilicus with moderate prostate enlargement that could indicate mild chronic outlet obstruction      4.  Left upper lobe noncalcified nodule measuring just under 4 mm. Follow-up recommendations as below      Low Risk: No routine follow-up      High Risk: Optional CT at 12 months      Comments: Nodules less than 6 mm do not require routine follow-up, but certain patients at high risk with suspicious nodule morphology, upper lobe location, or both may warrant 12-month follow-up.      Low Risk - Minimal or absent history of smoking and of other known risk factors.      High Risk - History of smoking or of other known risk factors.      Note: These recommendations do not apply to lung cancer screening, patients with immunosuppression, or patients with known primary cancer.      Fleischner Society 2017 Guidelines for Management of Incidentally Detected Pulmonary Nodules in Adults                  NM-CARDIAC STRESS TEST    (Results Pending)         COURSE & MEDICAL DECISION MAKING  Pertinent Labs & Imaging studies reviewed. (See chart for details)  Patient presented with back and neck  pain, evolving in its location, with differential blood pressures on both arms concerning for aortic dissection.  Patient was made code aorta, CT scan of the aorta was negative for acute findings.  His symptoms appear to represent atypical angina, he states he had similar discomfort at the time of his heart attack 10 years ago.  His troponin is negative, chest x-ray negative.  Patient was given IV labetalol for his blood pressure in the emergency department.  Patient received 4 mg of morphine for pain with good relief.  Patient's heart score is 6, he is at moderate risk for cardiac illness, admitted for ongoing evaluation and treatment.    FINAL IMPRESSION     1. Pain, upper back        2. Anginal equivalent (HCC)        3. Neck pain        4. Generalized weakness        5. Hypertension, unspecified type            Critical care time 35 minutes              Electronically signed by: Karl Curtis M.D., 11/12/2022 11:05 AM

## 2022-11-12 NOTE — ASSESSMENT & PLAN NOTE
- Patient counseled  -Obtain blood alcohol level  -Claims to drink 6-7 beers every night  -Monitor for any signs of CIWA  -Currently score of 0 on admission

## 2022-11-12 NOTE — ED NOTES
Med rec completed per patient and spouse at bedside with own home medication list (reviewed and returned).  Allergies reviewed with patient. NKDA.  No outpatient antibiotics in the last 30 days.  [facility]'s [facility]

## 2022-11-12 NOTE — H&P
Hospital Medicine History & Physical Note    Date of Service  11/12/2022    Primary Care Physician  Sultan KEARA Mars M.D.    Consultants  NONE    Specialist Names: N/A    Code Status  Full Code    Chief Complaint  Chief Complaint   Patient presents with    Neck Pain     VIA EMS AIR Left side neck w/ increased weakness. Bilateral N/T to UE & backp pain with pain across shoulders.  Started @ 0600 when he woke.  Went out hunting began having increased weakness.  .  Fire reports BP over 200 systolic.  Received 324 mg aspirin, nitro and fentanyl in route.        History of Presenting Illness  Mr. Rodney Arias is a 72 y.o. male with a PMHx of an MI 12 years ago, HTN, HLD, hypothyroidism, who presented 11/12/2022 with severe back pain radiating to his neck.    Patient out hunting and woke up with a scapular pain radiating to his neck associated with generalized weakness.  Patient reports having an MI in 2005 with 1 stent and symptoms were very similar. When he woke up, he thought that he had just slept wrong.  All of his symptoms were very similar from when he had a heart attack in 2005.  Patient denies any chest pain, but also reports that he did not have any chest pain when he had his last MI.  His scapular pain was radiating to his neck which is new.  Patient also felt generalized weakness to where he was unable to walk which prompted him to sit down and eventually laid down.  Patient denies any numbness,  no headache, no shortness of breath, no nausea, no vomiting.  EMS was called and patient was transferred to our ER via air paramedics.  Patient noted to have elevated blood pressure on scene with SBP above 200s.  Patient admits to being under a lot of stress this past 2-3 months.  Patient also endorses that he hurt his right shoulder approximately 3 days ago from lifting something heavy which she has been taking aspirin and ibuprofen for this.  Patient endorses smoking marijuana daily along with drinking 6-7  "beers every night.  Patient's last drink was last night.  Patient denies any tobacco use.  Patient denies any significant family history of cardiac issues, no stroke, no noted cancer in the family.    In ER, patient noted to have a mildly elevated blood pressure at 147/77 with all other vitals within normal limits.  There was however a differential blood pressure of the right arm versus the left arm which noted SBP at 180 from the left arm.  Patient worked up for possible AAA and CTA completed thoracoabdominal aorta was obtained noting no aortic aneurysm stenosis or dissection identified with mild left heart enlargement with severe coronary atherosclerosis and mild ascending aortic dilation of 4.0 cm.  Notable lab findings noted sodium at 133, glucose 148.  Initial troponin T at 7.  EKG notes sinus rhythm with heart rate in the 80s.  Initial CXR notes no cardiopulmonary process.  Patient will be admitted into the observation unit for ACS rule out.    On examination, patient denies back pain, but is having some neck tenderness.  Patient also is complaining of \"electrical shock\" feeling radiating down to his arm.  Patient endorses that he still feels somewhat generally weak from the event.  Also noted patient has a very red/pink coloration likely due to to alcohol use.  We will obtain blood alcohol level.  Discussed with patient and spouse regarding plan of care.    Discussed CODE STATUS with patient along with spouse.  At this time, patient would like to be full code.    I ordered a chemical stress test, trend troponin, telemetry monitoring, A1c, lipid panel, TSH, blood alcohol level. Also ordered MRI cervical spine due to c/o of shooting pain sensation on B/L upper extremities.        I discussed the plan of care with patient, family, and bedside RN.    Review of Systems  Review of Systems   Constitutional:  Positive for malaise/fatigue. Negative for chills and fever.   HENT: Negative.     Eyes: Negative.  "   Respiratory: Negative.     Cardiovascular: Negative.    Gastrointestinal: Negative.    Genitourinary: Negative.    Musculoskeletal:  Positive for back pain and myalgias.   Skin: Negative.    Neurological:  Positive for weakness.   Endo/Heme/Allergies: Negative.    Psychiatric/Behavioral: Negative.       Past Medical History   has a past medical history of CAD (coronary artery disease) (10/21/2011), Dyslipidemia (10/21/2011), HTN (hypertension) (10/21/2011), MI (myocardial infarction) (HCC) (10/21/2011), Myocardial infarction (HCC), and Stented coronary artery (10/21/2011).    Surgical History   has a past surgical history that includes angioplasty and zzz cardiac cath.     Family History  family history is not on file.   Family history reviewed with patient. There is no family history that is pertinent to the chief complaint.     Social History   reports that he has never smoked. He has never used smokeless tobacco. He reports current alcohol use of about 12.6 oz per week. He reports that he does not use drugs.    Allergies  Allergies   Allergen Reactions    Nkda [No Known Drug Allergy]        Medications  Prior to Admission Medications   Prescriptions Last Dose Informant Patient Reported? Taking?   Potassium 99 MG TABS   Yes No   Sig: Take 1 Tab by mouth every morning.   amLODIPine (NORVASC) 5 MG Tab   No No   Sig: Take 1 Tablet by mouth every day.   aspirin (ASA) 81 MG Chew Tab chewable tablet   Yes No   Sig: Chew 81 mg every day.   b complex vitamins tablet   Yes No   Sig: B COMPLEX TABS   carvedilol (COREG) 12.5 MG Tab   Yes No   Sig: Take 1.5 Tablets by mouth 2 times a day with meals.   levothyroxine (SYNTHROID) 125 MCG Tab   No No   Sig: Take 1 Tablet by mouth every morning on an empty stomach.   losartan (COZAAR) 100 MG Tab   No No   Sig: TAKE 1 TABLET BY MOUTH EVERY DAY   omeprazole (PRILOSEC) 40 MG delayed-release capsule   No No   Sig: Take 1 Capsule by mouth every day.   rosuvastatin (CRESTOR) 20 MG Tab    No No   Sig: Take 1 Tablet by mouth every evening.      Facility-Administered Medications: None       Physical Exam  Temp:  [36.9 °C (98.5 °F)] 36.9 °C (98.5 °F)  Pulse:  [67-84] 69  Resp:  [13-18] 15  BP: (120-180)/(61-92) 122/61  SpO2:  [91 %-97 %] 94 %  Blood Pressure : 122/61   Temperature: 36.9 °C (98.5 °F)   Pulse: 69   Respiration: 15   Pulse Oximetry: 94 %       Physical Exam  Vitals and nursing note reviewed.   Constitutional:       Appearance: He is obese.   HENT:      Head: Normocephalic.      Nose: Nose normal.      Mouth/Throat:      Mouth: Mucous membranes are moist.      Pharynx: Oropharynx is clear.   Eyes:      Pupils: Pupils are equal, round, and reactive to light.   Cardiovascular:      Rate and Rhythm: Normal rate and regular rhythm.      Pulses: Normal pulses.      Heart sounds: Normal heart sounds.   Pulmonary:      Effort: Pulmonary effort is normal.      Breath sounds: Normal breath sounds.   Abdominal:      General: Bowel sounds are normal.      Palpations: Abdomen is soft.   Musculoskeletal:         General: Tenderness and signs of injury present.      Cervical back: Normal range of motion and neck supple.   Skin:     General: Skin is dry.      Capillary Refill: Capillary refill takes 2 to 3 seconds.   Neurological:      Mental Status: He is alert. Mental status is at baseline.      Motor: Weakness present.       Laboratory:  Recent Labs     11/12/22  1047   WBC 8.0   RBC 5.17   HEMOGLOBIN 15.4   HEMATOCRIT 44.7   MCV 86.5   MCH 29.8   MCHC 34.5   RDW 39.2   PLATELETCT 232   MPV 8.9*     Recent Labs     11/12/22  1047   SODIUM 133*   POTASSIUM 4.3   CHLORIDE 97   CO2 24   GLUCOSE 148*   BUN 15   CREATININE 0.77   CALCIUM 8.8     Recent Labs     11/12/22  1047   ALTSGPT 23   ASTSGOT 19   ALKPHOSPHAT 73   TBILIRUBIN 0.8   GLUCOSE 148*         No results for input(s): NTPROBNP in the last 72 hours.      Recent Labs     11/12/22  1047   TROPONINT 7       Imaging:  DX-CHEST-PORTABLE (1 VIEW)    Final Result      1.  There is no acute cardiopulmonary process.      CT-CTA COMPLETE THORACOABDOMINAL AORTA   Final Result      1.  No aortic aneurysm, stenosis or dissection identified.      2.  Mild left heart enlargement with severe coronary atherosclerosis and mild ascending aortic dilatation at 4.0 cm      3.  Urinary bladder distended to the level of the umbilicus with moderate prostate enlargement that could indicate mild chronic outlet obstruction      4.  Left upper lobe noncalcified nodule measuring just under 4 mm. Follow-up recommendations as below      Low Risk: No routine follow-up      High Risk: Optional CT at 12 months      Comments: Nodules less than 6 mm do not require routine follow-up, but certain patients at high risk with suspicious nodule morphology, upper lobe location, or both may warrant 12-month follow-up.      Low Risk - Minimal or absent history of smoking and of other known risk factors.      High Risk - History of smoking or of other known risk factors.      Note: These recommendations do not apply to lung cancer screening, patients with immunosuppression, or patients with known primary cancer.      Fleischner Society 2017 Guidelines for Management of Incidentally Detected Pulmonary Nodules in Adults                  NM-CARDIAC STRESS TEST    (Results Pending)       X-Ray:  I have personally reviewed the images and compared with prior images.  EKG:  I have personally reviewed the images and compared with prior images.    Assessment/Plan:  Justification for Admission Status  I anticipate this patient is appropriate for observation status at this time because ACS rule out    Patient will need a Telemetry bed on MEDICAL service .  The need is secondary to ACS rule out.    * Chest pain- (present on admission)  Assessment & Plan  The ASCVD Risk score (Sid RÍOS, et al., 2019) failed to calculate for the following reasons:    The patient has a prior MI or stroke diagnosis  -Heart score  6  -History of MI with 1 stent in 2005  -Telemetry monitoring  -Obtain EKG if chest pain increases  -Chemical stress tests  -Trend troponin    Hyponatremia- (present on admission)  Assessment & Plan  - Likely chronic  -Continue to monitor    Hypothyroidism (acquired)- (present on admission)  Assessment & Plan  - Obtain updated level of TSH  -Continue home dose      Alcohol use- (present on admission)  Assessment & Plan  - Patient counseled  -Obtain blood alcohol level  -Claims to drink 6-7 beers every night  -Monitor for any signs of CIWA  -Currently score of 0 on admission    Dyslipidemia- (present on admission)  Assessment & Plan  - Obtain lipid panel in a.m.  -Continue antihyperlipidemics    Essential hypertension- (present on admission)  Assessment & Plan  - History of  -Continue home medication  -Monitor every shift  -As needed ordered    Stented coronary artery- (present on admission)  Assessment & Plan  - Placed in 2005      VTE prophylaxis: Xarelto 10 mg daily as prophylaxis    ================================================================================================================================================================================  Please note that this dictation was created using voice recognition software. I have made every reasonable attempt to correct obvious errors, but there may be errors of grammar and possibly content that I did not discover before finalizing the note.    Electronically signed by:  Dr. STUART Velasquez, DNP, APRN, FNP-C  Hospitalist Services  [facility]  [phone]  [email or web address]  11/12/22                 [id number]

## 2022-11-12 NOTE — ED TRIAGE NOTES
VIA EMS AIR Left side neck w/ increased weakness. Bilateral N/T to UE & backp pain with pain across shoulders.  Started @ 0600 when he woke.  Went out hunting began having increased weakness.  .  Fire reports BP over 200 systolic.  Received 324 mg aspirin, nitro and fentanyl in route.

## 2022-11-12 NOTE — ASSESSMENT & PLAN NOTE
The ASCVD Risk score (Sid RÍOS, et al., 2019) failed to calculate for the following reasons:    The patient has a prior MI or stroke diagnosis  -Heart score 6  -History of MI with 1 stent in 2005  -Telemetry monitoring  -Obtain EKG if chest pain increases  -Chemical stress tests  -Trend troponin

## 2022-11-13 ENCOUNTER — PHARMACY VISIT (OUTPATIENT)
Dept: PHARMACY | Facility: MEDICAL CENTER | Age: 72
End: 2022-11-13
Payer: COMMERCIAL

## 2022-11-13 ENCOUNTER — APPOINTMENT (OUTPATIENT)
Dept: RADIOLOGY | Facility: MEDICAL CENTER | Age: 72
End: 2022-11-13
Attending: NURSE PRACTITIONER
Payer: MEDICARE

## 2022-11-13 VITALS
BODY MASS INDEX: 31.36 KG/M2 | SYSTOLIC BLOOD PRESSURE: 128 MMHG | OXYGEN SATURATION: 94 % | HEART RATE: 70 BPM | HEIGHT: 71 IN | DIASTOLIC BLOOD PRESSURE: 73 MMHG | WEIGHT: 223.99 LBS | TEMPERATURE: 97.6 F | RESPIRATION RATE: 18 BRPM

## 2022-11-13 PROBLEM — G95.20 CERVICAL SPINAL CORD COMPRESSION (HCC): Status: ACTIVE | Noted: 2022-11-13

## 2022-11-13 PROBLEM — R07.9 CHEST PAIN: Status: RESOLVED | Noted: 2022-11-12 | Resolved: 2022-11-13

## 2022-11-13 LAB
ANION GAP SERPL CALC-SCNC: 10 MMOL/L (ref 7–16)
BASOPHILS # BLD AUTO: 1.3 % (ref 0–1.8)
BASOPHILS # BLD: 0.09 K/UL (ref 0–0.12)
BUN SERPL-MCNC: 16 MG/DL (ref 8–22)
CALCIUM SERPL-MCNC: 8.5 MG/DL (ref 8.5–10.5)
CHLORIDE SERPL-SCNC: 95 MMOL/L (ref 96–112)
CHOLEST SERPL-MCNC: 125 MG/DL (ref 100–199)
CO2 SERPL-SCNC: 24 MMOL/L (ref 20–33)
CREAT SERPL-MCNC: 0.82 MG/DL (ref 0.5–1.4)
EKG IMPRESSION: NORMAL
EOSINOPHIL # BLD AUTO: 0.19 K/UL (ref 0–0.51)
EOSINOPHIL NFR BLD: 2.7 % (ref 0–6.9)
ERYTHROCYTE [DISTWIDTH] IN BLOOD BY AUTOMATED COUNT: 40.9 FL (ref 35.9–50)
GFR SERPLBLD CREATININE-BSD FMLA CKD-EPI: 92 ML/MIN/1.73 M 2
GLUCOSE SERPL-MCNC: 105 MG/DL (ref 65–99)
HCT VFR BLD AUTO: 40.1 % (ref 42–52)
HDLC SERPL-MCNC: 46 MG/DL
HGB BLD-MCNC: 13.6 G/DL (ref 14–18)
IMM GRANULOCYTES # BLD AUTO: 0.02 K/UL (ref 0–0.11)
IMM GRANULOCYTES NFR BLD AUTO: 0.3 % (ref 0–0.9)
LDLC SERPL CALC-MCNC: 52 MG/DL
LYMPHOCYTES # BLD AUTO: 1.94 K/UL (ref 1–4.8)
LYMPHOCYTES NFR BLD: 27.8 % (ref 22–41)
MCH RBC QN AUTO: 30.1 PG (ref 27–33)
MCHC RBC AUTO-ENTMCNC: 33.9 G/DL (ref 33.7–35.3)
MCV RBC AUTO: 88.7 FL (ref 81.4–97.8)
MONOCYTES # BLD AUTO: 0.73 K/UL (ref 0–0.85)
MONOCYTES NFR BLD AUTO: 10.5 % (ref 0–13.4)
NEUTROPHILS # BLD AUTO: 4.01 K/UL (ref 1.82–7.42)
NEUTROPHILS NFR BLD: 57.4 % (ref 44–72)
NRBC # BLD AUTO: 0 K/UL
NRBC BLD-RTO: 0 /100 WBC
PLATELET # BLD AUTO: 186 K/UL (ref 164–446)
PMV BLD AUTO: 9.2 FL (ref 9–12.9)
POTASSIUM SERPL-SCNC: 3.7 MMOL/L (ref 3.6–5.5)
RBC # BLD AUTO: 4.52 M/UL (ref 4.7–6.1)
SODIUM SERPL-SCNC: 129 MMOL/L (ref 135–145)
TRIGL SERPL-MCNC: 135 MG/DL (ref 0–149)
WBC # BLD AUTO: 7 K/UL (ref 4.8–10.8)

## 2022-11-13 PROCEDURE — 85025 COMPLETE CBC W/AUTO DIFF WBC: CPT

## 2022-11-13 PROCEDURE — 700102 HCHG RX REV CODE 250 W/ 637 OVERRIDE(OP): Performed by: NURSE PRACTITIONER

## 2022-11-13 PROCEDURE — 700111 HCHG RX REV CODE 636 W/ 250 OVERRIDE (IP)

## 2022-11-13 PROCEDURE — A9270 NON-COVERED ITEM OR SERVICE: HCPCS | Performed by: NURSE PRACTITIONER

## 2022-11-13 PROCEDURE — 99217 PR OBSERVATION CARE DISCHARGE: CPT | Performed by: NURSE PRACTITIONER

## 2022-11-13 PROCEDURE — A9502 TC99M TETROFOSMIN: HCPCS

## 2022-11-13 PROCEDURE — 96376 TX/PRO/DX INJ SAME DRUG ADON: CPT

## 2022-11-13 PROCEDURE — 72142 MRI NECK SPINE W/DYE: CPT

## 2022-11-13 PROCEDURE — 700117 HCHG RX CONTRAST REV CODE 255: Performed by: NURSE PRACTITIONER

## 2022-11-13 PROCEDURE — A9576 INJ PROHANCE MULTIPACK: HCPCS | Performed by: NURSE PRACTITIONER

## 2022-11-13 PROCEDURE — RXMED WILLOW AMBULATORY MEDICATION CHARGE: Performed by: NURSE PRACTITIONER

## 2022-11-13 PROCEDURE — 80061 LIPID PANEL: CPT

## 2022-11-13 PROCEDURE — 700105 HCHG RX REV CODE 258: Performed by: NURSE PRACTITIONER

## 2022-11-13 PROCEDURE — 700111 HCHG RX REV CODE 636 W/ 250 OVERRIDE (IP): Performed by: NURSE PRACTITIONER

## 2022-11-13 PROCEDURE — 93010 ELECTROCARDIOGRAM REPORT: CPT | Performed by: INTERNAL MEDICINE

## 2022-11-13 PROCEDURE — G0378 HOSPITAL OBSERVATION PER HR: HCPCS

## 2022-11-13 PROCEDURE — 80048 BASIC METABOLIC PNL TOTAL CA: CPT

## 2022-11-13 RX ORDER — PREDNISONE 50 MG/1
50 TABLET ORAL DAILY
Qty: 5 TABLET | Refills: 0 | Status: SHIPPED | OUTPATIENT
Start: 2022-11-13 | End: 2022-11-18

## 2022-11-13 RX ORDER — CHOLECALCIFEROL (VITAMIN D3) 125 MCG
5 CAPSULE ORAL ONCE
Status: COMPLETED | OUTPATIENT
Start: 2022-11-13 | End: 2022-11-13

## 2022-11-13 RX ORDER — BACLOFEN 10 MG/1
10 TABLET ORAL 3 TIMES DAILY
Qty: 30 TABLET | Refills: 0 | Status: SHIPPED | OUTPATIENT
Start: 2022-11-13 | End: 2022-11-23

## 2022-11-13 RX ORDER — SODIUM CHLORIDE 9 MG/ML
INJECTION, SOLUTION INTRAVENOUS CONTINUOUS
Status: DISCONTINUED | OUTPATIENT
Start: 2022-11-13 | End: 2022-11-13 | Stop reason: HOSPADM

## 2022-11-13 RX ORDER — REGADENOSON 0.08 MG/ML
INJECTION, SOLUTION INTRAVENOUS
Status: COMPLETED
Start: 2022-11-13 | End: 2022-11-13

## 2022-11-13 RX ADMIN — AMLODIPINE BESYLATE 5 MG: 5 TABLET ORAL at 05:29

## 2022-11-13 RX ADMIN — CARVEDILOL 12.5 MG: 12.5 TABLET, FILM COATED ORAL at 07:25

## 2022-11-13 RX ADMIN — ASPIRIN 325 MG: 325 TABLET ORAL at 05:28

## 2022-11-13 RX ADMIN — OXYCODONE 5 MG: 5 TABLET ORAL at 10:07

## 2022-11-13 RX ADMIN — LOSARTAN POTASSIUM 100 MG: 50 TABLET, FILM COATED ORAL at 05:28

## 2022-11-13 RX ADMIN — ACETAMINOPHEN 1000 MG: 500 TABLET ORAL at 06:17

## 2022-11-13 RX ADMIN — LEVOTHYROXINE SODIUM 125 MCG: 0.12 TABLET ORAL at 05:28

## 2022-11-13 RX ADMIN — REGADENOSON 0.4 MG: 0.08 INJECTION, SOLUTION INTRAVENOUS at 09:13

## 2022-11-13 RX ADMIN — ACETAMINOPHEN 1000 MG: 500 TABLET ORAL at 11:43

## 2022-11-13 RX ADMIN — GADOTERIDOL 20 ML: 279.3 INJECTION, SOLUTION INTRAVENOUS at 10:58

## 2022-11-13 RX ADMIN — SENNOSIDES AND DOCUSATE SODIUM 2 TABLET: 50; 8.6 TABLET ORAL at 05:28

## 2022-11-13 RX ADMIN — KETOROLAC TROMETHAMINE 15 MG: 30 INJECTION, SOLUTION INTRAMUSCULAR at 11:44

## 2022-11-13 RX ADMIN — Medication 5 MG: at 01:11

## 2022-11-13 RX ADMIN — SODIUM CHLORIDE: 9 INJECTION, SOLUTION INTRAVENOUS at 10:12

## 2022-11-13 RX ADMIN — OMEPRAZOLE 40 MG: 20 CAPSULE, DELAYED RELEASE ORAL at 05:28

## 2022-11-13 ASSESSMENT — PAIN DESCRIPTION - PAIN TYPE: TYPE: ACUTE PAIN

## 2022-11-13 ASSESSMENT — COPD QUESTIONNAIRES
HAVE YOU SMOKED AT LEAST 100 CIGARETTES IN YOUR ENTIRE LIFE: NO/DON'T KNOW
DO YOU EVER COUGH UP ANY MUCUS OR PHLEGM?: NO/ONLY WITH OCCASIONAL COLDS OR INFECTIONS
COPD SCREENING SCORE: 2
DURING THE PAST 4 WEEKS HOW MUCH DID YOU FEEL SHORT OF BREATH: NONE/LITTLE OF THE TIME

## 2022-11-13 NOTE — HOSPITAL COURSE
Mr. Rodney Arias is a 72 y.o. male with a PMHx of an MI 12 years ago, HTN, HLD, hypothyroidism, who presented 11/12/2022 with severe back pain radiating to his neck.     Patient out hunting and woke up with a scapular pain radiating to his neck associated with generalized weakness.  Patient reports having an MI in 2005 with 1 stent and symptoms were very similar. When he woke up, he thought that he had just slept wrong.  All of his symptoms were very similar from when he had a heart attack in 2005.  Patient denies any chest pain, but also reports that he did not have any chest pain when he had his last MI.  His scapular pain was radiating to his neck which is new.  Patient also felt generalized weakness to where he was unable to walk which prompted him to sit down and eventually laid down.  Patient denies any numbness,  no headache, no shortness of breath, no nausea, no vomiting.  EMS was called and patient was transferred to our ER via air paramedics.  Patient noted to have elevated blood pressure on scene with SBP above 200s.  Patient admits to being under a lot of stress this past 2-3 months.  Patient also endorses that he hurt his right shoulder approximately 3 days ago from lifting something heavy which she has been taking aspirin and ibuprofen for this.  Patient endorses smoking marijuana daily along with drinking 6-7 beers every night.  Patient's last drink was last night.  Patient denies any tobacco use.  Patient denies any significant family history of cardiac issues, no stroke, no noted cancer in the family.     In ER, patient noted to have a mildly elevated blood pressure at 147/77 with all other vitals within normal limits.  There was however a differential blood pressure of the right arm versus the left arm which noted SBP at 180 from the left arm.  Patient worked up for possible AAA and CTA completed thoracoabdominal aorta was obtained noting no aortic aneurysm stenosis or dissection identified  "with mild left heart enlargement with severe coronary atherosclerosis and mild ascending aortic dilation of 4.0 cm.  Notable lab findings noted sodium at 133, glucose 148.  Initial troponin T at 7.  EKG notes sinus rhythm with heart rate in the 80s.  Initial CXR notes no cardiopulmonary process.  Patient will be admitted into the observation unit for ACS rule out.     On examination, patient denies back pain, but is having some neck tenderness.  Patient also is complaining of \"electrical shock\" feeling radiating down to his arm.  Patient endorses that he still feels somewhat generally weak from the event.  Also noted patient has a very red/pink coloration likely due to to alcohol use.  We will obtain blood alcohol level.  Discussed with patient and spouse regarding plan of care.     Discussed CODE STATUS with patient along with spouse.  At this time, patient would like to be full code.     I ordered a chemical stress test, trend troponin, telemetry monitoring, A1c, lipid panel, TSH, blood alcohol level. Also ordered MRI cervical spine due to c/o of shooting pain sensation on B/L upper extremities.    Patient was monitored overnight with no events noted.  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  We will defer this to PCP to obtain ultrasound on carotid as this will place patient high risk for stroke.  Cardiac stress test was obtained which noted no reversible ischemia.  Due to MRI results, neurosurgery, Dr. Wright was consulted which she recommended high-dose steroid therapy and follow-up as an outpatient.    Patient seen and examined.  " Discussed imaging results with patient and significant other who is at bedside.  Discussed following up with neurosurgery, Dr. Wright for management of care.  Patient prescribed 5-day course of high-dose prednisone 50 mg daily.  Also prescribe patient some muscle relaxant to help with upper back pain.  Patient will resume all home medications.  Patient also counseled about reducing alcohol intake to only 1-2 beers per day.  Patient also counseled in holding off hunting at this time as he will likely have some neurodeficit be given at any time.  All questions and concerns answered prior to being discharged.  Patient discharged home.

## 2022-11-13 NOTE — ASSESSMENT & PLAN NOTE
MRI cervical with contrast:  1.  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 This is most consistent with a solid mass such as a meningioma, schwannoma or neurofibroma, less likely dural metastasis or other malignant tumor.   It does contribute to central canal narrowing and cord compression particularly at C3-C4, as before.  2.  Findings suspicious for hemodynamically significant stenosis of the RIGHT internal carotid artery. Further assessment could be performed with ultrasound.

## 2022-11-13 NOTE — CARE PLAN
The patient is Stable - Low risk of patient condition declining or worsening    Shift Goals  Clinical Goals: comfort safety  Patient Goals: rest    Progress made toward(s) clinical / shift goals:    Problem: Pain - Standard  Goal: Alleviation of pain or a reduction in pain to the patient’s comfort goal  Outcome: Progressing     Problem: Pain - Standard  Goal: Alleviation of pain or a reduction in pain to the patient’s comfort goal  Outcome: Progressing     Problem: Knowledge Deficit - Standard  Goal: Patient and family/care givers will demonstrate understanding of plan of care, disease process/condition, diagnostic tests and medications  Outcome: Progressing     Problem: Knowledge Deficit - Standard  Goal: Patient and family/care givers will demonstrate understanding of plan of care, disease process/condition, diagnostic tests and medications  Outcome: Progressing       Patient is not progressing towards the following goals:

## 2022-11-13 NOTE — PROGRESS NOTES
Reviewed DC order with pt and wife. DC PIV, meds deliver to bed side. ROSEY Velasquez will follow up with pt's concerns tomorrow. DC home.

## 2022-11-13 NOTE — DISCHARGE SUMMARY
Discharge Summary    CHIEF COMPLAINT ON ADMISSION  Chief Complaint   Patient presents with    Neck Pain     VIA EMS AIR Left side neck w/ increased weakness. Bilateral N/T to UE & backp pain with pain across shoulders.  Started @ 0600 when he woke.  Went out hunting began having increased weakness.  .  Fire reports BP over 200 systolic.  Received 324 mg aspirin, nitro and fentanyl in route.        Reason for Admission  EMS     Admission Date  11/12/2022    CODE STATUS  Full Code    HPI & HOSPITAL COURSE    Mr. Rodney Arias is a 72 y.o. male with a PMHx of an MI 12 years ago, HTN, HLD, hypothyroidism, who presented 11/12/2022 with severe back pain radiating to his neck.     Patient out hunting and woke up with a scapular pain radiating to his neck associated with generalized weakness.  Patient reports having an MI in 2005 with 1 stent and symptoms were very similar. When he woke up, he thought that he had just slept wrong.  All of his symptoms were very similar from when he had a heart attack in 2005.  Patient denies any chest pain, but also reports that he did not have any chest pain when he had his last MI.  His scapular pain was radiating to his neck which is new.  Patient also felt generalized weakness to where he was unable to walk which prompted him to sit down and eventually laid down.  Patient denies any numbness,  no headache, no shortness of breath, no nausea, no vomiting.  EMS was called and patient was transferred to our ER via air paramedics.  Patient noted to have elevated blood pressure on scene with SBP above 200s.  Patient admits to being under a lot of stress this past 2-3 months.  Patient also endorses that he hurt his right shoulder approximately 3 days ago from lifting something heavy which she has been taking aspirin and ibuprofen for this.  Patient endorses smoking marijuana daily along with drinking 6-7 beers every night.  Patient's last drink was last night.  Patient denies any  "tobacco use.  Patient denies any significant family history of cardiac issues, no stroke, no noted cancer in the family.     In ER, patient noted to have a mildly elevated blood pressure at 147/77 with all other vitals within normal limits.  There was however a differential blood pressure of the right arm versus the left arm which noted SBP at 180 from the left arm.  Patient worked up for possible AAA and CTA completed thoracoabdominal aorta was obtained noting no aortic aneurysm stenosis or dissection identified with mild left heart enlargement with severe coronary atherosclerosis and mild ascending aortic dilation of 4.0 cm.  Notable lab findings noted sodium at 133, glucose 148.  Initial troponin T at 7.  EKG notes sinus rhythm with heart rate in the 80s.  Initial CXR notes no cardiopulmonary process.  Patient will be admitted into the observation unit for ACS rule out.     On examination, patient denies back pain, but is having some neck tenderness.  Patient also is complaining of \"electrical shock\" feeling radiating down to his arm.  Patient endorses that he still feels somewhat generally weak from the event.  Also noted patient has a very red/pink coloration likely due to to alcohol use.  We will obtain blood alcohol level.  Discussed with patient and spouse regarding plan of care.     Discussed CODE STATUS with patient along with spouse.  At this time, patient would like to be full code.     I ordered a chemical stress test, trend troponin, telemetry monitoring, A1c, lipid panel, TSH, blood alcohol level. Also ordered MRI cervical spine due to c/o of shooting pain sensation on B/L upper extremities.    Patient was monitored overnight with no events noted.  Results from MRI cervical spine with contrast noted extra medullary intradural or extradural mass in the central canal of C3-C5 most consistent with a solid mass such as a meningioma, schwannoma or neurofibroma less likely dural metastasis or other " malignant tumor which contribute to central canal narrowing and cord compression particularly at C3-C4; also noted significant stenosis of the right internal carotid artery.  We will defer this to PCP to obtain ultrasound on carotid as this will place patient high risk for stroke.  Cardiac stress test was obtained which noted no reversible ischemia.  Due to MRI results, neurosurgery, Dr. Wright was consulted which she recommended high-dose steroid therapy and follow-up as an outpatient.    Patient seen and examined.  Discussed imaging results with patient and significant other who is at bedside.  Discussed following up with neurosurgery, Dr. Wright for management of care.  Patient prescribed 5-day course of high-dose prednisone 50 mg daily.  Also prescribe patient some muscle relaxant to help with upper back pain.  Patient will resume all home medications.  Patient also counseled about reducing alcohol intake to only 1-2 beers per day.  Patient also counseled in holding off hunting at this time as he will likely have some neurodeficit be given at any time.  All questions and concerns answered prior to being discharged.  Patient discharged home.       Therefore, he is discharged in guarded and stable condition to home with close outpatient follow-up.    The patient recovered much more quickly than anticipated on admission.    Discharge Date  11/13/22      FOLLOW UP ITEMS POST DISCHARGE  Please call [phone] to schedule PCP appointment for patient.    Required specialty appointments include:       Discharge Instructions per SANDY KhanRDionneN.    -Follow-up with PCP s/p hospitalization  -Discussed obtaining carotid ultrasound with PCP due to noted incidental finding of severe stenosis on MRI  -Referral to follow-up with neurosurgery, Dr. Alfonzo Wright has been placed  -Start taking prednisone 50 mg daily for 5 days in conjunction with baclofen to help with back pain  -Resume all other home  medications  -Minimize alcohol intake to 1-2 beers per day only  -Hold off being alone specifically hunting at this time as neurodeficits can come back at an unknown time    DIET: As tolerated    ACTIVITY: As tolerated    DIAGNOSIS: Generalized weakness, severe back pain radiating to neck    Return to ER if symptoms persist, chest pain, palpitations, shortness of breath, numbness, tingling, weakness, and high fevers.    DISCHARGE DIAGNOSES  Principal Problem (Resolved):    Chest pain POA: Yes  Active Problems:    Stented coronary artery POA: Yes      Overview: SANIA LAD    Essential hypertension POA: Yes    Dyslipidemia POA: Yes    Alcohol use POA: Yes    Hypothyroidism (acquired) POA: Yes    Hyponatremia POA: Yes    Cervical spinal cord compression (HCC) POA: Yes      FOLLOW UP    Sultan KEARA Mars M.D.  [address]  [facility] [id number] [phone]    Schedule an appointment as soon as possible for a visit in 1 week(s)      Alfonzo Wright M.D.  [address]  [address]  [facility] [address]  [phone]    Schedule an appointment as soon as possible for a visit in 1 week(s)        MEDICATIONS ON DISCHARGE     Medication List        START taking these medications        Instructions   baclofen 10 MG Tabs  Commonly known as: LIORESAL   Take 1 Tablet by mouth 3 times a day for 10 days.  Dose: 10 mg     predniSONE 50 MG Tabs  Commonly known as: DELTASONE   Take 1 Tablet by mouth every day for 5 days.  Dose: 50 mg            CONTINUE taking these medications        Instructions   amLODIPine 5 MG Tabs  Commonly known as: NORVASC   Doctor's comments: **Patient requests 90 days supply**  Take 1 Tablet by mouth every day.  Dose: 5 mg     aspirin 81 MG EC tablet   Take 1 Tablet by mouth every day.  Dose: 81 mg     carvedilol 12.5 MG Tabs  Commonly known as: COREG   Take 1 Tablet by mouth 2 times a day with meals.  Dose: 12.5 mg     ibuprofen 200 MG Tabs  Commonly known as: MOTRIN   Take 3 Tablets by mouth every 6 hours as  needed for Mild Pain or Moderate Pain. 3 Tablets = 600 mg.  Dose: 600 mg     levothyroxine 125 MCG Tabs  Commonly known as: SYNTHROID   Doctor's comments: **Patient requests 90 days supply**  Take 1 Tablet by mouth every morning on an empty stomach.  Dose: 125 mcg     losartan 100 MG Tabs  Commonly known as: COZAAR   Doctor's comments: **Patient requests 90 days supply**  TAKE 1 TABLET BY MOUTH EVERY DAY  Dose: 100 mg     omeprazole 40 MG delayed-release capsule  Commonly known as: PRILOSEC   Doctor's comments: ZERO refills remain on this prescription. Your patient is requesting advance approval of refills for this medication to PREVENT ANY MISSED DOSES  Take 1 Capsule by mouth every day.  Dose: 40 mg     Potassium 99 MG Tabs   Take 1 Tablet by mouth every morning. Indications: OTC Supplement  Dose: 1 Tablet     rosuvastatin 20 MG Tabs  Commonly known as: CRESTOR   Take 1 Tablet by mouth every evening.  Dose: 20 mg              Allergies  No Known Allergies    DIET  Orders Placed This Encounter   Procedures    Diet Order Diet: Cardiac; Miscellaneous modifications: (optional): No Decaf, No Caffeine(for test)     Standing Status:   Standing     Number of Occurrences:   1     Order Specific Question:   Diet:     Answer:   Cardiac [6]     Order Specific Question:   Miscellaneous modifications: (optional)     Answer:   No Decaf, No Caffeine(for test) [11]       ACTIVITY  As tolerated.  Weight bearing as tolerated    CONSULTATIONS  Neurosurgery    PROCEDURES  NONE    IMAGING    MR-CERVICAL SPINE-WITH   Final Result      1.  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 This is most consistent with a solid mass such as a meningioma, schwannoma or neurofibroma, less likely dural metastasis or other malignant tumor.    It does contribute to central canal narrowing and cord compression particularly at C3-C4, as before.   2.  Findings suspicious for hemodynamically  significant stenosis of the RIGHT internal carotid artery. Further assessment could be performed with ultrasound.      NM-CARDIAC STRESS TEST         MR-CERVICAL SPINE-W/O   Final Result      1.  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 This would be an unusual appearance for hematoma or epidural abscess though these are considerations. A    meningioma, neurofibroma, schwannoma or other mass could also have this appearance. Recommend further assessment with contrast-enhanced MRI.   2.  Multilevel multifactorial degenerative changes   3.  Areas of central canal and neural foraminal narrowing as described above      Findings were communicated with and acknowledged by JAX GALLEGOS via Vo[employer] on 11/12/2022 6:15 PM.      DX-CHEST-PORTABLE (1 VIEW)   Final Result      1.  There is no acute cardiopulmonary process.      CT-CTA COMPLETE THORACOABDOMINAL AORTA   Final Result      1.  No aortic aneurysm, stenosis or dissection identified.      2.  Mild left heart enlargement with severe coronary atherosclerosis and mild ascending aortic dilatation at 4.0 cm      3.  Urinary bladder distended to the level of the umbilicus with moderate prostate enlargement that could indicate mild chronic outlet obstruction      4.  Left upper lobe noncalcified nodule measuring just under 4 mm. Follow-up recommendations as below      Low Risk: No routine follow-up      High Risk: Optional CT at 12 months      Comments: Nodules less than 6 mm do not require routine follow-up, but certain patients at high risk with suspicious nodule morphology, upper lobe location, or both may warrant 12-month follow-up.      Low Risk - Minimal or absent history of smoking and of other known risk factors.      High Risk - History of smoking or of other known risk factors.      Note: These recommendations do not apply to lung cancer screening,  patients with immunosuppression, or patients with known primary cancer.      Fleischner Society 2017 Guidelines for Management of Incidentally Detected Pulmonary Nodules in Adults                        LABORATORY  Lab Results   Component Value Date    SODIUM 129 (L) 11/13/2022    POTASSIUM 3.7 11/13/2022    CHLORIDE 95 (L) 11/13/2022    CO2 24 11/13/2022    GLUCOSE 105 (H) 11/13/2022    BUN 16 11/13/2022    CREATININE 0.82 11/13/2022    CREATININE 1.0 05/03/2005        Lab Results   Component Value Date    WBC 7.0 11/13/2022    HEMOGLOBIN 13.6 (L) 11/13/2022    HEMATOCRIT 40.1 (L) 11/13/2022    PLATELETCT 186 11/13/2022        Total time of the discharge process took 36 minutes.    ================================================================================================================================================================================  Please note that this dictation was created using voice recognition software. I have made every reasonable attempt to correct obvious errors, but there may be errors of grammar and possibly content that I did not discover before finalizing the note.    Electronically signed by:  Dr. STUART Velasquez, DNP, APRN, FNP-C  Hospitalist Services  [facility]  [phone]  [email or web address]  11/13/22                 1968

## 2022-11-13 NOTE — PROGRESS NOTES
Monitor summary: sr 63-76, CT 0.18, QRS .09, QT 0.40, no ectopy       per strip from monitor room.

## 2022-11-13 NOTE — CARE PLAN
The patient is Stable - Low risk of patient condition declining or worsening    Shift Goals  Clinical Goals: admit to CDU, MRI, monitor labs, pain control  Patient Goals: pain control, MRI    Progress made toward(s) clinical / shift goals:  MRI completed (pending results), labs WNL, PRN pain medications given as indicated.     Patient is not progressing towards the following goals: n/a

## 2022-11-13 NOTE — PROGRESS NOTES
4 Eyes Skin Assessment Completed by LUIS ENRIQUE Molina and BELL Juarez.    Head WDL  Ears WDL  Nose WDL  Mouth WDL  Neck WDL  Breast/Chest WDL  Shoulder Blades WDL  Spine WDL  (R) Arm/Elbow/Hand WDL  (L) Arm/Elbow/Hand WDL  Abdomen WDL  Groin WDL  Scrotum/Coccyx/Buttocks WDL  (R) Leg WDL  (L) Leg WDL  (R) Heel/Foot/Toe WDL  (L) Heel/Foot/Toe WDL          Devices In Places Tele Box and Pulse Ox      Interventions In Place Pillows    Possible Skin Injury No    Pictures Uploaded Into Epic N/A  Wound Consult Placed N/A  RN Wound Prevention Protocol Ordered No

## 2022-11-13 NOTE — PROGRESS NOTES
Pt aox4, continue to manage pain, pt had stress test done. Pt left for the MRI with contrast. Wife at the bedside. They are  Anxious to get results. Pt is hungry

## 2022-11-13 NOTE — DISCHARGE PLANNING
Case Management Discharge Planning    Admission Date: 11/12/2022  GMLOS:    ALOS: 0    6-Clicks ADL Score:    6-Clicks Mobility Score:        Anticipated Discharge Dispo: Discharge Disposition: Discharged to home/self care (01)  Discharge Address: [address], [place]  [address]  Discharge Contact Phone Number: [phone]    DME Needed: No    Action(s) Taken: Updated Provider/Nurse on Discharge Plan and DC Assessment Complete (See below)    Escalations Completed: None    Medically Clear: Yes    Next Steps: Pt will d/c home w/o any needs.    Barriers to Discharge: None      Pt is observation no IMM required.   
no

## 2022-11-13 NOTE — CARE PLAN
The patient is Stable - Low risk of patient condition declining or worsening    Shift Goals  Clinical Goals: shoulder pain  Patient Goals: rest, food    Progress made toward(s) clinical / shift goals:    Problem: Pain - Standard  Goal: Alleviation of pain or a reduction in pain to the patient’s comfort goal  Outcome: Met     Problem: Knowledge Deficit - Standard  Goal: Patient and family/care givers will demonstrate understanding of plan of care, disease process/condition, diagnostic tests and medications  Outcome: Met       Patient is not progressing towards the following goals:

## 2022-11-13 NOTE — DISCHARGE INSTRUCTIONS
FOLLOW UP ITEMS POST DISCHARGE  Please call [phone] to schedule PCP appointment for patient.    Required specialty appointments include:       Discharge Instructions per JAMI Khan    -Follow-up with PCP s/p hospitalization  -Discussed obtaining carotid ultrasound with PCP due to noted incidental finding of severe stenosis on MRI  -Referral to follow-up with neurosurgery, Dr. Alfonzo Wright has been placed  -Start taking prednisone 50 mg daily for 5 days in conjunction with baclofen to help with back pain  -Resume all other home medications  -Minimize alcohol intake to 1-2 beers per day only  -Hold off being alone specifically hunting at this time as neurodeficits can come back at an unknown time    DIET: As tolerated    ACTIVITY: As tolerated    DIAGNOSIS: Generalized weakness, severe back pain radiating to neck    Return to ER if symptoms persist, chest pain, palpitations, shortness of breath, numbness, tingling, weakness, and high fevers.

## 2022-11-13 NOTE — PROGRESS NOTES
Patient admitted to CDU. Patient is A&O x4 and on room air. Patient c/o minimal pain when first admitted. Patient went to MRI and returned in severe pain -PRN medications given. Patient is resting in bed with call light within reach, bed in the lowest position, and wheels locked. No needs at this time.

## 2022-11-18 DIAGNOSIS — E03.9 HYPOTHYROIDISM (ACQUIRED): ICD-10-CM

## 2022-11-18 DIAGNOSIS — I10 ESSENTIAL HYPERTENSION: ICD-10-CM

## 2022-11-18 RX ORDER — LEVOTHYROXINE SODIUM 0.12 MG/1
125 TABLET ORAL
Qty: 90 TABLET | Refills: 0 | Status: SHIPPED | OUTPATIENT
Start: 2022-11-18 | End: 2023-02-22

## 2022-11-18 RX ORDER — AMLODIPINE BESYLATE 5 MG/1
5 TABLET ORAL DAILY
Qty: 90 TABLET | Refills: 0 | Status: SHIPPED | OUTPATIENT
Start: 2022-11-18 | End: 2023-02-22

## 2022-11-18 NOTE — TELEPHONE ENCOUNTER
Received request via: Pharmacy    Was the patient seen in the last year in this department? Yes    Does the patient have an active prescription (recently filled or refills available) for medication(s) requested? No    Does the patient have nursing home Plus and need 100 day supply (blood pressure, diabetes and cholesterol meds only)? Patient does not have SCP

## 2022-11-29 ENCOUNTER — TELEPHONE (OUTPATIENT)
Dept: CARDIOLOGY | Facility: MEDICAL CENTER | Age: 72
End: 2022-11-29
Payer: MEDICARE

## 2022-11-29 RX ORDER — LOSARTAN POTASSIUM 100 MG/1
100 TABLET ORAL DAILY
Qty: 90 TABLET | Refills: 0 | Status: SHIPPED | OUTPATIENT
Start: 2022-11-29 | End: 2023-02-22

## 2022-11-30 NOTE — TELEPHONE ENCOUNTER
CW     Caller: Alanna- Patients Spouse     Topic/issue: Patients spouse called back in regards to medical clearance that needs sent to [facility]. Alanna states that their office has not yet received the clearance. Alanna would like to know if the patient will need to be seen for a visit prior to receiving clearance. Alanna states patient is in a lot pf pain. Alanna also request a call back to her direct phone. Please advise.     Callback Number: Alanna [phone]    Thank you,   Shelby LUNDBERG

## 2022-12-01 NOTE — TELEPHONE ENCOUNTER
CW    Caller: Alannamandy Arias (wife)    Office Name, phone number, fax number:     [facility]  T: [phone]  F: [phone]    Fax clearance to [phone]    Procedure Name: SPINAL FUSION    Procedure Scheduled Date: 12/12/22    Callback Number: [phone] (WIFE CELL)

## 2022-12-01 NOTE — TELEPHONE ENCOUNTER
Pt last seen by CW on 5/5/22, please see stratification request for pt to have spinal fusion on 12/12/22 and advise, ok to proceed?  Pt not on OAC's, is taking ASA 81mg.

## 2022-12-02 ENCOUNTER — HOSPITAL ENCOUNTER (OUTPATIENT)
Dept: CARDIOLOGY | Facility: MEDICAL CENTER | Age: 72
End: 2022-12-02
Attending: NEUROLOGICAL SURGERY
Payer: MEDICARE

## 2022-12-02 ENCOUNTER — APPOINTMENT (OUTPATIENT)
Dept: RADIOLOGY | Facility: IMAGING CENTER | Age: 72
End: 2022-12-02
Attending: NEUROLOGICAL SURGERY
Payer: MEDICARE

## 2022-12-02 ENCOUNTER — HOSPITAL ENCOUNTER (OUTPATIENT)
Dept: RADIOLOGY | Facility: MEDICAL CENTER | Age: 72
End: 2022-12-02
Attending: NEUROLOGICAL SURGERY
Payer: MEDICARE

## 2022-12-02 ENCOUNTER — HOSPITAL ENCOUNTER (OUTPATIENT)
Dept: LAB | Facility: MEDICAL CENTER | Age: 72
End: 2022-12-02
Attending: NEUROLOGICAL SURGERY
Payer: MEDICARE

## 2022-12-02 DIAGNOSIS — C41.2 MALIGNANT NEOPLASM OF VERTEBRAL COLUMN, EXCLUDING SACRUM AND COCCYX (HCC): ICD-10-CM

## 2022-12-02 DIAGNOSIS — M48.02 CERVICAL SPINAL STENOSIS: ICD-10-CM

## 2022-12-02 DIAGNOSIS — Z01.810 PRE-OPERATIVE CARDIOVASCULAR EXAMINATION: ICD-10-CM

## 2022-12-02 LAB
ANION GAP SERPL CALC-SCNC: 10 MMOL/L (ref 7–16)
BASOPHILS # BLD AUTO: 2 % (ref 0–1.8)
BASOPHILS # BLD: 0.13 K/UL (ref 0–0.12)
BUN SERPL-MCNC: 17 MG/DL (ref 8–22)
CALCIUM SERPL-MCNC: 9.4 MG/DL (ref 8.5–10.5)
CHLORIDE SERPL-SCNC: 103 MMOL/L (ref 96–112)
CO2 SERPL-SCNC: 26 MMOL/L (ref 20–33)
CREAT SERPL-MCNC: 0.82 MG/DL (ref 0.5–1.4)
EKG IMPRESSION: NORMAL
EOSINOPHIL # BLD AUTO: 0.27 K/UL (ref 0–0.51)
EOSINOPHIL NFR BLD: 4.2 % (ref 0–6.9)
ERYTHROCYTE [DISTWIDTH] IN BLOOD BY AUTOMATED COUNT: 41.4 FL (ref 35.9–50)
GFR SERPLBLD CREATININE-BSD FMLA CKD-EPI: 92 ML/MIN/1.73 M 2
GLUCOSE SERPL-MCNC: 97 MG/DL (ref 65–99)
HCT VFR BLD AUTO: 46.2 % (ref 42–52)
HGB BLD-MCNC: 15.3 G/DL (ref 14–18)
IMM GRANULOCYTES # BLD AUTO: 0.02 K/UL (ref 0–0.11)
IMM GRANULOCYTES NFR BLD AUTO: 0.3 % (ref 0–0.9)
LYMPHOCYTES # BLD AUTO: 2.02 K/UL (ref 1–4.8)
LYMPHOCYTES NFR BLD: 31.3 % (ref 22–41)
MCH RBC QN AUTO: 30 PG (ref 27–33)
MCHC RBC AUTO-ENTMCNC: 33.1 G/DL (ref 33.7–35.3)
MCV RBC AUTO: 90.6 FL (ref 81.4–97.8)
MONOCYTES # BLD AUTO: 0.65 K/UL (ref 0–0.85)
MONOCYTES NFR BLD AUTO: 10.1 % (ref 0–13.4)
NEUTROPHILS # BLD AUTO: 3.36 K/UL (ref 1.82–7.42)
NEUTROPHILS NFR BLD: 52.1 % (ref 44–72)
NRBC # BLD AUTO: 0 K/UL
NRBC BLD-RTO: 0 /100 WBC
PLATELET # BLD AUTO: 252 K/UL (ref 164–446)
PMV BLD AUTO: 9.7 FL (ref 9–12.9)
POTASSIUM SERPL-SCNC: 4.3 MMOL/L (ref 3.6–5.5)
RBC # BLD AUTO: 5.1 M/UL (ref 4.7–6.1)
SODIUM SERPL-SCNC: 139 MMOL/L (ref 135–145)
WBC # BLD AUTO: 6.5 K/UL (ref 4.8–10.8)

## 2022-12-02 PROCEDURE — 72050 X-RAY EXAM NECK SPINE 4/5VWS: CPT

## 2022-12-02 PROCEDURE — 71046 X-RAY EXAM CHEST 2 VIEWS: CPT

## 2022-12-02 PROCEDURE — 85730 THROMBOPLASTIN TIME PARTIAL: CPT

## 2022-12-02 PROCEDURE — 85610 PROTHROMBIN TIME: CPT

## 2022-12-02 PROCEDURE — 93010 ELECTROCARDIOGRAM REPORT: CPT | Performed by: INTERNAL MEDICINE

## 2022-12-02 PROCEDURE — 36415 COLL VENOUS BLD VENIPUNCTURE: CPT

## 2022-12-02 PROCEDURE — 80048 BASIC METABOLIC PNL TOTAL CA: CPT

## 2022-12-02 PROCEDURE — 93005 ELECTROCARDIOGRAM TRACING: CPT

## 2022-12-02 PROCEDURE — 85025 COMPLETE CBC W/AUTO DIFF WBC: CPT

## 2022-12-03 LAB
APTT PPP: 32.2 SEC (ref 24.7–36)
INR PPP: 1.04 (ref 0.87–1.13)
PROTHROMBIN TIME: 13.5 SEC (ref 12–14.6)

## 2022-12-03 NOTE — TELEPHONE ENCOUNTER
He can proceed with the proposed procedure or surgery from a cardiac standpoint, no modifiable cardiovascular risk, no further cardiac testing required, hold antiplatelet as necessary.    It is my pleasure to participate in the care of Mr. Arias.  Please do not hesitate to contact me with questions or concerns. [facility] is available 24/7 for consultative services at [phone] in the perioperative period.    Electronically Signed    Demian Erwin MD PhD [facility]  Cardiologist [facility]    Please note that this dictation was created using voice recognition software. There may be errors I did not discover before finalizing the note.

## 2022-12-06 ENCOUNTER — HOSPITAL ENCOUNTER (OUTPATIENT)
Dept: LAB | Facility: MEDICAL CENTER | Age: 72
End: 2022-12-06
Attending: NEUROLOGICAL SURGERY
Payer: MEDICARE

## 2022-12-06 LAB
APPEARANCE UR: CLEAR
BILIRUB UR QL STRIP.AUTO: NEGATIVE
COLOR UR: YELLOW
GLUCOSE UR STRIP.AUTO-MCNC: NEGATIVE MG/DL
KETONES UR STRIP.AUTO-MCNC: NEGATIVE MG/DL
LEUKOCYTE ESTERASE UR QL STRIP.AUTO: NEGATIVE
MICRO URNS: NORMAL
NITRITE UR QL STRIP.AUTO: NEGATIVE
PH UR STRIP.AUTO: 6 [PH] (ref 5–8)
PROT UR QL STRIP: NEGATIVE MG/DL
RBC UR QL AUTO: NEGATIVE
SP GR UR STRIP.AUTO: 1.01
UROBILINOGEN UR STRIP.AUTO-MCNC: 0.2 MG/DL

## 2022-12-06 PROCEDURE — 81003 URINALYSIS AUTO W/O SCOPE: CPT

## 2022-12-06 NOTE — TELEPHONE ENCOUNTER
Telephone note signed by MD printed and faxed to, [phone], completed status.    Fax sent to scanning for reference via [employer], completed status received.

## 2022-12-07 ENCOUNTER — PRE-ADMISSION TESTING (OUTPATIENT)
Dept: ADMISSIONS | Facility: MEDICAL CENTER | Age: 72
DRG: 472 | End: 2022-12-07
Attending: NEUROLOGICAL SURGERY
Payer: MEDICARE

## 2022-12-07 RX ORDER — BACLOFEN 10 MG/1
10 TABLET ORAL 3 TIMES DAILY
COMMUNITY
Start: 2022-11-29 | End: 2023-10-10

## 2022-12-07 NOTE — TELEPHONE ENCOUNTER
CW    Caller: Alanna (wife)    Topic/issue: RE-FAX CLEARANCE    Per Alanna [facility] has not received the clearance that was faxed over on Monday 12/05. Alanna provided an alternate fax number that she would like for this clearance to be sent to.    F: [phone]    Thank you,  Roman KIM    Callback Number: [phone] (home)

## 2022-12-08 NOTE — TELEPHONE ENCOUNTER
Telephone note signed by MD printed and faxed to, [phone] as requested, completed status.    Fax confirmation sent to scanning for reference via kSARIA.

## 2022-12-12 ENCOUNTER — APPOINTMENT (OUTPATIENT)
Dept: RADIOLOGY | Facility: MEDICAL CENTER | Age: 72
DRG: 472 | End: 2022-12-12
Attending: NEUROLOGICAL SURGERY
Payer: MEDICARE

## 2022-12-12 ENCOUNTER — HOSPITAL ENCOUNTER (INPATIENT)
Facility: MEDICAL CENTER | Age: 72
LOS: 3 days | DRG: 472 | End: 2022-12-15
Attending: NEUROLOGICAL SURGERY | Admitting: NEUROLOGICAL SURGERY
Payer: MEDICARE

## 2022-12-12 ENCOUNTER — ANESTHESIA (OUTPATIENT)
Dept: SURGERY | Facility: MEDICAL CENTER | Age: 72
DRG: 472 | End: 2022-12-12
Payer: MEDICARE

## 2022-12-12 ENCOUNTER — ANESTHESIA EVENT (OUTPATIENT)
Dept: SURGERY | Facility: MEDICAL CENTER | Age: 72
DRG: 472 | End: 2022-12-12
Payer: MEDICARE

## 2022-12-12 DIAGNOSIS — G89.18 ACUTE POSTOPERATIVE PAIN: ICD-10-CM

## 2022-12-12 PROBLEM — M47.812 CERVICAL SPONDYLOSIS: Status: ACTIVE | Noted: 2022-12-12

## 2022-12-12 PROCEDURE — 95939 C MOTOR EVOKED UPR&LWR LIMBS: CPT | Performed by: NEUROLOGICAL SURGERY

## 2022-12-12 PROCEDURE — A9270 NON-COVERED ITEM OR SERVICE: HCPCS | Performed by: PHYSICIAN ASSISTANT

## 2022-12-12 PROCEDURE — 00670 ANES XTNSV SP&SPI CORD PX: CPT | Performed by: ANESTHESIOLOGY

## 2022-12-12 PROCEDURE — 700105 HCHG RX REV CODE 258: Performed by: ANESTHESIOLOGY

## 2022-12-12 PROCEDURE — 110371 HCHG SHELL REV 272: Performed by: NEUROLOGICAL SURGERY

## 2022-12-12 PROCEDURE — 110454 HCHG SHELL REV 250: Performed by: NEUROLOGICAL SURGERY

## 2022-12-12 PROCEDURE — 700105 HCHG RX REV CODE 258: Performed by: PHYSICIAN ASSISTANT

## 2022-12-12 PROCEDURE — 95940 IONM IN OPERATNG ROOM 15 MIN: CPT | Performed by: NEUROLOGICAL SURGERY

## 2022-12-12 PROCEDURE — 770001 HCHG ROOM/CARE - MED/SURG/GYN PRIV*

## 2022-12-12 PROCEDURE — 700102 HCHG RX REV CODE 250 W/ 637 OVERRIDE(OP): Performed by: PHYSICIAN ASSISTANT

## 2022-12-12 PROCEDURE — 160002 HCHG RECOVERY MINUTES (STAT): Performed by: NEUROLOGICAL SURGERY

## 2022-12-12 PROCEDURE — 700101 HCHG RX REV CODE 250: Performed by: PHYSICIAN ASSISTANT

## 2022-12-12 PROCEDURE — 502000 HCHG MISC OR IMPLANTS RC 0278: Performed by: NEUROLOGICAL SURGERY

## 2022-12-12 PROCEDURE — 700101 HCHG RX REV CODE 250: Performed by: NEUROLOGICAL SURGERY

## 2022-12-12 PROCEDURE — 95938 SOMATOSENSORY TESTING: CPT | Performed by: NEUROLOGICAL SURGERY

## 2022-12-12 PROCEDURE — 700111 HCHG RX REV CODE 636 W/ 250 OVERRIDE (IP): Performed by: PHYSICIAN ASSISTANT

## 2022-12-12 PROCEDURE — 160041 HCHG SURGERY MINUTES - EA ADDL 1 MIN LEVEL 4: Performed by: NEUROLOGICAL SURGERY

## 2022-12-12 PROCEDURE — 700111 HCHG RX REV CODE 636 W/ 250 OVERRIDE (IP): Performed by: NEUROLOGICAL SURGERY

## 2022-12-12 PROCEDURE — 160035 HCHG PACU - 1ST 60 MINS PHASE I: Performed by: NEUROLOGICAL SURGERY

## 2022-12-12 PROCEDURE — 160009 HCHG ANES TIME/MIN: Performed by: NEUROLOGICAL SURGERY

## 2022-12-12 PROCEDURE — 160048 HCHG OR STATISTICAL LEVEL 1-5: Performed by: NEUROLOGICAL SURGERY

## 2022-12-12 PROCEDURE — 88304 TISSUE EXAM BY PATHOLOGIST: CPT

## 2022-12-12 PROCEDURE — 99100 ANES PT EXTEME AGE<1 YR&>70: CPT | Performed by: ANESTHESIOLOGY

## 2022-12-12 PROCEDURE — 160029 HCHG SURGERY MINUTES - 1ST 30 MINS LEVEL 4: Performed by: NEUROLOGICAL SURGERY

## 2022-12-12 PROCEDURE — C1713 ANCHOR/SCREW BN/BN,TIS/BN: HCPCS | Performed by: NEUROLOGICAL SURGERY

## 2022-12-12 PROCEDURE — 00BW0ZZ EXCISION OF CERVICAL SPINAL CORD, OPEN APPROACH: ICD-10-PCS | Performed by: NEUROLOGICAL SURGERY

## 2022-12-12 PROCEDURE — 95861 NEEDLE EMG 2 EXTREMITIES: CPT | Performed by: NEUROLOGICAL SURGERY

## 2022-12-12 PROCEDURE — 95937 NEUROMUSCULAR JUNCTION TEST: CPT | Performed by: NEUROLOGICAL SURGERY

## 2022-12-12 PROCEDURE — 72040 X-RAY EXAM NECK SPINE 2-3 VW: CPT

## 2022-12-12 PROCEDURE — 01N10ZZ RELEASE CERVICAL NERVE, OPEN APPROACH: ICD-10-PCS | Performed by: NEUROLOGICAL SURGERY

## 2022-12-12 PROCEDURE — 700111 HCHG RX REV CODE 636 W/ 250 OVERRIDE (IP): Performed by: ANESTHESIOLOGY

## 2022-12-12 PROCEDURE — 0RG2071 FUSION OF 2 OR MORE CERVICAL VERTEBRAL JOINTS WITH AUTOLOGOUS TISSUE SUBSTITUTE, POSTERIOR APPROACH, POSTERIOR COLUMN, OPEN APPROACH: ICD-10-PCS | Performed by: NEUROLOGICAL SURGERY

## 2022-12-12 PROCEDURE — 700101 HCHG RX REV CODE 250: Performed by: ANESTHESIOLOGY

## 2022-12-12 PROCEDURE — 160036 HCHG PACU - EA ADDL 30 MINS PHASE I: Performed by: NEUROLOGICAL SURGERY

## 2022-12-12 DEVICE — IMPLANTABLE DEVICE: Type: IMPLANTABLE DEVICE | Site: SPINE CERVICAL | Status: FUNCTIONAL

## 2022-12-12 DEVICE — GRAFT ACTIFUSE ABX PUTTY 2.5ML: Type: IMPLANTABLE DEVICE | Site: SPINE CERVICAL | Status: FUNCTIONAL

## 2022-12-12 RX ORDER — ROCURONIUM BROMIDE 10 MG/ML
INJECTION, SOLUTION INTRAVENOUS PRN
Status: DISCONTINUED | OUTPATIENT
Start: 2022-12-12 | End: 2022-12-12 | Stop reason: SURG

## 2022-12-12 RX ORDER — AMOXICILLIN 250 MG
1 CAPSULE ORAL
Status: DISCONTINUED | OUTPATIENT
Start: 2022-12-12 | End: 2022-12-15 | Stop reason: HOSPADM

## 2022-12-12 RX ORDER — SUCCINYLCHOLINE CHLORIDE 20 MG/ML
INJECTION INTRAMUSCULAR; INTRAVENOUS PRN
Status: DISCONTINUED | OUTPATIENT
Start: 2022-12-12 | End: 2022-12-12 | Stop reason: SURG

## 2022-12-12 RX ORDER — CALCIUM CARBONATE 500 MG/1
500 TABLET, CHEWABLE ORAL 2 TIMES DAILY
Status: DISCONTINUED | OUTPATIENT
Start: 2022-12-12 | End: 2022-12-15 | Stop reason: HOSPADM

## 2022-12-12 RX ORDER — HYDRALAZINE HYDROCHLORIDE 20 MG/ML
5 INJECTION INTRAMUSCULAR; INTRAVENOUS
Status: DISCONTINUED | OUTPATIENT
Start: 2022-12-12 | End: 2022-12-12 | Stop reason: HOSPADM

## 2022-12-12 RX ORDER — ROSUVASTATIN CALCIUM 20 MG/1
20 TABLET, COATED ORAL EVERY EVENING
Status: DISCONTINUED | OUTPATIENT
Start: 2022-12-13 | End: 2022-12-15 | Stop reason: HOSPADM

## 2022-12-12 RX ORDER — DIPHENHYDRAMINE HCL 25 MG
25 TABLET ORAL EVERY 6 HOURS PRN
Status: DISCONTINUED | OUTPATIENT
Start: 2022-12-12 | End: 2022-12-15 | Stop reason: HOSPADM

## 2022-12-12 RX ORDER — OXYCODONE HYDROCHLORIDE AND ACETAMINOPHEN 5; 325 MG/1; MG/1
1 TABLET ORAL EVERY 4 HOURS PRN
Status: DISCONTINUED | OUTPATIENT
Start: 2022-12-12 | End: 2022-12-15 | Stop reason: HOSPADM

## 2022-12-12 RX ORDER — METOPROLOL TARTRATE 1 MG/ML
1 INJECTION, SOLUTION INTRAVENOUS
Status: DISCONTINUED | OUTPATIENT
Start: 2022-12-12 | End: 2022-12-12 | Stop reason: HOSPADM

## 2022-12-12 RX ORDER — ONDANSETRON 2 MG/ML
INJECTION INTRAMUSCULAR; INTRAVENOUS PRN
Status: DISCONTINUED | OUTPATIENT
Start: 2022-12-12 | End: 2022-12-12 | Stop reason: SURG

## 2022-12-12 RX ORDER — REMIFENTANIL HYDROCHLORIDE 1 MG/ML
INJECTION, POWDER, LYOPHILIZED, FOR SOLUTION INTRAVENOUS
Status: DISCONTINUED | OUTPATIENT
Start: 2022-12-12 | End: 2022-12-12 | Stop reason: SURG

## 2022-12-12 RX ORDER — ONDANSETRON 2 MG/ML
4 INJECTION INTRAMUSCULAR; INTRAVENOUS
Status: DISCONTINUED | OUTPATIENT
Start: 2022-12-12 | End: 2022-12-12 | Stop reason: HOSPADM

## 2022-12-12 RX ORDER — OXYCODONE AND ACETAMINOPHEN 10; 325 MG/1; MG/1
1 TABLET ORAL EVERY 4 HOURS PRN
Status: DISCONTINUED | OUTPATIENT
Start: 2022-12-12 | End: 2022-12-15 | Stop reason: HOSPADM

## 2022-12-12 RX ORDER — BUPIVACAINE HYDROCHLORIDE AND EPINEPHRINE 5; 5 MG/ML; UG/ML
INJECTION, SOLUTION PERINEURAL
Status: DISCONTINUED | OUTPATIENT
Start: 2022-12-12 | End: 2022-12-12 | Stop reason: HOSPADM

## 2022-12-12 RX ORDER — VANCOMYCIN HYDROCHLORIDE 1 G/20ML
INJECTION, POWDER, LYOPHILIZED, FOR SOLUTION INTRAVENOUS
Status: COMPLETED | OUTPATIENT
Start: 2022-12-12 | End: 2022-12-12

## 2022-12-12 RX ORDER — OXYCODONE HCL 5 MG/5 ML
10 SOLUTION, ORAL ORAL
Status: DISCONTINUED | OUTPATIENT
Start: 2022-12-12 | End: 2022-12-12 | Stop reason: HOSPADM

## 2022-12-12 RX ORDER — SODIUM CHLORIDE AND POTASSIUM CHLORIDE 150; 900 MG/100ML; MG/100ML
INJECTION, SOLUTION INTRAVENOUS CONTINUOUS
Status: DISCONTINUED | OUTPATIENT
Start: 2022-12-12 | End: 2022-12-15 | Stop reason: HOSPADM

## 2022-12-12 RX ORDER — HALOPERIDOL 5 MG/ML
1 INJECTION INTRAMUSCULAR
Status: DISCONTINUED | OUTPATIENT
Start: 2022-12-12 | End: 2022-12-12 | Stop reason: HOSPADM

## 2022-12-12 RX ORDER — AMLODIPINE BESYLATE 5 MG/1
5 TABLET ORAL DAILY
Status: DISCONTINUED | OUTPATIENT
Start: 2022-12-13 | End: 2022-12-15 | Stop reason: HOSPADM

## 2022-12-12 RX ORDER — LABETALOL HYDROCHLORIDE 5 MG/ML
10 INJECTION, SOLUTION INTRAVENOUS
Status: DISCONTINUED | OUTPATIENT
Start: 2022-12-12 | End: 2022-12-15 | Stop reason: HOSPADM

## 2022-12-12 RX ORDER — PREDNISONE 50 MG/1
50 TABLET ORAL DAILY
COMMUNITY
End: 2023-10-10

## 2022-12-12 RX ORDER — ONDANSETRON 2 MG/ML
4 INJECTION INTRAMUSCULAR; INTRAVENOUS EVERY 4 HOURS PRN
Status: DISCONTINUED | OUTPATIENT
Start: 2022-12-12 | End: 2022-12-15 | Stop reason: HOSPADM

## 2022-12-12 RX ORDER — DOCUSATE SODIUM 100 MG/1
100 CAPSULE, LIQUID FILLED ORAL 2 TIMES DAILY
Status: DISCONTINUED | OUTPATIENT
Start: 2022-12-12 | End: 2022-12-15 | Stop reason: HOSPADM

## 2022-12-12 RX ORDER — POLYETHYLENE GLYCOL 3350 17 G/17G
1 POWDER, FOR SOLUTION ORAL 2 TIMES DAILY PRN
Status: DISCONTINUED | OUTPATIENT
Start: 2022-12-12 | End: 2022-12-15 | Stop reason: HOSPADM

## 2022-12-12 RX ORDER — DIPHENHYDRAMINE HYDROCHLORIDE 50 MG/ML
25 INJECTION INTRAMUSCULAR; INTRAVENOUS EVERY 6 HOURS PRN
Status: DISCONTINUED | OUTPATIENT
Start: 2022-12-12 | End: 2022-12-15 | Stop reason: HOSPADM

## 2022-12-12 RX ORDER — HYDROMORPHONE HYDROCHLORIDE 1 MG/ML
0.5 INJECTION, SOLUTION INTRAMUSCULAR; INTRAVENOUS; SUBCUTANEOUS
Status: DISCONTINUED | OUTPATIENT
Start: 2022-12-12 | End: 2022-12-15 | Stop reason: HOSPADM

## 2022-12-12 RX ORDER — HYDROMORPHONE HYDROCHLORIDE 1 MG/ML
0.1 INJECTION, SOLUTION INTRAMUSCULAR; INTRAVENOUS; SUBCUTANEOUS
Status: DISCONTINUED | OUTPATIENT
Start: 2022-12-12 | End: 2022-12-12 | Stop reason: HOSPADM

## 2022-12-12 RX ORDER — ENOXAPARIN SODIUM 100 MG/ML
40 INJECTION SUBCUTANEOUS DAILY
Status: DISCONTINUED | OUTPATIENT
Start: 2022-12-13 | End: 2022-12-15 | Stop reason: HOSPADM

## 2022-12-12 RX ORDER — CARVEDILOL 12.5 MG/1
12.5 TABLET ORAL 2 TIMES DAILY WITH MEALS
Status: DISCONTINUED | OUTPATIENT
Start: 2022-12-12 | End: 2022-12-15 | Stop reason: HOSPADM

## 2022-12-12 RX ORDER — PHENYLEPHRINE HYDROCHLORIDE 10 MG/ML
INJECTION, SOLUTION INTRAMUSCULAR; INTRAVENOUS; SUBCUTANEOUS PRN
Status: DISCONTINUED | OUTPATIENT
Start: 2022-12-12 | End: 2022-12-12 | Stop reason: SURG

## 2022-12-12 RX ORDER — AMOXICILLIN 250 MG
1 CAPSULE ORAL NIGHTLY
Status: DISCONTINUED | OUTPATIENT
Start: 2022-12-12 | End: 2022-12-15 | Stop reason: HOSPADM

## 2022-12-12 RX ORDER — OXYCODONE HCL 5 MG/5 ML
5 SOLUTION, ORAL ORAL
Status: DISCONTINUED | OUTPATIENT
Start: 2022-12-12 | End: 2022-12-12 | Stop reason: HOSPADM

## 2022-12-12 RX ORDER — BUPIVACAINE HYDROCHLORIDE AND EPINEPHRINE 5; 5 MG/ML; UG/ML
INJECTION, SOLUTION EPIDURAL; INTRACAUDAL; PERINEURAL
Status: DISCONTINUED | OUTPATIENT
Start: 2022-12-12 | End: 2022-12-12 | Stop reason: HOSPADM

## 2022-12-12 RX ORDER — SODIUM CHLORIDE, SODIUM LACTATE, POTASSIUM CHLORIDE, CALCIUM CHLORIDE 600; 310; 30; 20 MG/100ML; MG/100ML; MG/100ML; MG/100ML
INJECTION, SOLUTION INTRAVENOUS
Status: DISCONTINUED | OUTPATIENT
Start: 2022-12-12 | End: 2022-12-12 | Stop reason: SURG

## 2022-12-12 RX ORDER — HYDROMORPHONE HYDROCHLORIDE 1 MG/ML
0.2 INJECTION, SOLUTION INTRAMUSCULAR; INTRAVENOUS; SUBCUTANEOUS
Status: DISCONTINUED | OUTPATIENT
Start: 2022-12-12 | End: 2022-12-12 | Stop reason: HOSPADM

## 2022-12-12 RX ORDER — LIDOCAINE HYDROCHLORIDE 20 MG/ML
INJECTION, SOLUTION EPIDURAL; INFILTRATION; INTRACAUDAL; PERINEURAL PRN
Status: DISCONTINUED | OUTPATIENT
Start: 2022-12-12 | End: 2022-12-12 | Stop reason: SURG

## 2022-12-12 RX ORDER — CEFAZOLIN SODIUM 1 G/3ML
INJECTION, POWDER, FOR SOLUTION INTRAMUSCULAR; INTRAVENOUS PRN
Status: DISCONTINUED | OUTPATIENT
Start: 2022-12-12 | End: 2022-12-12 | Stop reason: SURG

## 2022-12-12 RX ORDER — LOSARTAN POTASSIUM 50 MG/1
100 TABLET ORAL DAILY
Status: DISCONTINUED | OUTPATIENT
Start: 2022-12-13 | End: 2022-12-15 | Stop reason: HOSPADM

## 2022-12-12 RX ORDER — LEVOTHYROXINE SODIUM 0.12 MG/1
125 TABLET ORAL
Status: DISCONTINUED | OUTPATIENT
Start: 2022-12-13 | End: 2022-12-15 | Stop reason: HOSPADM

## 2022-12-12 RX ORDER — SODIUM CHLORIDE, SODIUM LACTATE, POTASSIUM CHLORIDE, CALCIUM CHLORIDE 600; 310; 30; 20 MG/100ML; MG/100ML; MG/100ML; MG/100ML
INJECTION, SOLUTION INTRAVENOUS CONTINUOUS
Status: ACTIVE | OUTPATIENT
Start: 2022-12-12 | End: 2022-12-12

## 2022-12-12 RX ORDER — ENEMA 19; 7 G/133ML; G/133ML
1 ENEMA RECTAL
Status: DISCONTINUED | OUTPATIENT
Start: 2022-12-12 | End: 2022-12-15 | Stop reason: HOSPADM

## 2022-12-12 RX ORDER — ACETAMINOPHEN 325 MG/1
650 TABLET ORAL EVERY 4 HOURS PRN
Status: DISCONTINUED | OUTPATIENT
Start: 2022-12-12 | End: 2022-12-15 | Stop reason: HOSPADM

## 2022-12-12 RX ORDER — BACLOFEN 10 MG/1
10 TABLET ORAL 3 TIMES DAILY
Status: DISCONTINUED | OUTPATIENT
Start: 2022-12-12 | End: 2022-12-15 | Stop reason: HOSPADM

## 2022-12-12 RX ORDER — HYDROMORPHONE HYDROCHLORIDE 1 MG/ML
0.4 INJECTION, SOLUTION INTRAMUSCULAR; INTRAVENOUS; SUBCUTANEOUS
Status: DISCONTINUED | OUTPATIENT
Start: 2022-12-12 | End: 2022-12-12 | Stop reason: HOSPADM

## 2022-12-12 RX ORDER — BISACODYL 10 MG
10 SUPPOSITORY, RECTAL RECTAL
Status: DISCONTINUED | OUTPATIENT
Start: 2022-12-12 | End: 2022-12-15 | Stop reason: HOSPADM

## 2022-12-12 RX ORDER — DEXAMETHASONE SODIUM PHOSPHATE 4 MG/ML
INJECTION, SOLUTION INTRA-ARTICULAR; INTRALESIONAL; INTRAMUSCULAR; INTRAVENOUS; SOFT TISSUE PRN
Status: DISCONTINUED | OUTPATIENT
Start: 2022-12-12 | End: 2022-12-12 | Stop reason: SURG

## 2022-12-12 RX ORDER — CEFAZOLIN SODIUM 1 G/3ML
INJECTION, POWDER, FOR SOLUTION INTRAMUSCULAR; INTRAVENOUS
Status: DISCONTINUED | OUTPATIENT
Start: 2022-12-12 | End: 2022-12-12 | Stop reason: HOSPADM

## 2022-12-12 RX ORDER — MIDAZOLAM HYDROCHLORIDE 1 MG/ML
INJECTION INTRAMUSCULAR; INTRAVENOUS PRN
Status: DISCONTINUED | OUTPATIENT
Start: 2022-12-12 | End: 2022-12-12 | Stop reason: SURG

## 2022-12-12 RX ORDER — SODIUM CHLORIDE, SODIUM LACTATE, POTASSIUM CHLORIDE, CALCIUM CHLORIDE 600; 310; 30; 20 MG/100ML; MG/100ML; MG/100ML; MG/100ML
INJECTION, SOLUTION INTRAVENOUS CONTINUOUS
Status: DISCONTINUED | OUTPATIENT
Start: 2022-12-12 | End: 2022-12-12 | Stop reason: HOSPADM

## 2022-12-12 RX ORDER — OMEPRAZOLE 20 MG/1
40 CAPSULE, DELAYED RELEASE ORAL
Status: DISCONTINUED | OUTPATIENT
Start: 2022-12-13 | End: 2022-12-15 | Stop reason: HOSPADM

## 2022-12-12 RX ORDER — ONDANSETRON 4 MG/1
4 TABLET, ORALLY DISINTEGRATING ORAL EVERY 4 HOURS PRN
Status: DISCONTINUED | OUTPATIENT
Start: 2022-12-12 | End: 2022-12-15 | Stop reason: HOSPADM

## 2022-12-12 RX ADMIN — HYDROMORPHONE HYDROCHLORIDE 0.5 MG: 1 INJECTION, SOLUTION INTRAMUSCULAR; INTRAVENOUS; SUBCUTANEOUS at 23:10

## 2022-12-12 RX ADMIN — PHENYLEPHRINE HYDROCHLORIDE 100 MCG: 10 INJECTION INTRAVENOUS at 15:53

## 2022-12-12 RX ADMIN — METHOCARBAMOL 1000 MG: 100 INJECTION INTRAMUSCULAR; INTRAVENOUS at 22:06

## 2022-12-12 RX ADMIN — PROPOFOL 200 MG: 10 INJECTION, EMULSION INTRAVENOUS at 15:03

## 2022-12-12 RX ADMIN — CEFAZOLIN 2 G: 330 INJECTION, POWDER, FOR SOLUTION INTRAMUSCULAR; INTRAVENOUS at 15:03

## 2022-12-12 RX ADMIN — FENTANYL CITRATE 100 MCG: 50 INJECTION, SOLUTION INTRAMUSCULAR; INTRAVENOUS at 15:03

## 2022-12-12 RX ADMIN — EPHEDRINE SULFATE 10 MG: 50 INJECTION, SOLUTION INTRAVENOUS at 15:07

## 2022-12-12 RX ADMIN — CARVEDILOL 12.5 MG: 12.5 TABLET, FILM COATED ORAL at 22:02

## 2022-12-12 RX ADMIN — PHENYLEPHRINE HYDROCHLORIDE 100 MCG: 10 INJECTION INTRAVENOUS at 15:07

## 2022-12-12 RX ADMIN — REMIFENTANIL HYDROCHLORIDE 0.15 MCG/KG/MIN: 1 INJECTION, POWDER, LYOPHILIZED, FOR SOLUTION INTRAVENOUS at 15:03

## 2022-12-12 RX ADMIN — OXYCODONE AND ACETAMINOPHEN 1 TABLET: 10; 325 TABLET ORAL at 20:52

## 2022-12-12 RX ADMIN — PHENYLEPHRINE HYDROCHLORIDE 50 MCG/MIN: 10 INJECTION INTRAVENOUS at 16:20

## 2022-12-12 RX ADMIN — EPHEDRINE SULFATE 10 MG: 50 INJECTION, SOLUTION INTRAVENOUS at 16:03

## 2022-12-12 RX ADMIN — SUGAMMADEX 200 MG: 100 INJECTION, SOLUTION INTRAVENOUS at 16:10

## 2022-12-12 RX ADMIN — BACLOFEN 10 MG: 10 TABLET ORAL at 21:33

## 2022-12-12 RX ADMIN — EPHEDRINE SULFATE 10 MG: 50 INJECTION, SOLUTION INTRAVENOUS at 15:36

## 2022-12-12 RX ADMIN — EPHEDRINE SULFATE 10 MG: 50 INJECTION, SOLUTION INTRAVENOUS at 15:08

## 2022-12-12 RX ADMIN — DEXAMETHASONE SODIUM PHOSPHATE 10 MG: 4 INJECTION, SOLUTION INTRA-ARTICULAR; INTRALESIONAL; INTRAMUSCULAR; INTRAVENOUS; SOFT TISSUE at 15:03

## 2022-12-12 RX ADMIN — LIDOCAINE HYDROCHLORIDE 100 MG: 20 INJECTION, SOLUTION EPIDURAL; INFILTRATION; INTRACAUDAL at 15:03

## 2022-12-12 RX ADMIN — MIDAZOLAM HYDROCHLORIDE 2 MG: 1 INJECTION, SOLUTION INTRAMUSCULAR; INTRAVENOUS at 14:52

## 2022-12-12 RX ADMIN — PHENYLEPHRINE HYDROCHLORIDE 100 MCG: 10 INJECTION INTRAVENOUS at 15:36

## 2022-12-12 RX ADMIN — EPHEDRINE SULFATE 10 MG: 50 INJECTION, SOLUTION INTRAVENOUS at 17:12

## 2022-12-12 RX ADMIN — POTASSIUM CHLORIDE AND SODIUM CHLORIDE: 900; 150 INJECTION, SOLUTION INTRAVENOUS at 21:43

## 2022-12-12 RX ADMIN — SODIUM CHLORIDE, POTASSIUM CHLORIDE, SODIUM LACTATE AND CALCIUM CHLORIDE: 600; 310; 30; 20 INJECTION, SOLUTION INTRAVENOUS at 14:56

## 2022-12-12 RX ADMIN — CEFAZOLIN 2 G: 2 INJECTION, POWDER, FOR SOLUTION INTRAMUSCULAR; INTRAVENOUS at 23:16

## 2022-12-12 RX ADMIN — SUCCINYLCHOLINE CHLORIDE 200 MG: 20 INJECTION, SOLUTION INTRAMUSCULAR; INTRAVENOUS; PARENTERAL at 15:03

## 2022-12-12 RX ADMIN — ROCURONIUM BROMIDE 50 MG: 10 INJECTION, SOLUTION INTRAVENOUS at 15:40

## 2022-12-12 RX ADMIN — ONDANSETRON 4 MG: 2 INJECTION INTRAMUSCULAR; INTRAVENOUS at 18:13

## 2022-12-12 ASSESSMENT — LIFESTYLE VARIABLES
DOES PATIENT WANT TO STOP DRINKING: NO
TOTAL SCORE: 0
HAVE YOU EVER FELT YOU SHOULD CUT DOWN ON YOUR DRINKING: NO
EVER FELT BAD OR GUILTY ABOUT YOUR DRINKING: NO
HAVE PEOPLE ANNOYED YOU BY CRITICIZING YOUR DRINKING: NO
ALCOHOL_USE: YES
AVERAGE NUMBER OF DAYS PER WEEK YOU HAVE A DRINK CONTAINING ALCOHOL: 4
CONSUMPTION TOTAL: POSITIVE
HOW MANY TIMES IN THE PAST YEAR HAVE YOU HAD 5 OR MORE DRINKS IN A DAY: 0
EVER HAD A DRINK FIRST THING IN THE MORNING TO STEADY YOUR NERVES TO GET RID OF A HANGOVER: NO
ON A TYPICAL DAY WHEN YOU DRINK ALCOHOL HOW MANY DRINKS DO YOU HAVE: 6

## 2022-12-12 ASSESSMENT — FIBROSIS 4 INDEX
FIB4 SCORE: 1.16
FIB4 SCORE: 1.16

## 2022-12-12 ASSESSMENT — PAIN DESCRIPTION - PAIN TYPE
TYPE: ACUTE PAIN

## 2022-12-12 ASSESSMENT — PAIN SCALES - GENERAL: PAIN_LEVEL: 0

## 2022-12-12 NOTE — PROGRESS NOTES
Med rec updated and complete, per pt   Allergies reviewed, per pt  Interviewed pt with wife at bedside with permission from pt.  Pts wife's had a list of medications that she read to this writer

## 2022-12-12 NOTE — ANESTHESIA PROCEDURE NOTES
Arterial Line  Performed by: Masood Aldana D.O.  Authorized by: Masood Aldana D.O.     Start Time:  12/12/2022 3:08 PM  End Time:  12/12/2022 3:10 PM  Localization: ultrasound guidance and surface landmarks    Patient Location:  OR  Indication: continuous blood pressure monitoring        Catheter Size:  20 G  Seldinger Technique?: Yes    Laterality:  Right  Site:  Radial artery  Line Secured:  Antimicrobial disc, tape and transparent dressing  Events: patient tolerated procedure well with no complications

## 2022-12-12 NOTE — ANESTHESIA PROCEDURE NOTES
Airway    Date/Time: 12/12/2022 3:04 PM  Performed by: Masood Aldana D.O.  Authorized by: Masood Aldana D.O.     Location:  OR  Urgency:  Elective  Indications for Airway Management:  Anesthesia      Spontaneous Ventilation: absent    Sedation Level:  Deep  Preoxygenated: Yes    Patient Position:  Sniffing  Mask Difficulty Assessment:  0 - not attempted  Final Airway Type:  Endotracheal airway  Final Endotracheal Airway:  ETT  Cuffed: Yes    Technique Used for Successful ETT Placement:  Direct laryngoscopy    Insertion Site:  Oral  Blade Type:  Avelina  Laryngoscope Blade/Videolaryngoscope Blade Size:  4  ETT Size (mm):  8.0  Measured from:  Teeth  ETT to Teeth (cm):  25  Placement Verified by: auscultation and capnometry    Cormack-Lehane Classification:  Grade IIb - view of arytenoids or posterior of glottis only  Number of Attempts at Approach:  1

## 2022-12-12 NOTE — ANESTHESIA PREPROCEDURE EVALUATION
Case: [id number] Date/Time: 12/12/22 1415    Procedures:       CERVICAL 3-7 LAMINOTOMY WITH RESECTION OF INTRADURAL TUMOR AND CERVICAL 3-7 FUSION      LAMINOTOMY      EXCISION, NEOPLASM, SPINAL CORD    Pre-op diagnosis: CERVICAL STENOSIS, INTRADURAL MASS    Location: [facility] OR 07 / SURGERY [facility]    Surgeons: Alfonzo Wright M.D.          Relevant Problems   ANESTHESIA   (positive) Obstructive sleep apnea      CARDIAC   (positive) Coronary artery disease due to lipid rich plaque   (positive) Essential hypertension   (positive) MI (myocardial infarction) (HCC)   (positive) Stented coronary artery      GI   (positive) GERD (gastroesophageal reflux disease)      ENDO   (positive) Hypothyroidism (acquired)       Physical Exam    Airway   Mallampati: II  TM distance: >3 FB  Neck ROM: full       Cardiovascular - normal exam  Rhythm: regular  Rate: normal  (-) murmur     Dental - normal exam           Pulmonary - normal exam  Breath sounds clear to auscultation     Abdominal    Neurological - normal exam                 Anesthesia Plan    ASA 3   ASA physical status 3 criteria: CAD/stents (> 3 months)    Plan - general       Airway plan will be ETT          Induction: intravenous    Postoperative Plan: Postoperative administration of opioids is intended.    Pertinent diagnostic labs and testing reviewed    Informed Consent:    Anesthetic plan and risks discussed with patient.    Use of blood products discussed with: patient whom consented to blood products.

## 2022-12-13 ENCOUNTER — APPOINTMENT (OUTPATIENT)
Dept: RADIOLOGY | Facility: MEDICAL CENTER | Age: 72
DRG: 472 | End: 2022-12-13
Attending: PHYSICIAN ASSISTANT
Payer: MEDICARE

## 2022-12-13 LAB
ANION GAP SERPL CALC-SCNC: 7 MMOL/L (ref 7–16)
BUN SERPL-MCNC: 12 MG/DL (ref 8–22)
CALCIUM SERPL-MCNC: 8.6 MG/DL (ref 8.5–10.5)
CHLORIDE SERPL-SCNC: 100 MMOL/L (ref 96–112)
CO2 SERPL-SCNC: 26 MMOL/L (ref 20–33)
CREAT SERPL-MCNC: 0.82 MG/DL (ref 0.5–1.4)
ERYTHROCYTE [DISTWIDTH] IN BLOOD BY AUTOMATED COUNT: 38.6 FL (ref 35.9–50)
GFR SERPLBLD CREATININE-BSD FMLA CKD-EPI: 92 ML/MIN/1.73 M 2
GLUCOSE SERPL-MCNC: 160 MG/DL (ref 65–99)
HCT VFR BLD AUTO: 41.6 % (ref 42–52)
HGB BLD-MCNC: 14.3 G/DL (ref 14–18)
MCH RBC QN AUTO: 29.7 PG (ref 27–33)
MCHC RBC AUTO-ENTMCNC: 34.4 G/DL (ref 33.7–35.3)
MCV RBC AUTO: 86.3 FL (ref 81.4–97.8)
PATHOLOGY CONSULT NOTE: NORMAL
PLATELET # BLD AUTO: 202 K/UL (ref 164–446)
PMV BLD AUTO: 9 FL (ref 9–12.9)
POTASSIUM SERPL-SCNC: 4.5 MMOL/L (ref 3.6–5.5)
RBC # BLD AUTO: 4.82 M/UL (ref 4.7–6.1)
SODIUM SERPL-SCNC: 133 MMOL/L (ref 135–145)
WBC # BLD AUTO: 9.3 K/UL (ref 4.8–10.8)

## 2022-12-13 PROCEDURE — 94760 N-INVAS EAR/PLS OXIMETRY 1: CPT

## 2022-12-13 PROCEDURE — 770001 HCHG ROOM/CARE - MED/SURG/GYN PRIV*

## 2022-12-13 PROCEDURE — A9270 NON-COVERED ITEM OR SERVICE: HCPCS

## 2022-12-13 PROCEDURE — 36415 COLL VENOUS BLD VENIPUNCTURE: CPT

## 2022-12-13 PROCEDURE — 700101 HCHG RX REV CODE 250: Performed by: PHYSICIAN ASSISTANT

## 2022-12-13 PROCEDURE — 80048 BASIC METABOLIC PNL TOTAL CA: CPT

## 2022-12-13 PROCEDURE — 700102 HCHG RX REV CODE 250 W/ 637 OVERRIDE(OP)

## 2022-12-13 PROCEDURE — 700111 HCHG RX REV CODE 636 W/ 250 OVERRIDE (IP): Performed by: PHYSICIAN ASSISTANT

## 2022-12-13 PROCEDURE — 700102 HCHG RX REV CODE 250 W/ 637 OVERRIDE(OP): Performed by: PHYSICIAN ASSISTANT

## 2022-12-13 PROCEDURE — 85027 COMPLETE CBC AUTOMATED: CPT

## 2022-12-13 PROCEDURE — A9270 NON-COVERED ITEM OR SERVICE: HCPCS | Performed by: PHYSICIAN ASSISTANT

## 2022-12-13 PROCEDURE — 700105 HCHG RX REV CODE 258: Performed by: PHYSICIAN ASSISTANT

## 2022-12-13 RX ORDER — PROMETHAZINE HYDROCHLORIDE 25 MG/1
25 TABLET ORAL EVERY 6 HOURS PRN
Status: DISCONTINUED | OUTPATIENT
Start: 2022-12-13 | End: 2022-12-15 | Stop reason: HOSPADM

## 2022-12-13 RX ADMIN — HYDROMORPHONE HYDROCHLORIDE 0.5 MG: 1 INJECTION, SOLUTION INTRAMUSCULAR; INTRAVENOUS; SUBCUTANEOUS at 20:56

## 2022-12-13 RX ADMIN — CEFAZOLIN 2 G: 2 INJECTION, POWDER, FOR SOLUTION INTRAMUSCULAR; INTRAVENOUS at 08:49

## 2022-12-13 RX ADMIN — POTASSIUM CHLORIDE AND SODIUM CHLORIDE: 900; 150 INJECTION, SOLUTION INTRAVENOUS at 11:31

## 2022-12-13 RX ADMIN — ONDANSETRON 4 MG: 2 INJECTION INTRAMUSCULAR; INTRAVENOUS at 17:04

## 2022-12-13 RX ADMIN — OMEPRAZOLE 40 MG: 20 CAPSULE, DELAYED RELEASE ORAL at 05:30

## 2022-12-13 RX ADMIN — OXYCODONE AND ACETAMINOPHEN 1 TABLET: 10; 325 TABLET ORAL at 02:07

## 2022-12-13 RX ADMIN — LEVOTHYROXINE SODIUM 125 MCG: 0.12 TABLET ORAL at 05:29

## 2022-12-13 RX ADMIN — BACLOFEN 10 MG: 10 TABLET ORAL at 11:30

## 2022-12-13 RX ADMIN — PROMETHAZINE HYDROCHLORIDE 25 MG: 25 TABLET ORAL at 22:13

## 2022-12-13 RX ADMIN — CEFAZOLIN 2 G: 2 INJECTION, POWDER, FOR SOLUTION INTRAMUSCULAR; INTRAVENOUS at 14:21

## 2022-12-13 RX ADMIN — CHOLECALCIFEROL TAB 125 MCG (5000 UNIT) 5000 UNITS: 125 TAB at 05:30

## 2022-12-13 RX ADMIN — OXYCODONE AND ACETAMINOPHEN 1 TABLET: 5; 325 TABLET ORAL at 11:31

## 2022-12-13 RX ADMIN — HYDROMORPHONE HYDROCHLORIDE 0.5 MG: 1 INJECTION, SOLUTION INTRAMUSCULAR; INTRAVENOUS; SUBCUTANEOUS at 05:25

## 2022-12-13 RX ADMIN — BACLOFEN 10 MG: 10 TABLET ORAL at 02:07

## 2022-12-13 RX ADMIN — ACETAMINOPHEN 650 MG: 325 TABLET, FILM COATED ORAL at 15:59

## 2022-12-13 RX ADMIN — LOSARTAN POTASSIUM 100 MG: 50 TABLET, FILM COATED ORAL at 05:30

## 2022-12-13 RX ADMIN — AMLODIPINE BESYLATE 5 MG: 5 TABLET ORAL at 05:30

## 2022-12-13 RX ADMIN — DIPHENHYDRAMINE HYDROCHLORIDE 25 MG: 50 INJECTION INTRAMUSCULAR; INTRAVENOUS at 20:53

## 2022-12-13 RX ADMIN — CARVEDILOL 12.5 MG: 12.5 TABLET, FILM COATED ORAL at 09:51

## 2022-12-13 RX ADMIN — ACETAMINOPHEN 650 MG: 325 TABLET, FILM COATED ORAL at 23:59

## 2022-12-13 ASSESSMENT — PAIN DESCRIPTION - PAIN TYPE
TYPE: ACUTE PAIN

## 2022-12-13 ASSESSMENT — PAIN SCALES - WONG BAKER: WONGBAKER_NUMERICALRESPONSE: HURTS JUST A LITTLE BIT

## 2022-12-13 NOTE — OP REPORT
DATE OF SERVICE:  12/12/2022        PREOPERATIVE DIAGNOSES:    1.  Severe cervical stenosis.  2.  Intradural contrast enhancing lesion from C3-C6.     POSTOPERATIVE DIAGNOSES:    1.  Severe cervical stenosis.  2.  Intradural arachnoid cyst.     PROCEDURES PERFORMED:    1.  C3-C6 posterior cervical instrumentation.  2.  C3 through C6 arthrodesis with autologous bone graft and Actifuse   demineralized bone matrix.  3.  C3 through C6 laminectomy for decompression.  4.  Intradural exploration with fenestration of apparent cyst.  5.  Use of intraoperative nerve monitoring, stable throughout the case.  6.  Use of intraoperative microscope.     SURGEON:  Alfonzo Wright MD     ASSISTANT:  Jaci Douglass M.D.     ANESTHESIOLOGIST:  Masood Aldana DO     ANESTHESIA:  General endotracheal anesthesia.     COMPLICATIONS:  None.     FINDINGS:  Well-positioned instrumentation.  No obvious tumor, but dense   arachnoid in the cervical spine.     BLOOD LOSS:  100 mL     COMPLICATIONS:  None.     INDICATIONS FOR PROCEDURE:  The patient is a 72-year-old male who initially   presented to [facility], then November after transient   quadriparesis episode.  According to the patient he was hunting and lost   function of all 4 extremities.  He was flown to [facility] and by   the time he arrived he had regained strength.  Imaging revealed cervical   stenosis with what appeared to be a dorsal intradural lesion.  The patient was   started on steroids and followed up with me in my clinic.  I proposed a   resection of the tumor as well as cervical decompression and stabilization for   cervical stenosis.  Risks and benefits were thoroughly discussed with the   patient and wife.  The risks include but not limited to bleeding, infection,   postoperative hematoma, sustained CSF leak, need for additional surgery, and   failure of instrumentation.  Additionally, there is high risk of neurologic   injury as we will be  operating on the cervical spinal cord that could lead to   quadriparesis, weakness, sensory changes, bowel or bladder dysfunction, sexual   dysfunction and need for tracheostomy.  Additionally, there is the risk of   anesthesia that includes not limited to myocardial infarction, stroke,   inability to extubate, blood clots and rare instance of death.  Despite these   risks, the patient wished to proceed with the procedure.     DESCRIPTION OF PROCEDURE:  Informed consent was obtained by the patient.  The   patient was brought to the operating theater and induced by anesthesia.    Neuromonitoring was applied in all four extremities with strong signal.   Patient was placed in [facility] tongs and placed prone on the Shin OSI   table with bivector traction at 15 pounds.  Small patch of hair was shaved and   then using fluoroscopy, the C3 through C6 levels were identified.  The   patient was prepped and draped in sterile fashion.  A timeout occurred and   preoperative antibiotics were given.  Planned incision was infiltrated with   Marcaine and a midline incision was made from approximately C3-C6 using Bovie   electrocautery, I bovied down to the spinous processes and then continued   reflecting the muscle off the lamina and lateral masses.  Fluoroscopy was used   to verify surgical levels.  Once verified I continued to expose the lateral   masses from C3 through C6.  Lateral masses exposed, I began placing   instrumentation on the right side using the Magerl technique. I drilled a    hole at approximately the midpoint of the lateral mass starting at C3   and then drilled screw tract to 14 mm, angled 30 degrees laterally and 30   degrees rostral.  I continued through the C4-C5 and C6 right lateral masses.    I then tapped the screw holes before placing [place] 3.5 x 16 lateral   mass screws.  I then proceeded to perform the same procedure on the left side,   placing 3.5 x 16 screws at C3, C5 and C6 at  3.5 x 14 screw at C4.  I   confirmed placement of the instrumentation with fluoroscopy.  I then brought   the microscope in for microscopic dissection, I drilled laminectomy troughs   bilaterally from C3 through C6 and then used an upgoing curette to carefully   release the lamina off the underlying ligamentum flavum.  There was brisk   bleeding after removing the lamina.  I irrigated the wound, obtained   hemostasis.  At this point, I used intraoperative ultrasound and cannot   clearly identify a contrast enhancing lesion seen on MRI.  I made a midline   durotomy with a 15 blade approximately at C4-C5 and then extended the durotomy   rostrally and caudally reflecting the dural leaflets laterally with a Nurolon   suture.  There was dense arachnoid at this level. Using microsurgical   instruments, I released the arachnoid off the underlying dura and then the   spinal cord.  After releasing the arachnoid CSF flowed freely there was an   area along the right side of the dura that appeared calcified with thickened   arachnoid.  I carefully dissected the arachnoid off of this and resected it   for permanent specimen.  At this point, I examined both intradural and   extradural areas and saw no obviously pathologic tissue.  I then proceeded to   close the durotomy with a 6-0 running [place] suture.  Once the dura was   closed, I performed a Valsalva maneuver and saw no obvious leak.  I placed a   strip of Gelfoam over the durotomy site and then sealed it with Tisseel.  I   then protected the exposed cervical spinal cord, obtained hemostasis in the   soft tissue and then switched the traction to extension.  I then placed   prebent 4-0 titanium rods into the tulips of the lateral mass screws, which   fit nicely.  I then gently secured them with set screws.  I obtained final   x-rays at this point, which demonstrated excellent position of the   instrumentation.  I final tightened all set screws and then used a high-speed    bur to decorticate the facet joints from C3 to C6 bilaterally.  I then packed   a mixture of demineralized bone matrix and autologous bone graft collected   from the laminectomies over the posterolateral masses for arthrodesis.  I   obtained hemostasis, placed 1 gram of vancomycin into the wound as well as a   drain not on suction.  I then closed the wound with the muscle and fascia,   closed with 0 Vicryl.  Dermis closed with 2-0 Vicryl and the skin closed with   Dermabond.  There were no complications noted.  Sponge and needle counts were   correct x2.  The patient was turned to anesthesia for extubation.              ______________________________  MD SAMIRA Mejia/GAB    DD:  12/12/2022 19:03  DT:  12/12/2022 22:23    Job#:  [id number]    CC:Masood Aldana DO

## 2022-12-13 NOTE — THERAPY
Missed Therapy     Patient Name: Rodney Arias  Age:  72 y.o., Sex:  male  Medical Record #: [id number]  Today's Date: 12/13/2022    OT order received. Pt with strict 24 hr bed rest order. OT will evaluate once bed rest order is lifted and pt is able to participate in OOB activity.    Marisabel Lancsater, OTR/L

## 2022-12-13 NOTE — PROGRESS NOTES
Neurosurgery Progress Note    Subjective:  POD#1 C3-7 laminectomies with fusion with removal of intradural tumor.  Doing well, pain controlled.  Drain with 80 cc output last 8 hours.  Bedrest until  tonight.      Exam:  Motor 5/5.  Incision c/d/I.    BP  Min: 120/72  Max: 156/79  Pulse  Av.9  Min: 63  Max: 91  Resp  Av.8  Min: 16  Max: 22  Temp  Av.5 °C (97.7 °F)  Min: 36.1 °C (96.9 °F)  Max: 37 °C (98.6 °F)  SpO2  Av.5 %  Min: 93 %  Max: 98 %    No data recorded    Recent Labs     22  0250   WBC 9.3   RBC 4.82   HEMOGLOBIN 14.3   HEMATOCRIT 41.6*   MCV 86.3   MCH 29.7   MCHC 34.4   RDW 38.6   PLATELETCT 202   MPV 9.0     Recent Labs     22  0250   SODIUM 133*   POTASSIUM 4.5   CHLORIDE 100   CO2 26   GLUCOSE 160*   BUN 12   CREATININE 0.82   CALCIUM 8.6               Intake/Output                         22 0700 - 22 0659 22 0700 - 22 0659     8431-8925 4660-7976 Total 0446-1925 4028-5523 Total                 Intake    I.V.  1500  250 1750  622.2  -- 622.2    Volume (mL) (Lactated Ringers) 2110 158 9943 -- -- --    Volume (mL) (0.9 % NaCl with KCl 20 mEq infusion) -- -- -- 622.2 -- 622.2    IV Piggyback  --  -- --  218.4  -- 218.4    Volume (mL) (methocarbamol (ROBAXIN) 1,000 mg in  mL IVPB) -- -- -- 120 -- 120    Volume (mL) (ceFAZolin (Ancef) 2 g in  mL IVPB) -- -- -- 98.4 -- 98.4    Total Intake 9367 185 0765 840.7 -- 840.7       Output    Urine  --  1300 1300  --  -- --    Output (mL) (Urethral Catheter Latex) -- 1300 1300 -- -- --    Drains  --  160 160  --  -- --    Output (mL) (Closed/Suction Drain 1 Posterior Neck Hemovac 10 Fr.) -- 160 160 -- -- --    Blood  100  -- 100  --  -- --    Est. Blood Loss 100 -- 100 -- -- --    Total Output 100 1460 1560 -- -- --       Net I/O     1400 -1210 190 840.7 -- 840.7              Intake/Output Summary (Last 24 hours) at 2022 1135  Last data filed at 2022 0726  Gross per 24 hour    Intake 2590.66 ml   Output 1560 ml   Net 1030.66 ml             amLODIPine  5 mg DAILY    baclofen  10 mg TID    carvedilol  12.5 mg BID WITH MEALS    levothyroxine  125 mcg AM ES    losartan  100 mg DAILY    omeprazole  40 mg QDAY    rosuvastatin  20 mg Q EVENING    Pharmacy Consult Request  1 Each PHARMACY TO DOSE    MD ALERT...DO NOT ADMINISTER NSAIDS or ASPIRIN unless ORDERED By Neurosurgery  1 Each PRN    docusate sodium  100 mg BID    senna-docusate  1 Tablet Nightly    senna-docusate  1 Tablet Q24HRS PRN    polyethylene glycol/lytes  1 Packet BID PRN    magnesium hydroxide  30 mL QDAY PRN    bisacodyl  10 mg Q24HRS PRN    sodium phosphate  1 Each Once PRN    0.9 % NaCl with KCl 20 mEq 1,000 mL   Continuous    enoxaparin (LOVENOX) injection  40 mg DAILY AT 1800    acetaminophen  650 mg Q4HRS PRN    oxyCODONE-acetaminophen  1 Tablet Q4HRS PRN    oxyCODONE-acetaminophen  1 Tablet Q4HRS PRN    HYDROmorphone  0.5 mg Q3HRS PRN    ceFAZolin  2 g Q8HR    diphenhydrAMINE  25 mg Q6HRS PRN    Or    diphenhydrAMINE  25 mg Q6HRS PRN    ondansetron  4 mg Q4HRS PRN    ondansetron  4 mg Q4HRS PRN    labetalol  10 mg Q HOUR PRN    benzocaine-menthol  1 Lozenge Q2HRS PRN    calcium carbonate  500 mg BID    vitamin D3  5,000 Units DAILY       Assessment and Plan:  Hospital day #2  POD #1  Bedrest until 2000 today, then may be OOB as tolerated.  HOB 30 degrees or higher today.  Needs upright AP/lateral cervical x-rays tomorrow.  Leave drain today.  Ice incision as needed.   Prophylactic anticoagulation: yes         Start date/time: today

## 2022-12-13 NOTE — OR NURSING
Awake, alert and tolerating PO fluids.  Denies pain/nausea.  Incision closed with dermabond with Hemovac drain in place with bloody drainage. Moves all extremities. 4/5 strength bilateral upper extremities. AOx4 - flat affect.  Answers all questions with 1 word answers.  Vitals stable.  On monitors with alarms audible.    Let message for wife with update and room number.

## 2022-12-13 NOTE — ANESTHESIA POSTPROCEDURE EVALUATION
Patient: Rodney Arias    Procedure Summary     Date: 12/12/22 Room / Location: [place] [id number] / SURGERY [facility]    Anesthesia Start: 1456 Anesthesia Stop: 1902    Procedures:       CERVICAL 3-7 LAMINOTOMY WITH RESECTION OF INTRADURAL TUMOR AND CERVICAL 3-7 FUSION (Spine Cervical)      LAMINOTOMY (Spine Cervical)      EXCISION, NEOPLASM, SPINAL CORD (Spine Cervical) Diagnosis: (CERVICAL STENOSIS, INTRADURAL MASS)    Surgeons: Alfonzo Wright M.D. Responsible Provider: Masood Aldana D.O.    Anesthesia Type: general ASA Status: 3          Final Anesthesia Type: general  Last vitals  BP   Blood Pressure : (!) 146/74    Temp   36.3 °C (97.4 °F)    Pulse   89   Resp   18    SpO2   95 %      Anesthesia Post Evaluation    Patient location during evaluation: PACU  Patient participation: complete - patient participated  Level of consciousness: awake and alert  Pain score: 0    Airway patency: patent  Anesthetic complications: no  Cardiovascular status: hemodynamically stable  Respiratory status: acceptable  Hydration status: euvolemic    PONV: none          No notable events documented.     Nurse Pain Score: 0 (NPRS)

## 2022-12-13 NOTE — OR SURGEON
Immediate Post OP Note    PreOp Diagnosis: cervical intradural tumor      PostOp Diagnosis: cervical intradural cystic tumor      Procedure(s):  CERVICAL 3-7 LAMINOTOMY WITH RESECTION OF INTRADURAL TUMOR AND CERVICAL 3-7 FUSION - Wound Class: Clean  LAMINOTOMY - Wound Class: Clean  EXCISION, NEOPLASM, SPINAL CORD - Wound Class: Clean    Surgeon(s):  Alfonzo Wright M.D.    Anesthesiologist/Type of Anesthesia:  Anesthesiologist: Masood Aldana D.O./General    Surgical Staff:  Circulator: Kelsy Maldonado R.N.  Relief Circulator: Matias Mike R.N.  Relief Scrub: Dixon Nugent  Scrub Person: Dior Ma  Radiology Technologist: Brionna Presley    Specimens removed if any:  ID Type Source Tests Collected by Time Destination   A : Intradural lesion Other Other PATHOLOGY SPECIMEN Alfonzo Wright M.D. 12/12/2022  5:21 PM        Estimated Blood Loss: 100mL    Findings: intradural cystic tumor, went well, see full op report     Complications: none        12/12/2022 6:57 PM Jaci Douglass P.A.-C.

## 2022-12-13 NOTE — CARE PLAN
Problem: Pain - Standard  Goal: Alleviation of pain or a reduction in pain to the patient’s comfort goal  Outcome: Progressing   The patient is Stable - Low risk of patient condition declining or worsening    Shift Goals  Clinical Goals: Pain control and bed rest  Patient Goals: Pain control  Family Goals: Over all pt care    Progress made toward(s) clinical / shift goals:      Patient is not progressing towards the following goals:

## 2022-12-13 NOTE — THERAPY
Missed Therapy     Patient Name: Rodney Arias  Age:  72 y.o., Sex:  male  Medical Record #: [id number]  Today's Date: 12/13/2022    PT consult received. Pt with order for strict bedrest beginning 12/12 at 2042 for 24 hours. PT will evaluate once pt appropriate to participate in OOB activity.

## 2022-12-13 NOTE — OR NURSING
Transported to floor via gurney with transport.  On 2L O2 with tank >50% full and in soares.  O2 tubing connected to patient. RN notified that patient is on the way to the floor.

## 2022-12-14 ENCOUNTER — APPOINTMENT (OUTPATIENT)
Dept: RADIOLOGY | Facility: MEDICAL CENTER | Age: 72
DRG: 472 | End: 2022-12-14
Attending: PHYSICIAN ASSISTANT
Payer: MEDICARE

## 2022-12-14 LAB
ANION GAP SERPL CALC-SCNC: 10 MMOL/L (ref 7–16)
BUN SERPL-MCNC: 11 MG/DL (ref 8–22)
CALCIUM SERPL-MCNC: 8.9 MG/DL (ref 8.5–10.5)
CHLORIDE SERPL-SCNC: 103 MMOL/L (ref 96–112)
CO2 SERPL-SCNC: 26 MMOL/L (ref 20–33)
CREAT SERPL-MCNC: 0.74 MG/DL (ref 0.5–1.4)
ERYTHROCYTE [DISTWIDTH] IN BLOOD BY AUTOMATED COUNT: 39.7 FL (ref 35.9–50)
GFR SERPLBLD CREATININE-BSD FMLA CKD-EPI: 95 ML/MIN/1.73 M 2
GLUCOSE SERPL-MCNC: 131 MG/DL (ref 65–99)
HCT VFR BLD AUTO: 41.1 % (ref 42–52)
HGB BLD-MCNC: 13.9 G/DL (ref 14–18)
MCH RBC QN AUTO: 29.6 PG (ref 27–33)
MCHC RBC AUTO-ENTMCNC: 33.8 G/DL (ref 33.7–35.3)
MCV RBC AUTO: 87.4 FL (ref 81.4–97.8)
PLATELET # BLD AUTO: 256 K/UL (ref 164–446)
PMV BLD AUTO: 9.3 FL (ref 9–12.9)
POTASSIUM SERPL-SCNC: 4 MMOL/L (ref 3.6–5.5)
RBC # BLD AUTO: 4.7 M/UL (ref 4.7–6.1)
SODIUM SERPL-SCNC: 139 MMOL/L (ref 135–145)
WBC # BLD AUTO: 11.1 K/UL (ref 4.8–10.8)

## 2022-12-14 PROCEDURE — 36415 COLL VENOUS BLD VENIPUNCTURE: CPT

## 2022-12-14 PROCEDURE — A9270 NON-COVERED ITEM OR SERVICE: HCPCS | Performed by: PHYSICIAN ASSISTANT

## 2022-12-14 PROCEDURE — 700102 HCHG RX REV CODE 250 W/ 637 OVERRIDE(OP)

## 2022-12-14 PROCEDURE — 700101 HCHG RX REV CODE 250: Performed by: PHYSICIAN ASSISTANT

## 2022-12-14 PROCEDURE — 85027 COMPLETE CBC AUTOMATED: CPT

## 2022-12-14 PROCEDURE — 97535 SELF CARE MNGMENT TRAINING: CPT

## 2022-12-14 PROCEDURE — 770001 HCHG ROOM/CARE - MED/SURG/GYN PRIV*

## 2022-12-14 PROCEDURE — 700111 HCHG RX REV CODE 636 W/ 250 OVERRIDE (IP)

## 2022-12-14 PROCEDURE — 700102 HCHG RX REV CODE 250 W/ 637 OVERRIDE(OP): Performed by: PHYSICIAN ASSISTANT

## 2022-12-14 PROCEDURE — 97162 PT EVAL MOD COMPLEX 30 MIN: CPT

## 2022-12-14 PROCEDURE — 80048 BASIC METABOLIC PNL TOTAL CA: CPT

## 2022-12-14 PROCEDURE — A9270 NON-COVERED ITEM OR SERVICE: HCPCS

## 2022-12-14 PROCEDURE — 700111 HCHG RX REV CODE 636 W/ 250 OVERRIDE (IP): Performed by: PHYSICIAN ASSISTANT

## 2022-12-14 RX ORDER — PROCHLORPERAZINE EDISYLATE 5 MG/ML
10 INJECTION INTRAMUSCULAR; INTRAVENOUS EVERY 6 HOURS PRN
Status: CANCELLED | OUTPATIENT
Start: 2022-12-14

## 2022-12-14 RX ORDER — SCOLOPAMINE TRANSDERMAL SYSTEM 1 MG/1
1 PATCH, EXTENDED RELEASE TRANSDERMAL
Status: DISCONTINUED | OUTPATIENT
Start: 2022-12-14 | End: 2022-12-15 | Stop reason: HOSPADM

## 2022-12-14 RX ORDER — PROCHLORPERAZINE EDISYLATE 5 MG/ML
10 INJECTION INTRAMUSCULAR; INTRAVENOUS EVERY 6 HOURS PRN
Status: DISCONTINUED | OUTPATIENT
Start: 2022-12-14 | End: 2022-12-15 | Stop reason: HOSPADM

## 2022-12-14 RX ADMIN — CARVEDILOL 12.5 MG: 12.5 TABLET, FILM COATED ORAL at 08:22

## 2022-12-14 RX ADMIN — PROCHLORPERAZINE EDISYLATE 10 MG: 5 INJECTION INTRAMUSCULAR; INTRAVENOUS at 05:59

## 2022-12-14 RX ADMIN — PROMETHAZINE HYDROCHLORIDE 25 MG: 25 TABLET ORAL at 10:31

## 2022-12-14 RX ADMIN — ROSUVASTATIN CALCIUM 20 MG: 20 TABLET, FILM COATED ORAL at 17:07

## 2022-12-14 RX ADMIN — BACLOFEN 10 MG: 10 TABLET ORAL at 12:29

## 2022-12-14 RX ADMIN — ENOXAPARIN SODIUM 40 MG: 40 INJECTION SUBCUTANEOUS at 17:07

## 2022-12-14 RX ADMIN — SCOPOLAMINE 1 PATCH: 1.5 PATCH, EXTENDED RELEASE TRANSDERMAL at 05:08

## 2022-12-14 RX ADMIN — OMEPRAZOLE 40 MG: 20 CAPSULE, DELAYED RELEASE ORAL at 12:29

## 2022-12-14 RX ADMIN — PROCHLORPERAZINE EDISYLATE 10 MG: 5 INJECTION INTRAMUSCULAR; INTRAVENOUS at 16:55

## 2022-12-14 RX ADMIN — ACETAMINOPHEN 650 MG: 325 TABLET, FILM COATED ORAL at 23:28

## 2022-12-14 RX ADMIN — BACLOFEN 10 MG: 10 TABLET ORAL at 18:25

## 2022-12-14 RX ADMIN — POTASSIUM CHLORIDE AND SODIUM CHLORIDE: 900; 150 INJECTION, SOLUTION INTRAVENOUS at 17:02

## 2022-12-14 RX ADMIN — ONDANSETRON 4 MG: 2 INJECTION INTRAMUSCULAR; INTRAVENOUS at 08:59

## 2022-12-14 RX ADMIN — ACETAMINOPHEN 650 MG: 325 TABLET, FILM COATED ORAL at 08:22

## 2022-12-14 RX ADMIN — AMLODIPINE BESYLATE 5 MG: 5 TABLET ORAL at 08:22

## 2022-12-14 RX ADMIN — CARVEDILOL 12.5 MG: 12.5 TABLET, FILM COATED ORAL at 17:07

## 2022-12-14 RX ADMIN — POTASSIUM CHLORIDE AND SODIUM CHLORIDE: 900; 150 INJECTION, SOLUTION INTRAVENOUS at 05:07

## 2022-12-14 ASSESSMENT — COGNITIVE AND FUNCTIONAL STATUS - GENERAL
STANDING UP FROM CHAIR USING ARMS: A LITTLE
SUGGESTED CMS G CODE MODIFIER MOBILITY: CK
MOVING TO AND FROM BED TO CHAIR: A LOT
MOVING FROM LYING ON BACK TO SITTING ON SIDE OF FLAT BED: A LITTLE
MOBILITY SCORE: 16
CLIMB 3 TO 5 STEPS WITH RAILING: A LITTLE
TURNING FROM BACK TO SIDE WHILE IN FLAT BAD: A LOT
WALKING IN HOSPITAL ROOM: A LITTLE

## 2022-12-14 ASSESSMENT — GAIT ASSESSMENTS: GAIT LEVEL OF ASSIST: UNABLE TO PARTICIPATE

## 2022-12-14 ASSESSMENT — PAIN DESCRIPTION - PAIN TYPE: TYPE: ACUTE PAIN

## 2022-12-14 NOTE — PROGRESS NOTES
Paged out for the on call NS, due to pt having headaches and feeling nauseous. Patient had vomited twice, once during the day and earlier on the night shift. Hemovac drain was 200 mL @ 2000. Received call back from Cyndy LEVI. PA was informed and new orders were given with telephone read back.    @ 0017 paged out for the on call NS due to pt continues to have headaches and vomited a second time on night shift. Also pt's Hemovac out was 80 mL. Bedside RN completed a CSF test, the test was indicative of CSF leak.     @ 0022, received call back from cyndy BORGES. Above information was communicated to the PA. Orders were to continue to keep pt HOB 30 degree or higher and MAR was updated with new orders.

## 2022-12-14 NOTE — PROGRESS NOTES
Neurosurgery Progress Note    Subjective:  POD#2 C3-7 laminectomies with fusion with removal of intradural tumor.  Developed more pain with subsequent nausea with vomiting overnight.  Has not eaten much since being in hospital, and even few days prior to surgery.  Denies bilateral UE radicular pain, has expected posterior neck pain.  Drain with 140 cc output last 8 hours.  Not mobilized yet.       Exam:  Motor 5/5.  Incision c/d/I.    BP  Min: 102/87  Max: 160/75  Pulse  Av.5  Min: 69  Max: 86  Resp  Av.5  Min: 16  Max: 17  Temp  Av.8 °C (98.3 °F)  Min: 36.2 °C (97.2 °F)  Max: 37.6 °C (99.7 °F)  Monitored Temp 2  Av.1 °C (98.7 °F)  Min: 36.2 °C (97.2 °F)  Max: 37.6 °C (99.7 °F)  SpO2  Av.8 %  Min: 90 %  Max: 97 %    No data recorded    Recent Labs     22  0250 22  0539   WBC 9.3 11.1*   RBC 4.82 4.70   HEMOGLOBIN 14.3 13.9*   HEMATOCRIT 41.6* 41.1*   MCV 86.3 87.4   MCH 29.7 29.6   MCHC 34.4 33.8   RDW 38.6 39.7   PLATELETCT 202 256   MPV 9.0 9.3       Recent Labs     22  0250 22  0539   SODIUM 133* 139   POTASSIUM 4.5 4.0   CHLORIDE 100 103   CO2 26 26   GLUCOSE 160* 131*   BUN 12 11   CREATININE 0.82 0.74   CALCIUM 8.6 8.9                 Intake/Output                         22 0700 - 22 0659 22 07 - 12/15/22 0659      Total  Total                 Intake    P.O.  --  -- --  50  -- 50    P.O. -- -- -- 50 -- 50    I.V.  622.2  -- 622.2  --  -- --    Volume (mL) (0.9 % NaCl with KCl 20 mEq infusion) 622.2 -- 622.2 -- -- --    IV Piggyback  218.4  -- 218.4  --  -- --    Volume (mL) (methocarbamol (ROBAXIN) 1,000 mg in  mL IVPB) 120 -- 120 -- -- --    Volume (mL) (ceFAZolin (Ancef) 2 g in  mL IVPB) 98.4 -- 98.4 -- -- --    Total Intake 840.7 -- 840.7 50 -- 50       Output    Urine  1800  2200 4000  --  -- --    Urine Void (mL) 1800 2200 4000 -- -- --    Drains  65  340 405  --  -- --    Output (mL)  (Closed/Suction Drain 1 Posterior Neck Hemovac 10 Fr.) 65 340 405 -- -- --    Total Output 1865 2540 4405 -- -- --       Net I/O     -1024.3 -8240 -6304.3 50 -- 50              Intake/Output Summary (Last 24 hours) at 12/14/2022 1122  Last data filed at 12/14/2022 1000  Gross per 24 hour   Intake 50 ml   Output 4405 ml   Net -4355 ml               prochlorperazine  10 mg Q6HRS PRN    scopolamine  1 Patch Q72HRS    promethazine  25 mg Q6HRS PRN    amLODIPine  5 mg DAILY    baclofen  10 mg TID    carvedilol  12.5 mg BID WITH MEALS    levothyroxine  125 mcg AM ES    losartan  100 mg DAILY    omeprazole  40 mg QDAY    rosuvastatin  20 mg Q EVENING    Pharmacy Consult Request  1 Each PHARMACY TO DOSE    MD ALERT...DO NOT ADMINISTER NSAIDS or ASPIRIN unless ORDERED By Neurosurgery  1 Each PRN    docusate sodium  100 mg BID    senna-docusate  1 Tablet Nightly    senna-docusate  1 Tablet Q24HRS PRN    polyethylene glycol/lytes  1 Packet BID PRN    magnesium hydroxide  30 mL QDAY PRN    bisacodyl  10 mg Q24HRS PRN    sodium phosphate  1 Each Once PRN    0.9 % NaCl with KCl 20 mEq 1,000 mL   Continuous    enoxaparin (LOVENOX) injection  40 mg DAILY AT 1800    acetaminophen  650 mg Q4HRS PRN    oxyCODONE-acetaminophen  1 Tablet Q4HRS PRN    oxyCODONE-acetaminophen  1 Tablet Q4HRS PRN    HYDROmorphone  0.5 mg Q3HRS PRN    diphenhydrAMINE  25 mg Q6HRS PRN    Or    diphenhydrAMINE  25 mg Q6HRS PRN    ondansetron  4 mg Q4HRS PRN    ondansetron  4 mg Q4HRS PRN    labetalol  10 mg Q HOUR PRN    benzocaine-menthol  1 Lozenge Q2HRS PRN    calcium carbonate  500 mg BID    vitamin D3  5,000 Units DAILY       Assessment and Plan:  Hospital day #3  POD #2  May be upright and OOB as tolerated now.  PT/OT/ambulate as tolerated.  HOB 30 degrees or higher.  Try to encourage oral food intake; zofran as needed.   Needs upright AP/lateral cervical x-rays today once mobile  Leave drain at 1/2 compression.  Ice incision as needed.   Prophylactic  anticoagulation: yes         Start date/time: today

## 2022-12-14 NOTE — DISCHARGE PLANNING
Case Management Discharge Planning    Admission Date: 12/12/2022  GMLOS: 2.6  ALOS: 2    6-Clicks ADL Score:    6-Clicks Mobility Score:        Anticipated Discharge Dispo: Discharge Disposition: Disch to  rehab facility or distinct part unit (62)    DME Needed: No    Action(s) Taken: Updated Provider/Nurse on Discharge Plan    Escalations Completed: None    Medically Clear: No    Next Steps: Has not been OOB yet. Discharge plans undermined at this time.    Barriers to Discharge: Medical clearance    Is the patient up for discharge tomorrow: No

## 2022-12-14 NOTE — THERAPY
Occupational Therapy Contact Note:     12/14/22 4591   Interdisciplinary Plan of Care Collaboration   Collaboration Comments Per PT pt continues to have spinal HA. Will hold and round back as able       Dorota Solano OTR/L

## 2022-12-14 NOTE — DISCHARGE PLANNING
Care Transition Team Assessment    In the case of an emergency, pt's legal NOK is spouse Alanna Arias / [phone]    RNCM met with pt at bedside and obtained the information used in this assessment. Pt verified accuracy of facesheet. Pt lives in a single story home with spouse.  Pt uses [facility]. Prior to current hospitalization, pt was completely independent in ADLS/IADLS. Pt drives and is able to attend necessary MD appointments. Pt has a good support system. Pt denies any hx of substance use and denies any dx of mh.     Information Source:pt  Orientation Level: Oriented X4  Information Given By: Patient  Informant's Name: Murray  Who is responsible for making decisions for patient? : Patient              Interdisciplinary Discharge Planning  Does Admitting Nurse Feel This Could be a Complex Discharge?: No  Primary Care Physician: Madisyn  Lives with - Patient's Self Care Capacity: Spouse  Patient or legal guardian wants to designate a caregiver: Yes  Caregiver name: Radha Arias  Caregiver contact info: [phone]  Support Systems: Family Member(s)  Housing / Facility: 1 Story House  Do You Take your Prescribed Medications Regularly: Yes  Mobility Issues: No  Prior Services: None  Durable Medical Equipment: Not Applicable    Discharge Preparedness  What is your plan after discharge?: Uncertain - pending medical team collaboration  What are your discharge supports?: Spouse  Prior Functional Level: Ambulatory, Independent with Activities of Daily Living, Independent with Medication Management  Difficulity with ADLs: None  Difficulity with IADLs: None    Functional Assesment  Prior Functional Level: Ambulatory, Independent with Activities of Daily Living, Independent with Medication Management    Finances  Prescription Coverage: Yes    Vision / Hearing Impairment  Right Eye Vision: Impaired, Wears Glasses  Left Eye Vision: Impaired, Wears Glasses              Domestic Abuse  Have you ever  been the victim of abuse or violence?: No  Physical Abuse or Sexual Abuse: No  Verbal Abuse or Emotional Abuse: No  Possible Abuse/Neglect Reported to:: Not Applicable    Psychological Assessment  History of Substance Abuse: None  History of Psychiatric Problems: No         Anticipated Discharge Information  Discharge Disposition: Disch to  rehab facility or distinct part unit (62)

## 2022-12-15 ENCOUNTER — APPOINTMENT (OUTPATIENT)
Dept: RADIOLOGY | Facility: MEDICAL CENTER | Age: 72
DRG: 472 | End: 2022-12-15
Attending: PHYSICIAN ASSISTANT
Payer: MEDICARE

## 2022-12-15 VITALS
DIASTOLIC BLOOD PRESSURE: 90 MMHG | HEART RATE: 74 BPM | HEIGHT: 71 IN | OXYGEN SATURATION: 92 % | RESPIRATION RATE: 18 BRPM | TEMPERATURE: 99.5 F | WEIGHT: 224.87 LBS | BODY MASS INDEX: 31.48 KG/M2 | SYSTOLIC BLOOD PRESSURE: 157 MMHG

## 2022-12-15 LAB
ANION GAP SERPL CALC-SCNC: 10 MMOL/L (ref 7–16)
BUN SERPL-MCNC: 11 MG/DL (ref 8–22)
CALCIUM SERPL-MCNC: 8.6 MG/DL (ref 8.5–10.5)
CHLORIDE SERPL-SCNC: 100 MMOL/L (ref 96–112)
CO2 SERPL-SCNC: 26 MMOL/L (ref 20–33)
CREAT SERPL-MCNC: 0.7 MG/DL (ref 0.5–1.4)
ERYTHROCYTE [DISTWIDTH] IN BLOOD BY AUTOMATED COUNT: 38.9 FL (ref 35.9–50)
GFR SERPLBLD CREATININE-BSD FMLA CKD-EPI: 96 ML/MIN/1.73 M 2
GLUCOSE SERPL-MCNC: 116 MG/DL (ref 65–99)
HCT VFR BLD AUTO: 40.9 % (ref 42–52)
HGB BLD-MCNC: 13.8 G/DL (ref 14–18)
MCH RBC QN AUTO: 29.5 PG (ref 27–33)
MCHC RBC AUTO-ENTMCNC: 33.7 G/DL (ref 33.7–35.3)
MCV RBC AUTO: 87.4 FL (ref 81.4–97.8)
PLATELET # BLD AUTO: 217 K/UL (ref 164–446)
PMV BLD AUTO: 9.2 FL (ref 9–12.9)
POTASSIUM SERPL-SCNC: 3.8 MMOL/L (ref 3.6–5.5)
RBC # BLD AUTO: 4.68 M/UL (ref 4.7–6.1)
SODIUM SERPL-SCNC: 136 MMOL/L (ref 135–145)
WBC # BLD AUTO: 9.5 K/UL (ref 4.8–10.8)

## 2022-12-15 PROCEDURE — 700102 HCHG RX REV CODE 250 W/ 637 OVERRIDE(OP): Performed by: PHYSICIAN ASSISTANT

## 2022-12-15 PROCEDURE — 97535 SELF CARE MNGMENT TRAINING: CPT

## 2022-12-15 PROCEDURE — 72040 X-RAY EXAM NECK SPINE 2-3 VW: CPT

## 2022-12-15 PROCEDURE — 97116 GAIT TRAINING THERAPY: CPT

## 2022-12-15 PROCEDURE — 85027 COMPLETE CBC AUTOMATED: CPT

## 2022-12-15 PROCEDURE — 700101 HCHG RX REV CODE 250: Performed by: PHYSICIAN ASSISTANT

## 2022-12-15 PROCEDURE — 80048 BASIC METABOLIC PNL TOTAL CA: CPT

## 2022-12-15 PROCEDURE — 97530 THERAPEUTIC ACTIVITIES: CPT

## 2022-12-15 PROCEDURE — 97166 OT EVAL MOD COMPLEX 45 MIN: CPT

## 2022-12-15 PROCEDURE — A9270 NON-COVERED ITEM OR SERVICE: HCPCS | Performed by: PHYSICIAN ASSISTANT

## 2022-12-15 RX ORDER — ONDANSETRON 4 MG/1
4 TABLET, ORALLY DISINTEGRATING ORAL EVERY 8 HOURS PRN
Qty: 30 TABLET | Refills: 0 | Status: SHIPPED | OUTPATIENT
Start: 2022-12-15 | End: 2023-10-10

## 2022-12-15 RX ORDER — OXYCODONE HYDROCHLORIDE AND ACETAMINOPHEN 5; 325 MG/1; MG/1
1 TABLET ORAL EVERY 4 HOURS PRN
Qty: 42 TABLET | Refills: 0 | Status: SHIPPED | OUTPATIENT
Start: 2022-12-15 | End: 2022-12-22

## 2022-12-15 RX ADMIN — OXYCODONE AND ACETAMINOPHEN 1 TABLET: 5; 325 TABLET ORAL at 15:13

## 2022-12-15 RX ADMIN — POTASSIUM CHLORIDE AND SODIUM CHLORIDE: 900; 150 INJECTION, SOLUTION INTRAVENOUS at 04:31

## 2022-12-15 RX ADMIN — ACETAMINOPHEN 650 MG: 325 TABLET, FILM COATED ORAL at 04:31

## 2022-12-15 RX ADMIN — BACLOFEN 10 MG: 10 TABLET ORAL at 11:10

## 2022-12-15 ASSESSMENT — GAIT ASSESSMENTS
GAIT LEVEL OF ASSIST: CONTACT GUARD ASSIST
DISTANCE (FEET): 75
ASSISTIVE DEVICE: FRONT WHEEL WALKER;HAND HELD ASSIST
DEVIATION: BRADYKINETIC;INCREASED BASE OF SUPPORT

## 2022-12-15 ASSESSMENT — ACTIVITIES OF DAILY LIVING (ADL): TOILETING: INDEPENDENT

## 2022-12-15 ASSESSMENT — COGNITIVE AND FUNCTIONAL STATUS - GENERAL
CLIMB 3 TO 5 STEPS WITH RAILING: A LITTLE
SUGGESTED CMS G CODE MODIFIER MOBILITY: CL
STANDING UP FROM CHAIR USING ARMS: A LITTLE
SUGGESTED CMS G CODE MODIFIER DAILY ACTIVITY: CJ
MOVING TO AND FROM BED TO CHAIR: UNABLE
DRESSING REGULAR UPPER BODY CLOTHING: A LITTLE
DRESSING REGULAR LOWER BODY CLOTHING: A LITTLE
TURNING FROM BACK TO SIDE WHILE IN FLAT BAD: UNABLE
MOBILITY SCORE: 12
DAILY ACTIVITIY SCORE: 22
WALKING IN HOSPITAL ROOM: A LITTLE
MOVING FROM LYING ON BACK TO SITTING ON SIDE OF FLAT BED: UNABLE

## 2022-12-15 ASSESSMENT — PAIN DESCRIPTION - PAIN TYPE: TYPE: ACUTE PAIN;SURGICAL PAIN

## 2022-12-15 NOTE — THERAPY
Physical Therapy   Initial Evaluation     Patient Name: Rodney Arias  Age:  72 y.o., Sex:  male  Medical Record #: [id number]  Today's Date: 12/14/2022     Precautions: Fall Risk;Spinal / Back Precautions ;Cervical Collar    Comments: aspen collar donned EOB per orders    Assessment  Patient is 72 y.o. 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 Pts assessment limited by HA exacerbation with supine (3/10) to sit/stand (8-9/10). Pt instructed on spinal precautions and donning aspen collar EOB with wife present for education. He demonstrates 5/5 BLE strength and denies N/T in LEs. He was able to stand with HHA and march in place. Anticipate good progress with mob once HA and nausea resolve. Will update recommendations once pt able to tolerate gait trial.     Plan    Recommend Physical Therapy 4 times per week until therapy goals are met for the following treatments:  Bed Mobility, Community Re-integration, Equipment, Gait Training, Neuro Re-Education / Balance, Stair Training, Therapeutic Activities, and Therapeutic Exercises    DC Equipment Recommendations: Unable to determine at this time  Discharge Recommendations: Other - (TBD once pt able to tolerate upright activity to participate in gait assessment)     Objective    Prior Living Situation   Prior Services None   Housing / Facility 1 Story House   Steps Into Home 2   Equipment Owned Single Point Cane   Lives with - Patient's Self Care Capacity Spouse   Comments Spouse present and available to assist, very supportive   Prior Level of Functional Mobility   Bed Mobility Independent   Transfer Status Independent   Ambulation Independent   Distance Ambulation (Feet)   (Community distances)   Assistive Devices Used None   Stairs Independent   Comments Very active prior   Cognition    Cognition / Consciousness WDL   Level of Consciousness Alert   Comments cooperative, slightly limited by HA   Active ROM Lower Body    Active ROM Lower  Body  WDL   Strength Lower Body   Lower Body Strength  WDL   Comments BLE strength 5/5   Sensation Lower Body   Lower Extremity Sensation   WDL   Comments Denies N/T   Lower Body Muscle Tone   Lower Body Muscle Tone  WDL   Strength Upper Body   Upper Body Strength  WDL   Neurological Concerns   Neurological Concerns Yes   Comments HA with upright activity   Balance Assessment   Sitting Balance (Static) Fair +   Sitting Balance (Dynamic) Fair +   Standing Balance (Static) Fair   Standing Balance (Dynamic) Fair -   Weight Shift Sitting Fair   Weight Shift Standing Fair   Comments w/ HHA in standing   Gait Analysis   Gait Level Of Assist Unable to Participate  (d/t increased HA and nausea)   Bed Mobility    Supine to Sit Minimal Assist   Sit to Supine Minimal Assist   Scooting Standby Assist   Rolling Minimum Assist to Lt.   Comments cues to sequence log roll   Functional Mobility   Sit to Stand Contact Guard Assist   Bed, Chair, Wheelchair Transfer Unable to Participate  (d/t HA)   Mobility STS, standing marching   Short Term Goals    Short Term Goal # 1 Pt will perform supine<>sit with HOB flat via log roll with SPV within 6 visits.   Short Term Goal # 2 Pt will transfer from bed<>chair with LRAD and SPV within 6 visits.   Short Term Goal # 3 Pt will amb >150ft with LRAD and SPV within 6 visits.   Short Term Goal # 4 Pt will ascend/descend 1 platform step with LRAD and SPV within 6 visits.   Education Group   Education Provided Role of Physical Therapist;Spine Precautions;Brace Wear and Care   Spine Precautions Patient Response Patient;Family;Acceptance;Explanation;Handout;Action Demonstration   Role of Physical Therapist Patient Response Patient;Family;Acceptance;Explanation;Demonstration;Action Demonstration   Brace Wear & Care Patient Response Patient;Family;Acceptance;Explanation;Demonstration;Reinforcement Needed

## 2022-12-15 NOTE — CARE PLAN
The patient is Stable - Low risk of patient condition declining or worsening    Shift Goals  Clinical Goals: comfort, get OOB  Patient Goals: comfort  Family Goals: Safety and quality    Progress made toward(s) clinical / shift goals:  comfort w  Problem: Fall Risk  Goal: Patient will remain free from falls  Description: Target End Date:  Prior to discharge or change in level of care    Document interventions on the [place]hemy Pierce Fall Risk Assessment    1.  Assess for fall risk factors  2.  Implement fall precautions  Outcome: Progressing     Problem: Knowledge Deficit - Standard  Goal: Patient and family/care givers will demonstrate understanding of plan of care, disease process/condition, diagnostic tests and medications  Description: Target End Date:  1-3 days or as soon as patient condition allows    Document in Patient Education    1.  Patient and family/caregiver oriented to unit, equipment, visitation policy and means for communicating concern  2.  Complete/review Learning Assessment  3.  Assess knowledge level of disease process/condition, treatment plan, diagnostic tests and medications  4.  Explain disease process/condition, treatment plan, diagnostic tests and medications  Outcome: Progressing     Problem: Pain - Standard  Goal: Alleviation of pain or a reduction in pain to the patient’s comfort goal  Description: Target End Date:  Prior to discharge or change in level of care    Document on Vitals flowsheet    1.  Document pain using the appropriate pain scale per order or unit policy  2.  Educate and implement non-pharmacologic comfort measures (i.e. relaxation, distraction, massage, cold/heat therapy, etc.)  3.  Pain management medications as ordered  4.  Reassess pain after pain med administration per policy  5.  If opiods administered assess patient's response to pain medication is appropriate per POSS sedation scale  6.  Follow pain management plan developed in collaboration with patient and  interdisciplinary team (including palliative care or pain specialists if applicable)  Outcome: Progressing     Problem: Skin Integrity  Goal: Skin integrity is maintained or improved  Description: Target End Date:  Prior to discharge or change in level of care    Document interventions on Skin Risk/Isidro flowsheet groups and corresponding LDA    1.  Assess and monitor skin integrity, appearance and/or temperature  2.  Assess risk factors for impaired skin integrity and/or pressures ulcers  3.  Implement precautions to protect skin integrity in collaboration with interdisciplinary team  4.  Implement pressure ulcer prevention protocol if at risk for skin breakdown  5.  Confirm wound care consult if at risk for skin breakdown  6.  Ensure patient use of pressure relieving devices  (Low air loss bed, waffle overlay, heel protectors, ROHO cushion, etc)  Outcome: Progressing   th prns     Patient is not progressing towards the following goals:

## 2022-12-15 NOTE — PROGRESS NOTES
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  Still c/o posterior neck pain, denies bilateral UE radicular pain.  Drain with 120 cc output last 8 hours, and this did have compression on the drain overnight despite specific order indicating there should be no compression on this.  Mobilizing some.  Almanzar still in place.  Upright x-rays pending.         Exam:  Motor 5/5.  Incision c/d/I.    BP  Min: 130/70  Max: 163/92  Pulse  Av.8  Min: 73  Max: 90  Resp  Av.8  Min: 16  Max: 18  Temp  Av.2 °C (98.9 °F)  Min: 36.7 °C (98.1 °F)  Max: 37.5 °C (99.5 °F)  Monitored Temp 2  Av.5 °C (99.5 °F)  Min: 37.5 °C (99.5 °F)  Max: 37.5 °C (99.5 °F)  SpO2  Av.3 %  Min: 90 %  Max: 94 %    No data recorded    Recent Labs     22  0250 22  0539 12/15/22  0058   WBC 9.3 11.1* 9.5   RBC 4.82 4.70 4.68*   HEMOGLOBIN 14.3 13.9* 13.8*   HEMATOCRIT 41.6* 41.1* 40.9*   MCV 86.3 87.4 87.4   MCH 29.7 29.6 29.5   MCHC 34.4 33.8 33.7   RDW 38.6 39.7 38.9   PLATELETCT 202 256 217   MPV 9.0 9.3 9.2       Recent Labs     22  0250 22  0539 12/15/22  0058   SODIUM 133* 139 136   POTASSIUM 4.5 4.0 3.8   CHLORIDE 100 103 100   CO2 26 26 26   GLUCOSE 160* 131* 116*   BUN 12 11 11   CREATININE 0.82 0.74 0.70   CALCIUM 8.6 8.9 8.6                 Intake/Output                         22 0700 - 12/15/22 0659 12/15/22 07 - 22 0659      Total [id number] Total                 Intake    P.O.  100  -- 100  --  -- --    P.O. 100 -- 100 -- -- --    I.V.  1200  -- 1200  --  -- --    Volume (mL) (0.9 % NaCl with KCl 20 mEq infusion) 1200 -- 1200 -- -- --    Total Intake 1300 -- 1300 -- -- --       Output    Urine  2000  2600 4600  --  -- --    Urine Void (mL)  --  -- -- --    Output (mL) (Urethral Catheter Latex) -- 2600 2600 -- -- --    Drains  190  210 400  --  -- --    Output (mL) (Closed/Suction  Drain 1 Posterior Neck Hemovac 10 Fr.) 190 210 400 -- -- --    Total Output 2190 2810 5000 -- -- --       Net I/O     -802 -4549 -5340 -- -- --              Intake/Output Summary (Last 24 hours) at 12/15/2022 1153  Last data filed at 12/15/2022 0500  Gross per 24 hour   Intake 1250 ml   Output 3915 ml   Net -2665 ml               prochlorperazine  10 mg Q6HRS PRN    scopolamine  1 Patch Q72HRS    promethazine  25 mg Q6HRS PRN    amLODIPine  5 mg DAILY    baclofen  10 mg TID    carvedilol  12.5 mg BID WITH MEALS    levothyroxine  125 mcg AM ES    losartan  100 mg DAILY    omeprazole  40 mg QDAY    rosuvastatin  20 mg Q EVENING    Pharmacy Consult Request  1 Each PHARMACY TO DOSE    MD ALERT...DO NOT ADMINISTER NSAIDS or ASPIRIN unless ORDERED By Neurosurgery  1 Each PRN    docusate sodium  100 mg BID    senna-docusate  1 Tablet Nightly    senna-docusate  1 Tablet Q24HRS PRN    polyethylene glycol/lytes  1 Packet BID PRN    magnesium hydroxide  30 mL QDAY PRN    bisacodyl  10 mg Q24HRS PRN    sodium phosphate  1 Each Once PRN    0.9 % NaCl with KCl 20 mEq 1,000 mL   Continuous    enoxaparin (LOVENOX) injection  40 mg DAILY AT 1800    acetaminophen  650 mg Q4HRS PRN    oxyCODONE-acetaminophen  1 Tablet Q4HRS PRN    oxyCODONE-acetaminophen  1 Tablet Q4HRS PRN    HYDROmorphone  0.5 mg Q3HRS PRN    diphenhydrAMINE  25 mg Q6HRS PRN    Or    diphenhydrAMINE  25 mg Q6HRS PRN    ondansetron  4 mg Q4HRS PRN    ondansetron  4 mg Q4HRS PRN    labetalol  10 mg Q HOUR PRN    benzocaine-menthol  1 Lozenge Q2HRS PRN    calcium carbonate  500 mg BID    vitamin D3  5,000 Units DAILY       Assessment and Plan:  Hospital day #4  POD #3  Hemovac d/c'd this AM.  D/c arellano now.  Dr. Wright evaluated the patient and has cleared for d/c home today after x-rays completed, voiding fine, and incision dry.  Prophylactic anticoagulation: yes         Start date/time: today

## 2022-12-15 NOTE — THERAPY
Occupational Therapy   Initial Evaluation     Patient Name: Rodney Arias  Age:  72 y.o., Sex:  male  Medical Record #: [id number]  Today's Date: 12/15/2022     Precautions: (P) Spinal / Back Precautions , Cervical Collar    Comments: (P) Knott on EOB    Assessment  Patient is 72 y.o. male seen s/p C3-7 for OT evaluation. Pt demonstrated min A for dressing, mi A donning/ doffing brace, ADL txfs, and G/H. Provided extensive education to pt regarding maintaining neutral spine position during ADLs, brace wear and care, pacing of activities at home, compensatory strategies for IADLs, and recommended AD. Pt receptive to education and verbalized understanding and agreement. Spouse able to assist during post surgical recovery period. Pt endorsed headache post session 3/10, Rn aware. Patient will not be actively followed for occupational therapy services at this time, however may be seen if requested by physician for 1 more visit within 30 days to address any discharge or equipment needs.       Plan    Recommend Occupational Therapy  DC Needs      DC Equipment Recommendations: (P) None  Discharge Recommendations: (P) Recommend home health for continued occupational therapy services        12/15/22 0845   Prior Living Situation   Prior Services None   Housing / Facility 1 Story House   Bathroom Set up Bathtub / Shower Combination   Equipment Owned Single Point Cane   Lives with - Patient's Self Care Capacity Spouse   Comments reports IPLOF   Prior Level of ADL Function   Self Feeding Independent   Grooming / Hygiene Independent   Bathing Independent   Dressing Independent   Toileting Independent   Prior Level of IADL Function   Medication Management Independent   Laundry Independent   Kitchen Mobility Independent   Finances Independent   Home Management Independent   Shopping Independent   Precautions   Precautions Spinal / Back Precautions ;Cervical Collar     Comments Knott on EOB   Cognition    Cognition /  Consciousness X   Comments agreeable, reserved   Active ROM Upper Body   Active ROM Upper Body  WDL   Strength Upper Body   Upper Body Strength  WDL   Coordination Upper Body   Coordination WDL   Balance Assessment   Sitting Balance (Static) Fair +   Sitting Balance (Dynamic) Fair +   Standing Balance (Static) Fair   Standing Balance (Dynamic) Fair   Weight Shift Sitting Good   Weight Shift Standing Fair   Comments with FWW   Bed Mobility    Supine to Sit Supervised   Sit to Supine Supervised   ADL Assessment   Grooming Supervision;Standing   Upper Body Dressing Minimal Assist   Lower Body Dressing Minimal Assist   Toileting Supervision   Comments assist with brace, reports spouse can help   How much help from another person does the patient currently need...   6 Clicks Daily Activity Score 22   Functional Mobility   Sit to Stand Supervised   Bed, Chair, Wheelchair Transfer Supervised   Toilet Transfers Supervised   Patient / Family Goals   Patient / Family Goal #1 to go home   Education Group   Education Provided Back Safety   Role of Occupational Therapist Patient Response Patient;Acceptance;Explanation   Back Safety Patient Response Patient;Acceptance;Explanation;Handout   Brace Wear & Care Patient Response Patient;Family;Acceptance;Explanation;Demonstration;Reinforcement Needed   Anticipated Discharge Equipment and Recommendations   DC Equipment Recommendations None   Discharge Recommendations Recommend home health for continued occupational therapy services

## 2022-12-15 NOTE — PROGRESS NOTES
Pt A&Ox4 and VSS on 1L. Pt oriented to staff, room, and use of call light. Pt verbalized understanding of plan of care and fall precautions per policy. Pt instructed to call before ambulating to prevent falls. Bed locked and at lowest position. Call light in reach. All needs met at this time.

## 2022-12-15 NOTE — PROGRESS NOTES
Pt refusing all morning medication at this time apart from fluids and prn tylenol despite education.

## 2022-12-15 NOTE — THERAPY
Occupational Therapy   Initial Evaluation     Patient Name: Rodney Arias  Age:  72 y.o., Sex:  male  Medical Record #: [id number]  Today's Date: 12/15/2022     Precautions: (P) Spinal / Back Precautions , Cervical Collar    Comments: (P) [place]asper on EOB    Assessment  Patient is 72 y.o. male seen s/p C3-7 lami and fusion with tumor removal for OT evaluation. Pt primarily reserved during session, however reported 3/10 headache post session. Pt able to demonstrate min A for donning brace, min A for LB dressing, supervision for functional txfs. Pt has support of spouse upon DC. Patient will not be actively followed for occupational therapy services at this time, however may be seen if requested by physician for 1 more visit within 30 days to address any discharge or equipment needs. Will sign off.     Plan    Recommend Occupational Therapy DC needs    DC Equipment Recommendations: (P) None  Discharge Recommendations: (P) Recommend home health for continued occupational therapy services        12/15/22 0845   Prior Living Situation   Prior Services None   Housing / Facility 1 Story House   Bathroom Set up Bathtub / Shower Combination   Equipment Owned Single Point Cane   Lives with - Patient's Self Care Capacity Spouse   Comments reports IPLOF   Prior Level of ADL Function   Self Feeding Independent   Grooming / Hygiene Independent   Bathing Independent   Dressing Independent   Toileting Independent   Prior Level of IADL Function   Medication Management Independent   Laundry Independent   Kitchen Mobility Independent   Finances Independent   Home Management Independent   Shopping Independent   Precautions   Precautions Spinal / Back Precautions ;Cervical Collar     Comments Jasper on EOB   Cognition    Cognition / Consciousness X   Comments agreeable, reserved   Active ROM Upper Body   Active ROM Upper Body  WDL   Strength Upper Body   Upper Body Strength  WDL   Coordination Upper Body   Coordination WDL   Balance  Assessment   Sitting Balance (Static) Fair +   Sitting Balance (Dynamic) Fair +   Standing Balance (Static) Fair   Standing Balance (Dynamic) Fair   Weight Shift Sitting Good   Weight Shift Standing Fair   Comments with FWW   Bed Mobility    Supine to Sit Supervised   Sit to Supine Supervised   ADL Assessment   Grooming Supervision;Standing   Upper Body Dressing Minimal Assist   Lower Body Dressing Minimal Assist   Toileting Supervision   Comments assist with brace, reports spouse can help   How much help from another person does the patient currently need...   6 Clicks Daily Activity Score 22   Functional Mobility   Sit to Stand Supervised   Bed, Chair, Wheelchair Transfer Supervised   Toilet Transfers Supervised   Patient / Family Goals   Patient / Family Goal #1 to go home   Education Group   Education Provided Back Safety   Role of Occupational Therapist Patient Response Patient;Acceptance;Explanation   Back Safety Patient Response Patient;Acceptance;Explanation;Handout   Brace Wear & Care Patient Response Patient;Family;Acceptance;Explanation;Demonstration;Reinforcement Needed   Anticipated Discharge Equipment and Recommendations   DC Equipment Recommendations None   Discharge Recommendations Recommend home health for continued occupational therapy services

## 2022-12-15 NOTE — CARE PLAN
The patient is Stable - Low risk of patient condition declining or worsening    Shift Goals  Clinical Goals: Pain management  Patient Goals: comfort; rest  Family Goals: Safety and quality    Progress made toward(s) clinical / shift goals:  Pt's pain will be well managed during shift      Problem: Fall Risk  Goal: Patient will remain free from falls  Outcome: Progressing     Problem: Pain - Standard  Goal: Alleviation of pain or a reduction in pain to the patient’s comfort goal  Outcome: Progressing

## 2022-12-15 NOTE — THERAPY
"Physical Therapy   Daily Treatment     Patient Name: Rodney Arias  Age:  72 y.o., Sex:  male  Medical Record #: [id number]  Today's Date: 12/15/2022     Precautions  Precautions: Fall Risk;Cervical Collar  ;Spinal / Back Precautions   Comments: Aspen collar on at EOB    Assessment    Patient seen for follow up PT treatment session and demos improved OOB activity tolerance. He reports no HA with positional changes and is able to demo bed mobility, transfers and gait with Duane. Wife reports she is able to provide assist as needed.  Both patient and his wife needed further review of brace management and C-spine precautions.  Patient will need a FWW for DC. Discussed pain management and appropriate activity modifications.     Plan    Continue current treatment plan.    DC Equipment Recommendations: Front-Wheel Walker  Discharge Recommendations: Recommend outpatient physical therapy services to address higher level deficits      Subjective    \"I'm getting out of here today\"     Objective       12/15/22 1400   Precautions   Precautions Fall Risk;Cervical Collar  ;Spinal / Back Precautions    Comments Haywood collar on at EOB   Pain 0 - 10 Group   Location Neck   Therapist Pain Assessment During Activity;2  (no reports of headache during session)   Cognition    Cognition / Consciousness X   Level of Consciousness Alert   Safety Awareness Impaired   New Learning Impaired   Comments Pleasant and cooperative but focused on DC and needing cueing to maintain C-spine precautions throughout mobility   Standing Lower Body Exercises   Standing Lower Body Exercises Yes   Marching 1 set of 15   Comments warm up at EOB   Neuro-Muscular Treatments   Neuro-Muscular Treatments Compensatory Strategies;Weight Shift Right;Weight Shift Left;Postural Changes;Tactile Cuing;Verbal Cuing   Comments cueing for upright posture and visual scanning to prevent tripping on objects. patient with persistent downward gaze   Other Treatments   Other " Treatments Provided reviewed brace managment and provided caregiver education for bed mobility and maintaining C-spin precautions   Neurological Concerns   Neurological Concerns No   Balance   Sitting Balance (Static) Fair +   Sitting Balance (Dynamic) Fair +   Standing Balance (Static) Fair   Standing Balance (Dynamic) Fair -   Weight Shift Sitting Fair   Weight Shift Standing Fair   Gait Analysis   Gait Level Of Assist Contact Guard Assist   Assistive Device Front Wheel Walker;Hand Held Assist   Distance (Feet) 75   # of Times Distance was Traveled 2   Deviation Bradykinetic;Increased Base Of Support   Skilled Intervention Postural Facilitation;Sequencing;Tactile Cuing;Verbal Cuing;Compensatory Strategies   Comments improved gait stability with use of FWW   Bed Mobility    Supine to Sit Minimal Assist   Sit to Supine Minimal Assist   Scooting Supervised   Skilled Intervention Verbal Cuing;Tactile Cuing;Compensatory Strategies   Comments cueing to not push with B arms   Functional Mobility   Sit to Stand Contact Guard Assist   Bed, Chair, Wheelchair Transfer Contact Guard Assist   How much difficulty does the patient currently have...   Turning over in bed (including adjusting bedclothes, sheets and blankets)? 1   Sitting down on and standing up from a chair with arms (e.g., wheelchair, bedside commode, etc.) 1   Moving from lying on back to sitting on the side of the bed? 1   How much help from another person does the patient currently need...   Moving to and from a bed to a chair (including a wheelchair)? 3   Need to walk in a hospital room? 3   Climbing 3-5 steps with a railing? 3   6 clicks Mobility Score 12   Activity Tolerance   Sitting in Chair declined   Sitting Edge of Bed 5 min   Standing 10 min   Short Term Goals    Short Term Goal # 1 Pt will perform supine<>sit with HOB flat via log roll with SPV within 6 visits.   Goal Outcome # 1 Progressing as expected   Short Term Goal # 2 Pt will transfer from  bed<>chair with LRAD and SPV within 6 visits.   Goal Outcome # 2 Progressing as expected   Short Term Goal # 3 Pt will amb >150ft with LRAD and SPV within 6 visits.   Goal Outcome # 3 Progressing as expected   Short Term Goal # 4 Pt will ascend/descend 1 platform step with LRAD and SPV within 6 visits.   Goal Outcome # 4 Progressing as expected   Anticipated Discharge Equipment and Recommendations   DC Equipment Recommendations Front-Wheel Walker   Discharge Recommendations Recommend outpatient physical therapy services to address higher level deficits     Caroline Robb, PT, DPT, GCS

## 2022-12-16 NOTE — DISCHARGE SUMMARY
DATE OF ADMISSION:  12/12/2022   DATE OF DISCHARGE:  12/15/2022     DISCHARGE DIAGNOSIS:  Neck pain.     SURGERY PERFORMED:  C3-C7 laminectomies with fusion with removal of intradural   tumor performed by Dr. Alfonzo Wright.     COMPLICATIONS:  None.     REASON FOR ADMISSION:  This is a pleasant 72-year-old male who gives history   of progressive neck pain with upper extremity pain and weakness with   paresthesia.  His symptoms were refractory to conservative management   including physical therapy.  His preoperative workup showed degenerative   changes from C3 through 7 with intradural compressive lesion through these   areas and the patient was hereby indicated for the above surgery.     HOSPITAL COURSE:  The patient was admitted on the date of surgery.  He   underwent the above surgery without complication, was moved to the   neurosciences floor.  Here in the neurosciences floor, he has made a slow but   steady recovery postoperatively. He did have increased nausea with vomiting on   postoperative day #1, that is now well controlled with the patient likewise   then subsequently developed significant increase in output of his Hemovac   drain that was being compressed despite orders not to have this compressed.    He did have some headaches associated with this increased output. But here now   on postoperative day #3, the patient's pain is controlled with oral   medications.  He is ambulating some.  He is having his Almanzar discontinued this   morning.  His motor exam is 5/5.  His incision is clean, dry and intact and   he is hereby cleared for discharge home under stable condition after   relatively uneventful hospital stay.     DISCHARGE INSTRUCTIONS:  The patient is to eat a normal diet as tolerated.  He   is to walk as much as tolerated.  He is to avoid repetitive movements with   his arms, particularly above his shoulders.  He is to avoid lifting, pushing,   pulling greater than 15 pounds.  It would be okay for  the patient to drive in   2 weeks' time or when he is comfortable not taking narcotic pain medications.    He should take an over-the-counter stool softener while taking narcotic pain   medications.  He is encouraged to ice his incision for 10-15 minutes multiple   times per day.  He does have followup appointments in the office of [facility] in 2 and 6 weeks' time.  He is to keep those appointments.  He should   call our office or go to the nearest emergency department with any emergent   changes.  The patient was given these discharge instructions verbally and he   voiced understanding of them.     DISCHARGE MEDICATIONS:  In addition to the patient's normal home medications,   he will be discharged home with prescription for:  1.  Percocet 5/325 one tab p.o. q. 4-6 hours p.r.n. pain, dispensed #42, no   refills.  2.  Zofran 4 mg Dispertab 1 tab p.o. t.i.d. p.r.n. nausea, dispensed #30, no   refills.        ______________________________  MARITO PERKINS PA-C        ______________________________  MD CHRISS Mejia/SUNNY/SHI    DD:  12/15/2022 12:03  DT:  12/15/2022 17:45    Job#:  [id number]

## 2022-12-16 NOTE — DISCHARGE INSTRUCTIONS
No pushing, pulling or lifting greater than 10 pounds   No repetitive bending and twisting  No repetitive movement above shoulder level  Aspen Collar as instructed   Okay to shower, pat incision no dressing unless drainage to incision  Ambulate as much it is comfortable for you  No driving for at least two weeks after surgery  Obtain over the counter stool softener while you're taking narcotic pain medications

## 2022-12-18 ENCOUNTER — APPOINTMENT (OUTPATIENT)
Dept: RADIOLOGY | Facility: MEDICAL CENTER | Age: 72
End: 2022-12-18
Attending: EMERGENCY MEDICINE
Payer: MEDICARE

## 2022-12-18 ENCOUNTER — HOSPITAL ENCOUNTER (OUTPATIENT)
Facility: MEDICAL CENTER | Age: 72
End: 2022-12-21
Attending: EMERGENCY MEDICINE | Admitting: STUDENT IN AN ORGANIZED HEALTH CARE EDUCATION/TRAINING PROGRAM
Payer: MEDICARE

## 2022-12-18 DIAGNOSIS — R52 INTRACTABLE PAIN: ICD-10-CM

## 2022-12-18 DIAGNOSIS — M54.2 NECK PAIN: ICD-10-CM

## 2022-12-18 PROBLEM — I25.10 CAD (CORONARY ARTERY DISEASE): Chronic | Status: ACTIVE | Noted: 2022-12-18

## 2022-12-18 PROBLEM — Z98.890 STATUS POST LAMINECTOMY: Status: ACTIVE | Noted: 2022-12-18

## 2022-12-18 LAB
ANION GAP SERPL CALC-SCNC: 12 MMOL/L (ref 7–16)
BASOPHILS # BLD AUTO: 1.3 % (ref 0–1.8)
BASOPHILS # BLD: 0.11 K/UL (ref 0–0.12)
BUN SERPL-MCNC: 15 MG/DL (ref 8–22)
CALCIUM SERPL-MCNC: 9 MG/DL (ref 8.5–10.5)
CHLORIDE SERPL-SCNC: 97 MMOL/L (ref 96–112)
CO2 SERPL-SCNC: 25 MMOL/L (ref 20–33)
CREAT SERPL-MCNC: 0.78 MG/DL (ref 0.5–1.4)
CRP SERPL HS-MCNC: 2.09 MG/DL (ref 0–0.75)
EOSINOPHIL # BLD AUTO: 0.18 K/UL (ref 0–0.51)
EOSINOPHIL NFR BLD: 2 % (ref 0–6.9)
ERYTHROCYTE [DISTWIDTH] IN BLOOD BY AUTOMATED COUNT: 37.9 FL (ref 35.9–50)
ERYTHROCYTE [SEDIMENTATION RATE] IN BLOOD BY WESTERGREN METHOD: 26 MM/HOUR (ref 0–20)
GFR SERPLBLD CREATININE-BSD FMLA CKD-EPI: 93 ML/MIN/1.73 M 2
GLUCOSE SERPL-MCNC: 115 MG/DL (ref 65–99)
HCT VFR BLD AUTO: 42 % (ref 42–52)
HGB BLD-MCNC: 14.6 G/DL (ref 14–18)
IMM GRANULOCYTES # BLD AUTO: 0.04 K/UL (ref 0–0.11)
IMM GRANULOCYTES NFR BLD AUTO: 0.5 % (ref 0–0.9)
LYMPHOCYTES # BLD AUTO: 1.93 K/UL (ref 1–4.8)
LYMPHOCYTES NFR BLD: 21.9 % (ref 22–41)
MCH RBC QN AUTO: 29.9 PG (ref 27–33)
MCHC RBC AUTO-ENTMCNC: 34.8 G/DL (ref 33.7–35.3)
MCV RBC AUTO: 85.9 FL (ref 81.4–97.8)
MONOCYTES # BLD AUTO: 0.91 K/UL (ref 0–0.85)
MONOCYTES NFR BLD AUTO: 10.3 % (ref 0–13.4)
NEUTROPHILS # BLD AUTO: 5.63 K/UL (ref 1.82–7.42)
NEUTROPHILS NFR BLD: 64 % (ref 44–72)
NRBC # BLD AUTO: 0 K/UL
NRBC BLD-RTO: 0 /100 WBC
PLATELET # BLD AUTO: 224 K/UL (ref 164–446)
PMV BLD AUTO: 9.4 FL (ref 9–12.9)
POTASSIUM SERPL-SCNC: 3.5 MMOL/L (ref 3.6–5.5)
RBC # BLD AUTO: 4.89 M/UL (ref 4.7–6.1)
SODIUM SERPL-SCNC: 134 MMOL/L (ref 135–145)
WBC # BLD AUTO: 8.8 K/UL (ref 4.8–10.8)

## 2022-12-18 PROCEDURE — 36415 COLL VENOUS BLD VENIPUNCTURE: CPT

## 2022-12-18 PROCEDURE — 85025 COMPLETE CBC W/AUTO DIFF WBC: CPT

## 2022-12-18 PROCEDURE — 96376 TX/PRO/DX INJ SAME DRUG ADON: CPT

## 2022-12-18 PROCEDURE — 96375 TX/PRO/DX INJ NEW DRUG ADDON: CPT

## 2022-12-18 PROCEDURE — 96374 THER/PROPH/DIAG INJ IV PUSH: CPT

## 2022-12-18 PROCEDURE — G0378 HOSPITAL OBSERVATION PER HR: HCPCS

## 2022-12-18 PROCEDURE — 72125 CT NECK SPINE W/O DYE: CPT

## 2022-12-18 PROCEDURE — 80048 BASIC METABOLIC PNL TOTAL CA: CPT

## 2022-12-18 PROCEDURE — 86140 C-REACTIVE PROTEIN: CPT

## 2022-12-18 PROCEDURE — 99285 EMERGENCY DEPT VISIT HI MDM: CPT

## 2022-12-18 PROCEDURE — 700111 HCHG RX REV CODE 636 W/ 250 OVERRIDE (IP): Performed by: EMERGENCY MEDICINE

## 2022-12-18 PROCEDURE — 99220 PR INITIAL OBSERVATION CARE,LEVL III: CPT | Mod: FS

## 2022-12-18 PROCEDURE — 85652 RBC SED RATE AUTOMATED: CPT

## 2022-12-18 RX ORDER — ROSUVASTATIN CALCIUM 20 MG/1
20 TABLET, COATED ORAL EVERY EVENING
Status: DISCONTINUED | OUTPATIENT
Start: 2022-12-19 | End: 2022-12-21 | Stop reason: HOSPADM

## 2022-12-18 RX ORDER — AMLODIPINE BESYLATE 5 MG/1
5 TABLET ORAL DAILY
Status: DISCONTINUED | OUTPATIENT
Start: 2022-12-19 | End: 2022-12-21 | Stop reason: HOSPADM

## 2022-12-18 RX ORDER — OXYCODONE HYDROCHLORIDE 5 MG/1
5 TABLET ORAL
Status: DISCONTINUED | OUTPATIENT
Start: 2022-12-18 | End: 2022-12-21 | Stop reason: HOSPADM

## 2022-12-18 RX ORDER — OXYCODONE HYDROCHLORIDE 10 MG/1
10 TABLET ORAL
Status: DISCONTINUED | OUTPATIENT
Start: 2022-12-18 | End: 2022-12-21 | Stop reason: HOSPADM

## 2022-12-18 RX ORDER — POLYETHYLENE GLYCOL 3350 17 G/17G
1 POWDER, FOR SOLUTION ORAL
Status: DISCONTINUED | OUTPATIENT
Start: 2022-12-18 | End: 2022-12-21 | Stop reason: HOSPADM

## 2022-12-18 RX ORDER — HYDRALAZINE HYDROCHLORIDE 20 MG/ML
10 INJECTION INTRAMUSCULAR; INTRAVENOUS EVERY 6 HOURS PRN
Status: DISCONTINUED | OUTPATIENT
Start: 2022-12-18 | End: 2022-12-21 | Stop reason: HOSPADM

## 2022-12-18 RX ORDER — MORPHINE SULFATE 4 MG/ML
4 INJECTION INTRAVENOUS ONCE
Status: COMPLETED | OUTPATIENT
Start: 2022-12-18 | End: 2022-12-18

## 2022-12-18 RX ORDER — BACLOFEN 10 MG/1
10 TABLET ORAL 3 TIMES DAILY
Status: DISCONTINUED | OUTPATIENT
Start: 2022-12-19 | End: 2022-12-21 | Stop reason: HOSPADM

## 2022-12-18 RX ORDER — LEVOTHYROXINE SODIUM 0.12 MG/1
125 TABLET ORAL
Status: DISCONTINUED | OUTPATIENT
Start: 2022-12-19 | End: 2022-12-21 | Stop reason: HOSPADM

## 2022-12-18 RX ORDER — LOSARTAN POTASSIUM 50 MG/1
100 TABLET ORAL DAILY
Status: DISCONTINUED | OUTPATIENT
Start: 2022-12-19 | End: 2022-12-21 | Stop reason: HOSPADM

## 2022-12-18 RX ORDER — LIDOCAINE 50 MG/G
1 PATCH TOPICAL EVERY 24 HOURS
Status: DISCONTINUED | OUTPATIENT
Start: 2022-12-19 | End: 2022-12-21 | Stop reason: HOSPADM

## 2022-12-18 RX ORDER — ACETAMINOPHEN 325 MG/1
650 TABLET ORAL EVERY 6 HOURS PRN
Status: DISCONTINUED | OUTPATIENT
Start: 2022-12-18 | End: 2022-12-21 | Stop reason: HOSPADM

## 2022-12-18 RX ORDER — ONDANSETRON 2 MG/ML
4 INJECTION INTRAMUSCULAR; INTRAVENOUS ONCE
Status: COMPLETED | OUTPATIENT
Start: 2022-12-18 | End: 2022-12-18

## 2022-12-18 RX ORDER — ONDANSETRON 4 MG/1
4 TABLET, ORALLY DISINTEGRATING ORAL EVERY 6 HOURS PRN
Status: DISCONTINUED | OUTPATIENT
Start: 2022-12-18 | End: 2022-12-21 | Stop reason: HOSPADM

## 2022-12-18 RX ORDER — OMEPRAZOLE 20 MG/1
40 CAPSULE, DELAYED RELEASE ORAL
Status: DISCONTINUED | OUTPATIENT
Start: 2022-12-19 | End: 2022-12-21 | Stop reason: HOSPADM

## 2022-12-18 RX ORDER — CARVEDILOL 12.5 MG/1
12.5 TABLET ORAL 2 TIMES DAILY WITH MEALS
Status: DISCONTINUED | OUTPATIENT
Start: 2022-12-19 | End: 2022-12-21 | Stop reason: HOSPADM

## 2022-12-18 RX ORDER — MORPHINE SULFATE 4 MG/ML
4 INJECTION INTRAVENOUS
Status: DISCONTINUED | OUTPATIENT
Start: 2022-12-18 | End: 2022-12-21 | Stop reason: HOSPADM

## 2022-12-18 RX ORDER — AMOXICILLIN 250 MG
2 CAPSULE ORAL 2 TIMES DAILY
Status: DISCONTINUED | OUTPATIENT
Start: 2022-12-18 | End: 2022-12-21 | Stop reason: HOSPADM

## 2022-12-18 RX ORDER — BISACODYL 10 MG
10 SUPPOSITORY, RECTAL RECTAL
Status: DISCONTINUED | OUTPATIENT
Start: 2022-12-18 | End: 2022-12-21 | Stop reason: HOSPADM

## 2022-12-18 RX ADMIN — ONDANSETRON 4 MG: 2 INJECTION INTRAMUSCULAR; INTRAVENOUS at 20:38

## 2022-12-18 RX ADMIN — MORPHINE SULFATE 4 MG: 4 INJECTION INTRAVENOUS at 20:38

## 2022-12-18 RX ADMIN — MORPHINE SULFATE 4 MG: 4 INJECTION INTRAVENOUS at 22:04

## 2022-12-18 ASSESSMENT — FIBROSIS 4 INDEX: FIB4 SCORE: 1.35

## 2022-12-19 PROBLEM — T40.2X5A CONSTIPATION DUE TO OPIOID THERAPY: Status: ACTIVE | Noted: 2022-12-19

## 2022-12-19 PROBLEM — K59.03 CONSTIPATION DUE TO OPIOID THERAPY: Status: ACTIVE | Noted: 2022-12-19

## 2022-12-19 LAB
ANION GAP SERPL CALC-SCNC: 10 MMOL/L (ref 7–16)
BUN SERPL-MCNC: 17 MG/DL (ref 8–22)
CALCIUM SERPL-MCNC: 8.8 MG/DL (ref 8.5–10.5)
CHLORIDE SERPL-SCNC: 99 MMOL/L (ref 96–112)
CO2 SERPL-SCNC: 28 MMOL/L (ref 20–33)
CREAT SERPL-MCNC: 0.68 MG/DL (ref 0.5–1.4)
ERYTHROCYTE [DISTWIDTH] IN BLOOD BY AUTOMATED COUNT: 38.9 FL (ref 35.9–50)
GFR SERPLBLD CREATININE-BSD FMLA CKD-EPI: 97 ML/MIN/1.73 M 2
GLUCOSE SERPL-MCNC: 110 MG/DL (ref 65–99)
HCT VFR BLD AUTO: 40 % (ref 42–52)
HGB BLD-MCNC: 13.8 G/DL (ref 14–18)
MCH RBC QN AUTO: 29.8 PG (ref 27–33)
MCHC RBC AUTO-ENTMCNC: 34.5 G/DL (ref 33.7–35.3)
MCV RBC AUTO: 86.4 FL (ref 81.4–97.8)
PLATELET # BLD AUTO: 229 K/UL (ref 164–446)
PMV BLD AUTO: 9.3 FL (ref 9–12.9)
POTASSIUM SERPL-SCNC: 3.9 MMOL/L (ref 3.6–5.5)
RBC # BLD AUTO: 4.63 M/UL (ref 4.7–6.1)
SODIUM SERPL-SCNC: 137 MMOL/L (ref 135–145)
WBC # BLD AUTO: 9.6 K/UL (ref 4.8–10.8)

## 2022-12-19 PROCEDURE — 97162 PT EVAL MOD COMPLEX 30 MIN: CPT

## 2022-12-19 PROCEDURE — G0378 HOSPITAL OBSERVATION PER HR: HCPCS

## 2022-12-19 PROCEDURE — 700102 HCHG RX REV CODE 250 W/ 637 OVERRIDE(OP)

## 2022-12-19 PROCEDURE — 80048 BASIC METABOLIC PNL TOTAL CA: CPT

## 2022-12-19 PROCEDURE — 85027 COMPLETE CBC AUTOMATED: CPT

## 2022-12-19 PROCEDURE — 97166 OT EVAL MOD COMPLEX 45 MIN: CPT

## 2022-12-19 PROCEDURE — A9270 NON-COVERED ITEM OR SERVICE: HCPCS

## 2022-12-19 PROCEDURE — 97535 SELF CARE MNGMENT TRAINING: CPT

## 2022-12-19 PROCEDURE — 99225 PR SUBSEQUENT OBSERVATION CARE,LEVEL II: CPT | Performed by: NURSE PRACTITIONER

## 2022-12-19 PROCEDURE — 96376 TX/PRO/DX INJ SAME DRUG ADON: CPT

## 2022-12-19 PROCEDURE — 700111 HCHG RX REV CODE 636 W/ 250 OVERRIDE (IP)

## 2022-12-19 PROCEDURE — 700101 HCHG RX REV CODE 250

## 2022-12-19 RX ADMIN — LEVOTHYROXINE SODIUM 125 MCG: 0.12 TABLET ORAL at 05:30

## 2022-12-19 RX ADMIN — SENNOSIDES AND DOCUSATE SODIUM 2 TABLET: 50; 8.6 TABLET ORAL at 18:45

## 2022-12-19 RX ADMIN — BACLOFEN 10 MG: 10 TABLET ORAL at 23:57

## 2022-12-19 RX ADMIN — ROSUVASTATIN CALCIUM 20 MG: 20 TABLET, FILM COATED ORAL at 18:44

## 2022-12-19 RX ADMIN — RIVAROXABAN 10 MG: 10 TABLET, FILM COATED ORAL at 18:44

## 2022-12-19 RX ADMIN — LIDOCAINE 1 PATCH: 50 PATCH TOPICAL at 01:35

## 2022-12-19 RX ADMIN — LOSARTAN POTASSIUM 100 MG: 50 TABLET, FILM COATED ORAL at 05:29

## 2022-12-19 RX ADMIN — ACETAMINOPHEN 650 MG: 325 TABLET, FILM COATED ORAL at 07:37

## 2022-12-19 RX ADMIN — CARVEDILOL 12.5 MG: 12.5 TABLET, FILM COATED ORAL at 18:48

## 2022-12-19 RX ADMIN — OXYCODONE HYDROCHLORIDE 10 MG: 10 TABLET ORAL at 23:56

## 2022-12-19 RX ADMIN — BACLOFEN 10 MG: 10 TABLET ORAL at 09:26

## 2022-12-19 RX ADMIN — OMEPRAZOLE 40 MG: 20 CAPSULE, DELAYED RELEASE ORAL at 05:30

## 2022-12-19 RX ADMIN — MORPHINE SULFATE 4 MG: 4 INJECTION INTRAVENOUS at 11:46

## 2022-12-19 RX ADMIN — POLYETHYLENE GLYCOL 3350 1 PACKET: 17 POWDER, FOR SOLUTION ORAL at 16:35

## 2022-12-19 RX ADMIN — AMLODIPINE BESYLATE 5 MG: 5 TABLET ORAL at 05:30

## 2022-12-19 RX ADMIN — OXYCODONE HYDROCHLORIDE 10 MG: 10 TABLET ORAL at 10:50

## 2022-12-19 RX ADMIN — SENNOSIDES AND DOCUSATE SODIUM 2 TABLET: 50; 8.6 TABLET ORAL at 05:30

## 2022-12-19 RX ADMIN — BACLOFEN 10 MG: 10 TABLET ORAL at 16:34

## 2022-12-19 RX ADMIN — ASPIRIN 81 MG: 81 TABLET, COATED ORAL at 05:29

## 2022-12-19 RX ADMIN — OXYCODONE HYDROCHLORIDE 10 MG: 10 TABLET ORAL at 16:34

## 2022-12-19 RX ADMIN — BACLOFEN 10 MG: 10 TABLET ORAL at 00:41

## 2022-12-19 ASSESSMENT — ENCOUNTER SYMPTOMS
NAUSEA: 1
NECK PAIN: 1
VOMITING: 0
ABDOMINAL PAIN: 0
DIZZINESS: 0
HEMOPTYSIS: 0
CONSTITUTIONAL NEGATIVE: 1
FEVER: 0
DOUBLE VISION: 0
SHORTNESS OF BREATH: 0
PHOTOPHOBIA: 0
HEADACHES: 0
STRIDOR: 0
PSYCHIATRIC NEGATIVE: 1
COUGH: 0
PALPITATIONS: 0
SORE THROAT: 0
EYES NEGATIVE: 1
BACK PAIN: 0
SENSORY CHANGE: 0
BRUISES/BLEEDS EASILY: 0
FALLS: 0
BLURRED VISION: 0
HALLUCINATIONS: 0
RESPIRATORY NEGATIVE: 1
NEUROLOGICAL NEGATIVE: 1
SPEECH CHANGE: 0
CHILLS: 0
FOCAL WEAKNESS: 0
BLOOD IN STOOL: 0
CARDIOVASCULAR NEGATIVE: 1
NERVOUS/ANXIOUS: 0
WEAKNESS: 0

## 2022-12-19 ASSESSMENT — COGNITIVE AND FUNCTIONAL STATUS - GENERAL
MOBILITY SCORE: 14
DAILY ACTIVITIY SCORE: 16
MOVING FROM LYING ON BACK TO SITTING ON SIDE OF FLAT BED: A LOT
MOVING TO AND FROM BED TO CHAIR: A LOT
WALKING IN HOSPITAL ROOM: A LITTLE
SUGGESTED CMS G CODE MODIFIER DAILY ACTIVITY: CK
STANDING UP FROM CHAIR USING ARMS: A LITTLE
SUGGESTED CMS G CODE MODIFIER MOBILITY: CL
TOILETING: A LOT
TURNING FROM BACK TO SIDE WHILE IN FLAT BAD: A LOT
DRESSING REGULAR UPPER BODY CLOTHING: A LITTLE
PERSONAL GROOMING: A LITTLE
CLIMB 3 TO 5 STEPS WITH RAILING: A LOT
HELP NEEDED FOR BATHING: A LOT
DRESSING REGULAR LOWER BODY CLOTHING: A LOT

## 2022-12-19 ASSESSMENT — PATIENT HEALTH QUESTIONNAIRE - PHQ9
1. LITTLE INTEREST OR PLEASURE IN DOING THINGS: NOT AT ALL
2. FEELING DOWN, DEPRESSED, IRRITABLE, OR HOPELESS: NOT AT ALL
SUM OF ALL RESPONSES TO PHQ9 QUESTIONS 1 AND 2: 0

## 2022-12-19 ASSESSMENT — GAIT ASSESSMENTS
GAIT LEVEL OF ASSIST: CONTACT GUARD ASSIST
DEVIATION: BRADYKINETIC;SHUFFLED GAIT
ASSISTIVE DEVICE: FRONT WHEEL WALKER
DISTANCE (FEET): 60

## 2022-12-19 ASSESSMENT — LIFESTYLE VARIABLES
EVER HAD A DRINK FIRST THING IN THE MORNING TO STEADY YOUR NERVES TO GET RID OF A HANGOVER: NO
HAVE PEOPLE ANNOYED YOU BY CRITICIZING YOUR DRINKING: NO
EVER FELT BAD OR GUILTY ABOUT YOUR DRINKING: NO
TOTAL SCORE: 0
TOTAL SCORE: 0
ALCOHOL_USE: YES
ON A TYPICAL DAY WHEN YOU DRINK ALCOHOL HOW MANY DRINKS DO YOU HAVE: 4
AVERAGE NUMBER OF DAYS PER WEEK YOU HAVE A DRINK CONTAINING ALCOHOL: 2
TOTAL SCORE: 0
HOW MANY TIMES IN THE PAST YEAR HAVE YOU HAD 5 OR MORE DRINKS IN A DAY: 0
HAVE YOU EVER FELT YOU SHOULD CUT DOWN ON YOUR DRINKING: NO
CONSUMPTION TOTAL: NEGATIVE

## 2022-12-19 ASSESSMENT — PAIN DESCRIPTION - PAIN TYPE
TYPE: ACUTE PAIN
TYPE: DEEP SOMATIC PAIN

## 2022-12-19 ASSESSMENT — ACTIVITIES OF DAILY LIVING (ADL): TOILETING: INDEPENDENT

## 2022-12-19 ASSESSMENT — FIBROSIS 4 INDEX: FIB4 SCORE: 1.28

## 2022-12-19 NOTE — ASSESSMENT & PLAN NOTE
Chronic  /99 on admission however uncontrolled pain could be contributory   Pressure stabilized  -Continue amlodipine, coreg, losartan   -Pain control  -PRN hydralazine IV

## 2022-12-19 NOTE — ED NOTES
Called lab to check on the status of the CRP and the BMP. Lab states that their chem line is down and results will be delayed as they need to be completed manually

## 2022-12-19 NOTE — ASSESSMENT & PLAN NOTE
Secondary to C3-C5 laminectomy with Dr. Wright on 12/12/2022  -Multimodal pain management  -PT OT eval  -Able to move minimally even with pain meds before PT OT eval  -Denies pain at rest -with movement/ambulation  -Added baclofen, tizanidine, and Flexeril per recommendations from neurosurgery

## 2022-12-19 NOTE — PROGRESS NOTES
[facility] Medicine Daily Progress Note    Date of Service  12/19/2022    Chief Complaint  Rodney Arias is a 72 y.o. male admitted 12/18/2022 with intractable neck pain status postlaminectomy/fusion    Hospital Course  Rodney Arias is a 72 y.o. male with past medical history of CAD status post stent, hypertension, hyperlipidemia, alcohol use, GERD, who presented to the hospital on 12/18/2022 with acute neck pain.  The patient underwent C3-C7 laminectomy fusion secondary to intramural compressive lesion with Dr. Wright on 12/12/2022 and was discharged from the hospital on 12/15.       History was obtained from the patient and his wife who is at bedside.  He reports that he was doing well the day of discharge however began having significant neck pain yesterday prompting return to the ER today.  He denies numbness, tingling or weakness.  He reports that he has been following C-spine precautions as set by neurosurgery at time of discharge. He denies new fall or trauma precipitating pain.      Upon presentation to the ER, CT cervical spine without contrast was obtained demonstrating no acute traumatic bony injury with lucency adjacent to the transpedicular screws at C3-C4 which could be related to recent postop changes or new component of hardware loosening.  ERP consulted surgery, Dr. Wright.  It was decided that due to the patient's intractable pain, the patient would be admitted to the hospital for pain control and further work with PT/OT.  Neurosurgery will follow.  Laboratory evaluation was significant for WBC of 8.8, hemoglobin of 14, mildly elevated ESR of 26, stable electrolytes, and normal renal function.     Interval Problem Update  Neuro surgery on board in ED and did not recommend any acute intervention at this time  Patient denies any pain at rest pain only with movement ambulation  Bowel regimen for constipation    I have discussed this patient's plan of care and discharge plan at IDT rounds  today with Case Management, Nursing, Nursing leadership, and other members of the IDT team.    Consultants/Specialty  neurosurgery    Code Status  Full Code    Disposition  Patient is not medically cleared for discharge.   Anticipate discharge to to home with close outpatient follow-up.  I have placed the appropriate orders for post-discharge needs.    Review of Systems  Review of Systems   Constitutional: Negative.    HENT: Negative.     Eyes: Negative.    Respiratory: Negative.     Cardiovascular: Negative.    Gastrointestinal:  Positive for nausea.   Genitourinary: Negative.    Musculoskeletal:  Positive for neck pain.   Skin: Negative.    Neurological: Negative.    Endo/Heme/Allergies: Negative.    Psychiatric/Behavioral: Negative.        Physical Exam  Temp:  [36.4 °C (97.5 °F)-36.9 °C (98.5 °F)] 36.6 °C (97.8 °F)  Pulse:  [70-86] 70  Resp:  [12-17] 16  BP: (114-186)/(69-99) 114/69  SpO2:  [90 %-95 %] 95 %    Physical Exam  Constitutional:       Appearance: Normal appearance.   HENT:      Head: Normocephalic and atraumatic.   Eyes:      Extraocular Movements: Extraocular movements intact.      Pupils: Pupils are equal, round, and reactive to light.   Cardiovascular:      Rate and Rhythm: Normal rate and regular rhythm.      Pulses: Normal pulses.      Heart sounds: Normal heart sounds.   Pulmonary:      Effort: Pulmonary effort is normal.      Breath sounds: Normal breath sounds. No wheezing, rhonchi or rales.   Abdominal:      General: Abdomen is flat. Bowel sounds are normal.      Tenderness: There is no guarding or rebound.   Musculoskeletal:         General: Normal range of motion.      Cervical back: Normal range of motion. Pain with movement and muscular tenderness present.   Skin:     General: Skin is warm and dry.   Neurological:      General: No focal deficit present.      Mental Status: He is alert and oriented to person, place, and time.   Psychiatric:         Mood and Affect: Mood normal.          Behavior: Behavior normal.       Fluids  No intake or output data in the 24 hours ending 12/19/22 1309    Laboratory  Recent Labs     12/18/22  1905 12/19/22  0309   WBC 8.8 9.6   RBC 4.89 4.63*   HEMOGLOBIN 14.6 13.8*   HEMATOCRIT 42.0 40.0*   MCV 85.9 86.4   MCH 29.9 29.8   MCHC 34.8 34.5   RDW 37.9 38.9   PLATELETCT 224 229   MPV 9.4 9.3     Recent Labs     12/18/22  1905 12/19/22  0309   SODIUM 134* 137   POTASSIUM 3.5* 3.9   CHLORIDE 97 99   CO2 25 28   GLUCOSE 115* 110*   BUN 15 17   CREATININE 0.78 0.68   CALCIUM 9.0 8.8                   Imaging  CT-CSPINE WITHOUT PLUS RECONS   Final Result         1.  No acute traumatic bony injury of the cervical spine is apparent.   2.  Lucency adjacent to the transpedicular screws at C3 and C4, could be related to recent postop changes or related to component of hardware loosening.   3.  Air and interspersed fluid in the soft tissues of the surgical bed from spinal fusion, within expected limits for recent postop history.   4.  Mild bilateral neural foraminal stenosis   5.  Atherosclerosis           Assessment/Plan  * Intractable pain- (present on admission)  Assessment & Plan  Secondary to C3-C5 laminectomy with Dr. Wright on 12/12/2022  -Multimodal pain management  -PT OT eval  Able to move minimally even with pain meds before PT OT eval  Denies pain at rest -with movement/ambulation    Constipation due to opioid therapy  Assessment & Plan  Has not had bowel movement in 5 days  Encourage bowel regimen  Can order Rellastor if standard bowel regimen is ineffective    Status post laminectomy- (present on admission)  Assessment & Plan  Status post C3-C7 laminectomy with fusion 2/2 cervical cyst causing cord compression with Dr. Wright on 12/12/22   Patient was discharged from the hospital on 12/15 was doing well until he developed severe neck pain today  CT C-spine without on admission demonstrates:  no acute traumatic bony injury of the cervical spine is apparent, lucency  "adjacent to the transpedicular screws at C3 and C4, could be related to recent postop changes or related to component of hardware loosening.  No numbness/tingling or weakness  -ERP consulted NSG, Dr. Wright. It was decided to admit pain for pain control and continued work with PT/OT   -Neurosurgery to follow  -Pain control as above in \"intractable pain\"   -PT OT ordered  -Continue cervical spine precautions with c-collar when out of bed, head of bed flat/no pillows, no lifting more than 10 lbs    CAD (coronary artery disease)- (present on admission)  Assessment & Plan  History of MI status post stent in 1998  Denies chest pain on admission  -Continue statin and aspirin    Hypothyroidism (acquired)- (present on admission)  Assessment & Plan  Chronic  -Continue levothyroxine    Alcohol use- (present on admission)  Assessment & Plan  Patient reports that he drinks 6 beers daily  Denies history of alcohol withdrawal  - cessation  -Monitor for alcohol withdrawal and initiate CIWA if needed    Obstructive sleep apnea- (present on admission)  Assessment & Plan  Hx of, does not wear home CPAP    GERD (gastroesophageal reflux disease)- (present on admission)  Assessment & Plan  Chronic   -continue omeprazole    Dyslipidemia- (present on admission)  Assessment & Plan  Chronic  -Continue statin    Essential hypertension- (present on admission)  Assessment & Plan  Chronic  /99 on admission however uncontrolled pain could be contributory   -Continue amlodipine, coreg, losartan   -Pain control  -PRN hydralazine IV            VTE prophylaxis: Xarelto 10 mg daily as prophylaxis    I have performed a physical exam and reviewed and updated ROS and Plan today (12/19/2022). In review of yesterday's note (12/18/2022), there are no changes except as documented above.        "

## 2022-12-19 NOTE — ASSESSMENT & PLAN NOTE
"-Status post C3-C7 laminectomy with fusion 2/2 cervical cyst causing cord compression with Dr. Wright on 12/12/22   -Patient was discharged from the hospital on 12/15 was doing well until he developed severe neck pain today  -CT C-spine without on admission demonstrates:  no acute traumatic bony injury of the cervical spine is apparent, lucency adjacent to the transpedicular screws at C3 and C4, could be related to recent postop changes or related to component of hardware loosening.  -No numbness/tingling or weakness  -ERP consulted NSG, Dr. Wright. It was decided to admit pain for pain control and continued work with PT/OT   -Neurosurgery to follow  -Pain control as above in \"intractable pain\"   -PT OT ordered  -Continue cervical spine precautions with c-collar when out of bed, head of bed flat/no pillows, no lifting more than 10 lbs  -Dr. Wright saw patient on 12/20; no surgical intervention at this time; continue pain management  "

## 2022-12-19 NOTE — ASSESSMENT & PLAN NOTE
Patient reports that he drinks 6 beers daily  Denies history of alcohol withdrawal  - cessation  -Monitor for alcohol withdrawal and initiate CIWA if needed

## 2022-12-19 NOTE — ED PROVIDER NOTES
ER Provider Note    Scribed for Kirill Anderson. 12/18/2022  7:11 PM    Primary Care Provider: Sultan KEARA Mars M.D.  Means of Arrival: EMS  History obtained from: patient    CHIEF COMPLAINT  Chief Complaint   Patient presents with    Back Pain     Yesterday, pt started having severe, uncontrollable pain across his upper back.      LIMITATION TO HISTORY   Select: : None    HPI  Rodney Arias is a 72 y.o. male who presents to the ED complaining of neck pain onset yesterday. Patient recently had C3-C7 laminectomies with fusion last week on 12/12 and was discharged on 12/15. He started feeling significant pain in neck and back about two days later on 12/17. Pain is all around neck and upper back. Pain radiates circumferentially. Discomfort is worsening. No fever, chills    OUTSIDE HISTORIAN(S):  Select: none    EXTERNAL RECORDS REVIEWED  Select: Outpatient Notes brooketent had laminectomy on 12/12 with FUP on 12/15      REVIEW OF SYSTEMS  Pertinent positives include neck pain, back pain.   Pertinent negatives include no fever.    All other systems reviewed and negative.     PAST MEDICAL HISTORY  Past Medical History:   Diagnosis Date    Arthritis     neck    Bronchitis     CAD (coronary artery disease) 10/21/2011    Disorder of thyroid     Dyslipidemia 10/21/2011    High cholesterol     HTN (hypertension) 10/21/2011    Indigestion     MI (myocardial infarction) (HCC) 10/21/2011    Myocardial infarction (HCC)     with stent placement    Pain     neck and shoulder    Sleep apnea     Stented coronary artery 10/21/2011       SURGICAL HISTORY  Past Surgical History:   Procedure Laterality Date    CERVICAL FUSION POSTERIOR N/A 12/12/2022    Procedure: CERVICAL 3-7 LAMINOTOMY WITH RESECTION OF INTRADURAL TUMOR AND CERVICAL 3-7 FUSION;  Surgeon: Alfonzo Wright M.D.;  Location: SURGERY [facility];  Service: Neurosurgery    LAMINOTOMY N/A 12/12/2022    Procedure: LAMINOTOMY;  Surgeon: Alfonzo Wright  M.D.;  Location: SURGERY [facility];  Service: Neurosurgery    SPINAL CORD TUMOR N/A 12/12/2022    Procedure: EXCISION, NEOPLASM, SPINAL CORD;  Surgeon: Alfonzo Wright M.D.;  Location: SURGERY [facility];  Service: Neurosurgery    ANGIOPLASTY      ZZZ CARDIAC CATH         FAMILY HISTORY  Family History   Problem Relation Age of Onset    Heart Disease Neg Hx         no premature CAD       SOCIAL HISTORY   reports that he has never smoked. He has never been exposed to tobacco smoke. He has never used smokeless tobacco. He reports current alcohol use of about 12.6 oz per week. He reports current drug use.    CURRENT MEDICATIONS  Previous Medications    AMLODIPINE (NORVASC) 5 MG TAB    Take 1 Tablet by mouth every day.    ASPIRIN 81 MG EC TABLET    Take 1 Tablet by mouth every day.    BACLOFEN (LIORESAL) 10 MG TAB    Take 10 mg by mouth 3 times a day. Indications: Muscle Spasm    CARVEDILOL (COREG) 12.5 MG TAB    Take 1 Tablet by mouth 2 times a day with meals.    IBUPROFEN (MOTRIN) 200 MG TAB    Take 400 mg by mouth every 6 hours as needed for Mild Pain or Moderate Pain.    LEVOTHYROXINE (SYNTHROID) 125 MCG TAB    TAKE 1 TABLET BY MOUTH EVERY MORNING ON AN EMPTY STOMACH    LOSARTAN (COZAAR) 100 MG TAB    TAKE 1 TABLET BY MOUTH EVERY DAY    OMEPRAZOLE (PRILOSEC) 40 MG DELAYED-RELEASE CAPSULE    Take 1 Capsule by mouth every day.    ONDANSETRON (ZOFRAN ODT) 4 MG TABLET DISPERSIBLE    Take 1 Tablet by mouth every 8 hours as needed for Nausea/Vomiting.    OXYCODONE-ACETAMINOPHEN (PERCOCET) 5-325 MG TAB    Take 1 Tablet by mouth every four hours as needed for Moderate Pain for up to 7 days.    POTASSIUM PO    Take 1 Tablet by mouth every morning. Indications: OTC Supplement    PREDNISONE (DELTASONE) 50 MG TAB    Take 50 mg by mouth every day. Pt started on 11/13/2022 for 5 day course    ROSUVASTATIN (CRESTOR) 20 MG TAB    Take 1 Tablet by mouth every evening.       ALLERGIES  Shrimp (diagnostic)    PHYSICAL EXAM  VITAL  "SIGNS: BP (!) 186/99   Pulse 80   Temp 36.9 °C (98.5 °F) (Temporal)   Resp 16   Ht 1.803 m (5' 11\")   Wt 95.3 kg (210 lb)   SpO2 92%   BMI 29.29 kg/m²     Constitutional: Alert, moderate distress  HENT: No signs of trauma, Bilateral external ears normal, Nose normal.   Eyes: Pupils are equal and reactive, Conjunctiva normal, Non-icteric.   Neck: Normal range of motion, Supple, No stridor. Neck wounds clean dry and intact  Lymphatic: No lymphadenopathy noted.   Cardiovascular: Regular rate and rhythm, no murmurs.   Thorax & Lungs: Normal breath sounds, No respiratory distress, No wheezing, No chest tenderness.   Abdomen: Bowel sounds normal, Soft, No tenderness, No masses, No pulsatile masses. No peritoneal signs.  Skin: Warm, Dry, Neck wounds clean dry and intact   Back: No bony tenderness, No CVA tenderness.   Extremities: Intact distal pulses, No edema, No cyanosis. Upper and lower extremities sensation intact throughout  Musculoskeletal: Good range of motion in all major joints. No tenderness to palpation or major deformities noted.  Upper and lower extremities sensation intact throughout  Neurologic: Alert , Normal motor function, Normal sensory function, No focal deficits noted.   Psychiatric: Affect normal, Judgment normal, Mood normal.     VITAL SIGNS:   BP (!) 186/99   Pulse 80   Temp 36.9 °C (98.5 °F) (Temporal)   Resp 16   Ht 1.803 m (5' 11\")   Wt 95.3 kg (210 lb)   SpO2 92%   BMI 29.29 kg/m²       DIAGNOSTIC STUDIES  Labs:   Labs Reviewed   CBC WITH DIFFERENTIAL - Abnormal; Notable for the following components:       Result Value    Lymphocytes 21.90 (*)     Monos (Absolute) 0.91 (*)     All other components within normal limits   BASIC METABOLIC PANEL - Abnormal; Notable for the following components:    Sodium 134 (*)     Potassium 3.5 (*)     Glucose 115 (*)     All other components within normal limits   SED RATE - Abnormal; Notable for the following components:    Sed Rate Westergren 26 " (*)     All other components within normal limits   CRP QUANTITIVE (NON-CARDIAC) - Abnormal; Notable for the following components:    Stat C-Reactive Protein 2.09 (*)     All other components within normal limits   ESTIMATED GFR   All labs reviewed by me.    Radiology:   CT-CSPINE WITHOUT PLUS RECONS   Final Result         1.  No acute traumatic bony injury of the cervical spine is apparent.   2.  Lucency adjacent to the transpedicular screws at C3 and C4, could be related to recent postop changes or related to component of hardware loosening.   3.  Air and interspersed fluid in the soft tissues of the surgical bed from spinal fusion, within expected limits for recent postop history.   4.  Mild bilateral neural foraminal stenosis   5.  Atherosclerosis        The radiologist's interpretation of all radiological studies have been reviewed by me.    COURSE & MEDICAL DECISION MAKING     Nursing notes, vital signs, PMSFSHx reviewed in chart     Differential diagnoses include but not limited to post operative pain, post operative complications, less likely nerve damage    Prior records reviewed which indicate     DISCUSSION OF MANAGEMENT WITH OTHER PHYSICIANS, QHP OR APPROPRIATE SOURCE  Select: Neuro: advised to stay on managed pain routine    INDEPENDENT INTERPRETATION OF STUDIES  Select: CT scan(s) shows what appears to be normal postoperative changes    ESCALATION OF CARE  Patient will need to be admitted    SOCIAL DETERMINANTS THAT SIGNIFICANTLY AFFECT CARE  Select: None    PRESCRIPTION DRUG CONSIDERED  Select: Pain Medications morphine        PLAN   7:11 PM Patient was treated with 4mg morphine, 4mg zofran for his symptoms. Will order cspine CT, cbc with differential, comp metabolic panel, and other labs to evaluate his complaints.    9:15 PM Paged for Alfonzo Wright.    9:26 PM Consulted with Dr. Wright, neuro, about the patient's case.    9:34 PM Patient reevaluated at bedside. Patient resting. Discussed conversation  with Dr Wright.     10:16 PM Patient reevaluated at bedside. Patient unable to ambulate without severe pain.    10:18 PM Paged for hospitalist.    COURSE  Patient continues to have significant pain.  He is required multiple doses of pain medication.  His CT does not show any evidence of issue that is not likely related to postoperative changes.  There is a mildly elevated ESR and CRP however this is not significantly elevated enough to be concerned about spinal infection.  Neurosurgery was consulted and does not feel this is infection.  Given the fact the patient is not able to tolerate going home given the pain he will be admitted to the hospitalist service.    DISPOSITION   Patient will be hospitalized by Dr. Weber    FINAL IMPRESSION   1. Neck pain        The note accurately reflects work and decisions made by me.  Kirill Jessica M.D.  12/19/2022  1:41 AM

## 2022-12-19 NOTE — ED NOTES
"Med rec completed historically:     \"Med rec updated and complete, per pt   Allergies reviewed, per pt  Interviewed pt with wife at bedside with permission from pt.  Pts wife's had a list of medications that she read to this writer  \"    "

## 2022-12-19 NOTE — ASSESSMENT & PLAN NOTE
Has not had bowel movement in 5 days  Encourage bowel regimen  Can order Rellastor if standard bowel regimen is ineffective

## 2022-12-19 NOTE — ASSESSMENT & PLAN NOTE
History of MI status post stent in 1998  Denies chest pain on admission  -Continue statin and aspirin

## 2022-12-19 NOTE — PROGRESS NOTES
4 Eyes Skin Assessment Completed by LUIS ENRIQUE Talavera and LUIS ENRIQUE Green.    Head WDL  Ears WDL  Nose WDL  Mouth WDL  Neck WDL  Breast/Chest WDL  Shoulder Blades WDL  Spine WDL  (R) Arm/Elbow/Hand WDL  (L) Arm/Elbow/Hand WDL  Abdomen WDL  Groin WDL  Scrotum/Coccyx/Buttocks WDL  (R) Leg WDL  (L) Leg WDL  (R) Heel/Foot/Toe WDL  (L) Heel/Foot/Toe WDL          Devices In Places Nasal Cannula Probe      Interventions In Place N/A    Possible Skin Injury No    Pictures Uploaded Into Epic N/A  Wound Consult Placed N/A  RN Wound Prevention Protocol Ordered No

## 2022-12-19 NOTE — ED NOTES
Pt requesting a room upstairs. Pt informed that we may not have one available for him tonight. Pt thanked for patience

## 2022-12-19 NOTE — ED NOTES
Report to LUIS ENRIQUE Gaitan. Pt awake and breathing with even, unlabored respirations on RA at time of transfer.

## 2022-12-19 NOTE — ED NOTES
Pt medicated per MAR. ED tech @ bedside for ambulation trial. Pt states that pain is 3/10 when still and 10/10 with movement

## 2022-12-19 NOTE — ED TRIAGE NOTES
"Chief Complaint   Patient presents with   • Back Pain     Yesterday, pt started having severe, uncontrollable pain across his upper back.      Pt BIB EMS from home with above complaint. Pt had C3-C7 laminectomies with fusion on 12/12 and was discharged 12/15. Pain was initially well controlled after discharge, but yesterday, pt started experiencing severe pain. Pt denies any fevers/chills at home. Pt denies numbness/tingling. Pt received 5 mg morphine PTA.     BP (!) 186/99   Pulse 80   Temp 36.9 °C (98.5 °F) (Temporal)   Resp 16   Ht 1.803 m (5' 11\")   Wt 95.3 kg (210 lb)   SpO2 92%   BMI 29.29 kg/m²     "

## 2022-12-19 NOTE — THERAPY
"Occupational Therapy   Initial Evaluation     Patient Name: Rodney Arias  Age:  72 y.o., Sex:  male  Medical Record #: [id number]  Today's Date: 12/19/2022    Precautions: Fall Risk, Cervical Collar  , Spinal / Back Precautions   Comments: Aspen collar on EOB    Assessment  Patient is 72 y.o. male with a diagnosis of intractable neck pain.  Additional factors influencing patient status / progress: Pt has PMHx of CAD status post stent, hypertension, hyperlipidemia, alcohol use, GERD, who presented to the hospital on 12/18/2022 with acute neck pain.  The patient underwent C3-C7 laminectomy fusion on 12/12/22.   Pt reports he had to call [facility] 2 x since he went home due to uncontrolled pain & difficulty getting OOB.  Today pt was premedicated & still was unable to sit EOB more than 1 min after multiple attempts to get OOB.  Nsg administered IV morphine & pt was then able to get OOB & take some slow shuffled steps with FWW but had poor tolerance for ADL's & requested to go back to bed.  Pt also reported he has not had a BM since his sx on 12/12.  Pt's main issues appears to be his pain control.  Suspect once pt's pain is better he will be able to D/C home with his spouse.    Plan    Recommend Occupational Therapy 3 times per week until therapy goals are met for the following treatments:  Adaptive Equipment, Neuro Re-Education / Balance, Self Care/Activities of Daily Living, Therapeutic Activities, and Therapeutic Exercises.    DC Equipment Recommendations: Tub / Shower Seat  Discharge Recommendations: Recommend home health for continued occupational therapy services     Subjective    \"I just couldn't even get OOB at home the pain was too bad\"     Objective       12/19/22 1138   Prior Living Situation   Prior Services None   Housing / Facility 1 Story House   Steps Into Home 2   Bathroom Set up Bathtub / Shower Combination   Equipment Owned Single Point Cane   Lives with - Patient's Self Care Capacity Spouse "   Comments Pt reports prior to his sx he was independent, however the pain was so severe he could not get OOB   Prior Level of IADL Function   Comments Pt has required assist since his sx last week   Pain 0 - 10 Group   Therapist Pain Assessment During Activity;Nurse Notified;8  (Pt was premedicated but did require Morphine to get OOB, poor tolerance due to pain)   Cognition    Comments pt was pleasant & co-operative but limited by sharp pain with movement   Neurological Concerns   Standing Posture During ADL's Posterior Lean   Balance Assessment   Sitting Balance (Static) Fair   Sitting Balance (Dynamic) Fair -   Standing Balance (Static) Fair -   Standing Balance (Dynamic) Poor +   Weight Shift Sitting Fair   Weight Shift Standing Fair   Comments pt with short shuffled steps, mobility limited by pain   Bed Mobility    Supine to Sit Moderate Assist   Sit to Supine Moderate Assist   Scooting Standby Assist   Rolling Supervised   ADL Assessment   Eating Modified Independent   Grooming Minimal Assist;Seated   Upper Body Dressing Minimal Assist   Lower Body Dressing Moderate Assist   Toileting Minimal Assist   Functional Mobility   Sit to Stand Contact Guard Assist   Bed, Chair, Wheelchair Transfer Contact Guard Assist   Toilet Transfers Minimal Assist   Activity Tolerance   Comments C-collar donned EOB in sitting   Patient / Family Goals   Patient / Family Goal #1 To have less pain   Short Term Goals   Short Term Goal # 1 Pt will be supervised with ADL transfers   Short Term Goal # 2 Pt will groom standing at the sink with supervision   Short Term Goal # 3 Pt will dress LB with supervision   Short Term Goal # 4 Pt will shower seated with supervision

## 2022-12-19 NOTE — H&P
Hospital Medicine History & Physical Note    Date of Service  12/19/2022    Primary Care Physician  Sultan KEARA Mars M.D.    Consultants  neurosurgery    Specialist Names: Dr. Wright    Code Status  Full Code (confirmed with patient)    Chief Complaint  Chief Complaint   Patient presents with    Back Pain     Yesterday, pt started having severe, uncontrollable pain across his upper back.        History of Presenting Illness  Rodney Arias is a 72 y.o. male with past medical history of CAD status post stent, hypertension, hyperlipidemia, alcohol use, GERD, who presented to the hospital on 12/18/2022 with acute neck pain.  The patient underwent C3-C7 laminectomy fusion secondary to intramural compressive lesion with Dr. Wright on 12/12/2022 and was discharged from the hospital on 12/15.      History was obtained from the patient and his wife who is at bedside.  He reports that he was doing well the day of discharge however began having significant neck pain yesterday prompting return to the ER today.  He denies numbness, tingling or weakness.  He reports that he has been following C-spine precautions as set by neurosurgery at time of discharge. He denies new fall or trauma precipitating pain.     Upon presentation to the ER, CT cervical spine without contrast was obtained demonstrating no acute traumatic bony injury with lucency adjacent to the transpedicular screws at C3-C4 which could be related to recent postop changes or new component of hardware loosening.  ERP consulted surgery, Dr. Wright.  It was decided that due to the patient's intractable pain, the patient would be admitted to the hospital for pain control and further work with PT/OT.  Neurosurgery will follow.  Laboratory evaluation was significant for WBC of 8.8, hemoglobin of 14, mildly elevated ESR of 26, stable electrolytes, and normal renal function.     I discussed the plan of care with patient, family, bedside RN, and my collaborating physician  .    Review of Systems  Review of Systems   Constitutional:  Negative for chills, fever and malaise/fatigue.   HENT:  Negative for congestion, nosebleeds and sore throat.    Eyes:  Negative for blurred vision, double vision and photophobia.   Respiratory:  Negative for cough, hemoptysis, shortness of breath and stridor.    Cardiovascular:  Negative for chest pain, palpitations and leg swelling.   Gastrointestinal:  Positive for nausea. Negative for abdominal pain, blood in stool and vomiting.   Genitourinary:  Negative for dysuria, hematuria and urgency.   Musculoskeletal:  Positive for neck pain. Negative for back pain, falls and joint pain.   Skin:  Negative for itching and rash.   Neurological:  Negative for dizziness, sensory change, speech change, focal weakness, weakness and headaches.   Endo/Heme/Allergies:  Negative for environmental allergies. Does not bruise/bleed easily.   Psychiatric/Behavioral:  Negative for hallucinations and suicidal ideas. The patient is not nervous/anxious.      Past Medical History   has a past medical history of Arthritis, Bronchitis, CAD (coronary artery disease) (10/21/2011), Disorder of thyroid, Dyslipidemia (10/21/2011), High cholesterol, HTN (hypertension) (10/21/2011), Indigestion, MI (myocardial infarction) (HCC) (10/21/2011), Myocardial infarction (HCC), Pain, Sleep apnea, and Stented coronary artery (10/21/2011).    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     Family History  family history is not on file.   Family history reviewed with patient. There is no family history that is pertinent to the chief complaint.     Social History   reports that he has never smoked. He has never been exposed to tobacco smoke. He has never used smokeless tobacco. He reports current alcohol use of about 12.6 oz per week. He  reports current drug use.    Allergies  Allergies   Allergen Reactions    Shrimp (Diagnostic)      Pt reports that he gets chest pain if he eats to much       Medications  Prior to Admission Medications   Prescriptions Last Dose Informant Patient Reported? Taking?   POTASSIUM PO UNKN at UNKN Historical Yes No   Sig: Take 1 Tablet by mouth every morning. Indications: OTC Supplement   amLODIPine (NORVASC) 5 MG Tab UNKN at UNKN Historical No No   Sig: Take 1 Tablet by mouth every day.   aspirin 81 MG EC tablet UNKN at UNKN Historical Yes No   Sig: Take 1 Tablet by mouth every day.   baclofen (LIORESAL) 10 MG Tab UNKN at UNKN Historical Yes No   Sig: Take 10 mg by mouth 3 times a day. Indications: Muscle Spasm   carvedilol (COREG) 12.5 MG Tab UNKN at UNKN Historical Yes No   Sig: Take 1 Tablet by mouth 2 times a day with meals.   ibuprofen (MOTRIN) 200 MG Tab UNKN at UNKN Historical Yes No   Sig: Take 400 mg by mouth every 6 hours as needed for Mild Pain or Moderate Pain.   levothyroxine (SYNTHROID) 125 MCG Tab UNKN at UNKN Historical No No   Sig: TAKE 1 TABLET BY MOUTH EVERY MORNING ON AN EMPTY STOMACH   losartan (COZAAR) 100 MG Tab UNKN Historical No No   Sig: TAKE 1 TABLET BY MOUTH EVERY DAY   omeprazole (PRILOSEC) 40 MG delayed-release capsule UNKN at UNKN Historical No No   Sig: Take 1 Capsule by mouth every day.   ondansetron (ZOFRAN ODT) 4 MG TABLET DISPERSIBLE UNKN at UNKN Historical No No   Sig: Take 1 Tablet by mouth every 8 hours as needed for Nausea/Vomiting.   oxyCODONE-acetaminophen (PERCOCET) 5-325 MG Tab UNKN at UNKN Historical No No   Sig: Take 1 Tablet by mouth every four hours as needed for Moderate Pain for up to 7 days.   predniSONE (DELTASONE) 50 MG Tab UNKN at UNKN Historical Yes No   Sig: Take 50 mg by mouth every day. Pt started on 11/13/2022 for 5 day course   rosuvastatin (CRESTOR) 20 MG Tab UNKN at UNKN Historical No No   Sig: Take 1 Tablet by mouth every evening.      Facility-Administered  Medications: None       Physical Exam  Temp:  [36.9 °C (98.5 °F)] 36.9 °C (98.5 °F)  Pulse:  [73-86] 73  Resp:  [12-16] 12  BP: (130-186)/(73-99) 130/73  SpO2:  [90 %-94 %] 94 %  Blood Pressure : 130/73   Temperature: 36.9 °C (98.5 °F)   Pulse: 73   Respiration: 12   Pulse Oximetry: 94 %       Physical Exam  Constitutional:       General: He is not in acute distress.     Appearance: He is not ill-appearing or toxic-appearing.   HENT:      Mouth/Throat:      Mouth: Mucous membranes are moist.   Eyes:      Extraocular Movements: Extraocular movements intact.      Pupils: Pupils are equal, round, and reactive to light.   Neck:      Trachea: Trachea and phonation normal.      Comments: ROM not tested d/t c-spine precautions   Cardiovascular:      Rate and Rhythm: Normal rate and regular rhythm.      Pulses: Normal pulses.      Heart sounds: No murmur heard.    No friction rub. No gallop.   Pulmonary:      Effort: Pulmonary effort is normal. No respiratory distress.      Breath sounds: No stridor. No wheezing, rhonchi or rales.   Abdominal:      General: There is no distension.      Palpations: Abdomen is soft. There is no mass.      Tenderness: There is no abdominal tenderness. There is no guarding.   Musculoskeletal:      Cervical back: Tenderness present. No swelling, edema or deformity. Pain with movement and muscular tenderness present. No spinous process tenderness.      Right lower leg: No edema.      Left lower leg: No edema.      Comments: Mild cervical spine TTP   Strength 5/5 in all 4 extremities   Skin:     General: Skin is warm and dry.      Capillary Refill: Capillary refill takes less than 2 seconds.   Neurological:      General: No focal deficit present.      Mental Status: He is alert and oriented to person, place, and time. Mental status is at baseline.      GCS: GCS eye subscore is 4. GCS verbal subscore is 5. GCS motor subscore is 6.      Cranial Nerves: No cranial nerve deficit.      Sensory: No  sensory deficit.      Motor: No weakness, tremor, abnormal muscle tone or pronator drift.       Laboratory:  Recent Labs     12/18/22 1905   WBC 8.8   RBC 4.89   HEMOGLOBIN 14.6   HEMATOCRIT 42.0   MCV 85.9   MCH 29.9   MCHC 34.8   RDW 37.9   PLATELETCT 224   MPV 9.4     Recent Labs     12/18/22 1905   SODIUM 134*   POTASSIUM 3.5*   CHLORIDE 97   CO2 25   GLUCOSE 115*   BUN 15   CREATININE 0.78   CALCIUM 9.0     Recent Labs     12/18/22 1905   GLUCOSE 115*         No results for input(s): NTPROBNP in the last 72 hours.      No results for input(s): TROPONINT in the last 72 hours.    Imaging:  CT-CSPINE WITHOUT PLUS RECONS   Final Result         1.  No acute traumatic bony injury of the cervical spine is apparent.   2.  Lucency adjacent to the transpedicular screws at C3 and C4, could be related to recent postop changes or related to component of hardware loosening.   3.  Air and interspersed fluid in the soft tissues of the surgical bed from spinal fusion, within expected limits for recent postop history.   4.  Mild bilateral neural foraminal stenosis   5.  Atherosclerosis          X-Ray:  I have personally reviewed the images and compared with prior images.    Assessment/Plan:    * Intractable pain- (present on admission)  Assessment & Plan  Secondary to C3-C5 laminectomy with Dr. Wright on 12/12/2022  -Multimodal pain management  -PT OT eval    Status post laminectomy- (present on admission)  Assessment & Plan  Status post C3-C7 laminectomy with fusion 2/2 cervical cyst causing cord compression with Dr. Wright on 12/12/22   Patient was discharged from the hospital on 12/15 was doing well until he developed severe neck pain today  CT C-spine without on admission demonstrates:  no acute traumatic bony injury of the cervical spine is apparent, lucency adjacent to the transpedicular screws at C3 and C4, could be related to recent postop changes or related to component of hardware loosening.  No numbness/tingling or  "weakness  -ERP consulted [facility] [place], Dr. Wright. It was decided to admit pain for pain control and continued work with PT/OT   -Neurosurgery to follow  -Pain control as above in \"intractable pain\"   -PT OT ordered  -Continue cervical spine precautions with c-collar when out of bed, head of bed flat/no pillows, no lifting more than 10 lbs    CAD (coronary artery disease)- (present on admission)  Assessment & Plan  History of MI status post stent in 1998  Denies chest pain on admission  -Continue statin and aspirin    Hypothyroidism (acquired)- (present on admission)  Assessment & Plan  Chronic  -Continue levothyroxine    Alcohol use- (present on admission)  Assessment & Plan  Patient reports that he drinks 6 beers daily  Denies history of alcohol withdrawal  - cessation  -Monitor for alcohol withdrawal and initiate CIWA if needed    Obstructive sleep apnea- (present on admission)  Assessment & Plan  Hx of, does not wear home CPAP    GERD (gastroesophageal reflux disease)- (present on admission)  Assessment & Plan  Chronic   -continue omeprazole    Dyslipidemia- (present on admission)  Assessment & Plan  Chronic  -Continue statin    Essential hypertension- (present on admission)  Assessment & Plan  Chronic  /99 on admission however uncontrolled pain could be contributory   -Continue amlodipine, coreg, losartan   -Pain control  -PRN hydralazine IV         VTE prophylaxis: SCDs/TEDs and Xarelto 10 mg daily as prophylaxis  "

## 2022-12-19 NOTE — THERAPY
Physical Therapy   Initial Evaluation     Patient Name: Rodney Arias  Age:  72 y.o., Sex:  male  Medical Record #: [id number]  Today's Date: 12/19/2022     Precautions  Precautions: Fall Risk;Cervical Collar  ;Spinal / Back Precautions   Comments: Aspen collar on EOB, no pillows per orders    Assessment  Patient is 72 y.o. male admitted with uncontrollable neck/upper back pain.  Patient was discharged on 12/15 after C3-C7 fusion on 12/12.  PMH includes CAD s/p stent, HTN, HLD, ETOH use, and GERD.  Today patient seen for PT evaluation, primarily limited by pain with mobility.  He mobilized as detailed below, ambulating with very slow, shuffled pace, posterior postural lean to find position of comfort.  Patient mobilized supine <> sit via log roll x 4 total due to increased pain with transition, quickly returning to supine the first 3 attempts.  Educated patient on importance of mobility & on compensatory strategies.  Patient reported having to call EMS 2x since DC home on 12/15 due to uncontrolled pain & difficulty mobilizing (assistance to get off floor after buckling with STS from recliner).  Anticipate patient will be able to DC home with home health PT & spouse support once pain better controlled & with mobility progress.    Plan    Recommend Physical Therapy 4 times per week until therapy goals are met for the following treatments:  Bed Mobility, Equipment, Gait Training, Neuro Re-Education / Balance, Self Care/Home Evaluation, Stair Training, Therapeutic Activities, and Therapeutic Exercises    DC Equipment Recommendations: None  Discharge Recommendations: Other - (May progress to DC home with home health pending improved pain control & mobility.  If not would benefit from placement.)    Objective     12/19/22 1213   Precautions   Precautions Fall Risk;Cervical Collar  ;Spinal / Back Precautions    Comments Aspen collar on EOB, no pillows per orders   Prior Living Situation   Prior Services None   Housing  / Facility 1 Story House   Steps Into Home 2   Equipment Owned Single Point Cane;Front-Wheel Walker   Lives with - Patient's Self Care Capacity Spouse   Comments Pt reports IND prior to sx, mobilized well until Fri 12/16, then pain so severe he was unable to get OOB   Prior Level of Functional Mobility   Bed Mobility Independent   Transfer Status Independent   Ambulation Independent   Assistive Devices Used Front-Wheel Walker   Stairs Independent   Comments FWW since sx on 12/12   History of Falls   History of Falls Yes (reported fall prior to admit, EMS assistance required to get off floor)   Cognition    Cognition / Consciousness WDL   Level of Consciousness Alert   New Learning Impaired   Comments Pleasant & cooperative, limited by pain with movement   Active ROM Lower Body    Active ROM Lower Body  WDL   Strength Lower Body   Lower Body Strength  WDL   Sensation Lower Body   Lower Extremity Sensation   WDL   Comments Denied numbness/tingling   Neurological Concerns   Standing Posture During ADL's Posterior Lean   Balance Assessment   Sitting Balance (Static) Fair -   Sitting Balance (Dynamic) Fair -   Standing Balance (Static) Fair -   Standing Balance (Dynamic) Poor +   Weight Shift Sitting Fair   Weight Shift Standing Fair   Gait Analysis   Gait Level Of Assist Contact Guard Assist   Assistive Device Front Wheel Walker   Distance (Feet) 60   # of Times Distance was Traveled 2   Deviation Bradykinetic;Shuffled Gait   Weight Bearing Status No restrictions   Comments Very slow, shuffled steps with poor foot clearance. Several standing rest breaks when walking back to room due to increasing pain   Bed Mobility    Supine to Sit Moderate Assist   Sit to Supine Moderate Assist   Scooting Standby Assist (seated EOB)   Rolling Supervised   Comments multiple reps of supine <> sit, pt c/o sharp pain with transition   Functional Mobility   Sit to Stand Contact Guard Assist   Bed, Chair, Wheelchair Transfer Contact Guard  Assist   Transfer Method Stand Step   Mobility bed mobility, ambulation   Activity Tolerance   Sitting Edge of Bed 5 min   Standing 10-12 min   Short Term Goals    Short Term Goal # 1 Pt will perform supine <> sit from flat bed via log roll with SPV in 6 visits to progress bed mobility   Short Term Goal # 2 Pt will perform STS/functional transfers with FWW and SPV within 6 visits to progress OOB mobility   Short Term Goal # 3 Pt will ambulate 150 ft with FWW and SPV within 6 visits to progress ambulation   Short Term Goal # 4 Pt will negotiate 2 platform steps with FWWand SPV in 6 visits to access home   Problem List    Problems Pain;Impaired Bed Mobility;Impaired Transfers;Impaired Ambulation;Decreased Activity Tolerance   Anticipated Discharge Equipment and Recommendations   DC Equipment Recommendations None   Discharge Recommendations Other - (May progress to DC home with home health pending improved pain control & mobility.  If not would benefit from placement.)

## 2022-12-20 PROCEDURE — 700101 HCHG RX REV CODE 250

## 2022-12-20 PROCEDURE — G0378 HOSPITAL OBSERVATION PER HR: HCPCS

## 2022-12-20 PROCEDURE — A9270 NON-COVERED ITEM OR SERVICE: HCPCS

## 2022-12-20 PROCEDURE — 700102 HCHG RX REV CODE 250 W/ 637 OVERRIDE(OP): Performed by: NURSE PRACTITIONER

## 2022-12-20 PROCEDURE — 700102 HCHG RX REV CODE 250 W/ 637 OVERRIDE(OP)

## 2022-12-20 PROCEDURE — 99225 PR SUBSEQUENT OBSERVATION CARE,LEVEL II: CPT | Performed by: NURSE PRACTITIONER

## 2022-12-20 PROCEDURE — A9270 NON-COVERED ITEM OR SERVICE: HCPCS | Performed by: NURSE PRACTITIONER

## 2022-12-20 RX ORDER — CYCLOBENZAPRINE HCL 5 MG
10 TABLET ORAL 3 TIMES DAILY PRN
Status: DISCONTINUED | OUTPATIENT
Start: 2022-12-20 | End: 2022-12-20

## 2022-12-20 RX ORDER — TIZANIDINE 4 MG/1
4 TABLET ORAL EVERY 8 HOURS PRN
Status: DISCONTINUED | OUTPATIENT
Start: 2022-12-20 | End: 2022-12-21 | Stop reason: HOSPADM

## 2022-12-20 RX ORDER — CYCLOBENZAPRINE HCL 5 MG
10 TABLET ORAL 3 TIMES DAILY
Status: DISCONTINUED | OUTPATIENT
Start: 2022-12-20 | End: 2022-12-21 | Stop reason: HOSPADM

## 2022-12-20 RX ADMIN — ACETAMINOPHEN 650 MG: 325 TABLET, FILM COATED ORAL at 09:47

## 2022-12-20 RX ADMIN — LOSARTAN POTASSIUM 100 MG: 50 TABLET, FILM COATED ORAL at 04:23

## 2022-12-20 RX ADMIN — BACLOFEN 10 MG: 10 TABLET ORAL at 09:48

## 2022-12-20 RX ADMIN — CARVEDILOL 12.5 MG: 12.5 TABLET, FILM COATED ORAL at 09:47

## 2022-12-20 RX ADMIN — LEVOTHYROXINE SODIUM 125 MCG: 0.12 TABLET ORAL at 04:23

## 2022-12-20 RX ADMIN — SENNOSIDES AND DOCUSATE SODIUM 2 TABLET: 50; 8.6 TABLET ORAL at 04:23

## 2022-12-20 RX ADMIN — SENNOSIDES AND DOCUSATE SODIUM 2 TABLET: 50; 8.6 TABLET ORAL at 17:48

## 2022-12-20 RX ADMIN — RIVAROXABAN 10 MG: 10 TABLET, FILM COATED ORAL at 17:48

## 2022-12-20 RX ADMIN — BACLOFEN 10 MG: 10 TABLET ORAL at 15:36

## 2022-12-20 RX ADMIN — CYCLOBENZAPRINE HYDROCHLORIDE 10 MG: 5 TABLET, FILM COATED ORAL at 10:21

## 2022-12-20 RX ADMIN — CYCLOBENZAPRINE HYDROCHLORIDE 10 MG: 5 TABLET, FILM COATED ORAL at 15:36

## 2022-12-20 RX ADMIN — ROSUVASTATIN CALCIUM 20 MG: 20 TABLET, FILM COATED ORAL at 17:48

## 2022-12-20 RX ADMIN — AMLODIPINE BESYLATE 5 MG: 5 TABLET ORAL at 04:23

## 2022-12-20 RX ADMIN — CARVEDILOL 12.5 MG: 12.5 TABLET, FILM COATED ORAL at 17:48

## 2022-12-20 RX ADMIN — OMEPRAZOLE 40 MG: 20 CAPSULE, DELAYED RELEASE ORAL at 04:23

## 2022-12-20 RX ADMIN — CYCLOBENZAPRINE HYDROCHLORIDE 10 MG: 5 TABLET, FILM COATED ORAL at 20:26

## 2022-12-20 RX ADMIN — ASPIRIN 81 MG: 81 TABLET, COATED ORAL at 04:23

## 2022-12-20 RX ADMIN — LIDOCAINE 1 PATCH: 50 PATCH TOPICAL at 00:00

## 2022-12-20 ASSESSMENT — PAIN DESCRIPTION - PAIN TYPE
TYPE: ACUTE PAIN

## 2022-12-20 ASSESSMENT — LIFESTYLE VARIABLES: REASON UNABLE TO ASSESS: N

## 2022-12-20 NOTE — PROGRESS NOTES
[facility] Medicine Daily Progress Note    Date of Service  12/20/2022    Chief Complaint  Rodney Arias is a 72 y.o. male admitted 12/18/2022 with upper back pain    Hospital Course  MrDionne Arias is a 72 y.o. male with past medical history of CAD status post stent, hypertension, hyperlipidemia, alcohol use, GERD, who presented to the hospital on 12/18/2022 with acute neck pain.  The patient underwent C3-C7 laminectomy fusion secondary to intramural compressive lesion with Dr. Wright on 12/12/2022 and was discharged from the hospital on 12/15.       History was obtained from the patient and his wife who is at bedside.  He reports that he was doing well the day of discharge however began having significant neck pain yesterday prompting return to the ER today.  He denies numbness, tingling or weakness.  He reports that he has been following C-spine precautions as set by neurosurgery at time of discharge. He denies new fall or trauma precipitating pain.     In ER, CT cervical spine without contrast was obtained demonstrating no acute traumatic bony injury with lucency adjacent to the transpedicular screws at C3-C4 which could be related to recent postop changes or new component of hardware loosening.  ERP consulted surgery, Dr. Wright.  It was decided that due to the patient's intractable pain, the patient would be admitted to the hospital for pain control and further work with PT/OT. Neurosurgery will follow.  Laboratory evaluation was significant for WBC of 8.8, hemoglobin of 14, mildly elevated ESR of 26, stable electrolytes, and normal renal function.     Per neurosurgery, no surgical intervention at this time.  Continue pain management and add on muscle relaxant as pain likely due to muscle spasm.  Continue to encourage patient with ambulation.  Continue bowel regimen for possible constipation with use of narcotics.    Interval Problem Update  -Patient seen and examined at bedside.  Patient reports  no pain while laying down, but increased upper back left scapula pain with movement.  Dr. Wright also bedside during examination.  Patient's spouse at bedside.  Continue pain management with muscle relaxant round-the-clock for patient.  -Plan of care: Continue to encourage patient to work with PT/OT; utilize established pain management in place; use muscle relaxant tizanidine and Flexeril to help with muscle spasming  -Disposition: Patient to be kept overnight under observation for pain management  -Lab work: Reviewed; expected  -VSS at this time    I have discussed this patient's plan of care and discharge plan at IDT rounds today with Case Management, Nursing, Nursing leadership, and other members of the IDT team.    Consultants/Specialty  neurosurgery  -- Dr. Wright    Code Status  Full Code    Disposition  Patient is not medically cleared for discharge.   Anticipate discharge to to home with organized home healthcare and close outpatient follow-up.  I have placed the appropriate orders for post-discharge needs.    Review of Systems  ROS     Physical Exam  Temp:  [36.5 °C (97.7 °F)-36.6 °C (97.9 °F)] 36.6 °C (97.9 °F)  Pulse:  [69-72] 72  Resp:  [16] 16  BP: (113-148)/(64-76) 130/72  SpO2:  [94 %-97 %] 96 %    Physical Exam    Fluids  No intake or output data in the 24 hours ending 12/20/22 1055    Laboratory  Recent Labs     12/18/22  1905 12/19/22  0309   WBC 8.8 9.6   RBC 4.89 4.63*   HEMOGLOBIN 14.6 13.8*   HEMATOCRIT 42.0 40.0*   MCV 85.9 86.4   MCH 29.9 29.8   MCHC 34.8 34.5   RDW 37.9 38.9   PLATELETCT 224 229   MPV 9.4 9.3     Recent Labs     12/18/22  1905 12/19/22  0309   SODIUM 134* 137   POTASSIUM 3.5* 3.9   CHLORIDE 97 99   CO2 25 28   GLUCOSE 115* 110*   BUN 15 17   CREATININE 0.78 0.68   CALCIUM 9.0 8.8                   Imaging  CT-CSPINE WITHOUT PLUS RECONS   Final Result         1.  No acute traumatic bony injury of the cervical spine is apparent.   2.  Lucency adjacent to the transpedicular  "screws at C3 and C4, could be related to recent postop changes or related to component of hardware loosening.   3.  Air and interspersed fluid in the soft tissues of the surgical bed from spinal fusion, within expected limits for recent postop history.   4.  Mild bilateral neural foraminal stenosis   5.  Atherosclerosis           Assessment/Plan  * Intractable pain- (present on admission)  Assessment & Plan  Secondary to C3-C5 laminectomy with Dr. Wright on 12/12/2022  -Multimodal pain management  -PT OT eval  -Able to move minimally even with pain meds before PT OT eval  -Denies pain at rest -with movement/ambulation  -Added baclofen, tizanidine, and Flexeril per recommendations from neurosurgery    Constipation due to opioid therapy  Assessment & Plan  Has not had bowel movement in 5 days  Encourage bowel regimen  Can order Rellastor if standard bowel regimen is ineffective    Status post laminectomy- (present on admission)  Assessment & Plan  -Status post C3-C7 laminectomy with fusion 2/2 cervical cyst causing cord compression with Dr. Wright on 12/12/22   -Patient was discharged from the hospital on 12/15 was doing well until he developed severe neck pain today  -CT C-spine without on admission demonstrates:  no acute traumatic bony injury of the cervical spine is apparent, lucency adjacent to the transpedicular screws at C3 and C4, could be related to recent postop changes or related to component of hardware loosening.  -No numbness/tingling or weakness  -ERP consulted NSG, Dr. Wright. It was decided to admit pain for pain control and continued work with PT/OT   -Neurosurgery to follow  -Pain control as above in \"intractable pain\"   -PT OT ordered  -Continue cervical spine precautions with c-collar when out of bed, head of bed flat/no pillows, no lifting more than 10 lbs  -Dr. Wright saw patient on 12/20; no surgical intervention at this time; continue pain management    CAD (coronary artery disease)- (present on " admission)  Assessment & Plan  History of MI status post stent in 1998  Denies chest pain on admission  -Continue statin and aspirin    Hypothyroidism (acquired)- (present on admission)  Assessment & Plan  Chronic  -Continue levothyroxine    Alcohol use- (present on admission)  Assessment & Plan  Patient reports that he drinks 6 beers daily  Denies history of alcohol withdrawal  - cessation  -Monitor for alcohol withdrawal and initiate CIWA if needed    Obstructive sleep apnea- (present on admission)  Assessment & Plan  Hx of, does not wear home CPAP    GERD (gastroesophageal reflux disease)- (present on admission)  Assessment & Plan  Chronic   -continue omeprazole    Dyslipidemia- (present on admission)  Assessment & Plan  Chronic  -Continue statin    Essential hypertension- (present on admission)  Assessment & Plan  Chronic  /99 on admission however uncontrolled pain could be contributory   Pressure stabilized  -Continue amlodipine, coreg, losartan   -Pain control  -PRN hydralazine IV          VTE prophylaxis: SCDs/TEDs    I have performed a physical exam and reviewed and updated ROS and Plan today (12/20/2022). In review of yesterday's note (12/19/2022), there are no changes except as documented above.      ================================================================================================================================================================================================================  Please note that this dictation was created using voice recognition software. I have made every reasonable attempt to correct obvious errors, but there may be errors of grammar and possibly content that I did not discover before finalizing the note.    Electronically signed by:  Dr. STUART Velasquez, DNP, APRN, FNP-C  Hospitalist Services  [facility]  [phone]  [email or web address]  12/20/22                 [id number]

## 2022-12-20 NOTE — CARE PLAN
The patient is Stable - Low risk of patient condition declining or worsening    Shift Goals  Clinical Goals: pain control  Patient Goals: pain control, rest    Progress made toward(s) clinical / shift goals:  patient rested throughout the night.       Problem: Pain - Standard  Goal: Alleviation of pain or a reduction in pain to the patient’s comfort goal  Outcome: Progressing     Problem: Skin Integrity  Goal: Skin integrity is maintained or improved  Outcome: Progressing     Problem: Knowledge Deficit - Standard  Goal: Patient and family/care givers will demonstrate understanding of plan of care, disease process/condition, diagnostic tests and medications  Outcome: Progressing       Patient is not progressing towards the following goals:

## 2022-12-20 NOTE — HOSPITAL COURSE
Mr. Rdoney Arias is a 72 y.o. male with past medical history of CAD status post stent, hypertension, hyperlipidemia, alcohol use, GERD, who presented to the hospital on 12/18/2022 with acute neck pain.  The patient underwent C3-C7 laminectomy fusion secondary to intramural compressive lesion with Dr. Wright on 12/12/2022 and was discharged from the hospital on 12/15.       History was obtained from the patient and his wife who is at bedside.  He reports that he was doing well the day of discharge however began having significant neck pain yesterday prompting return to the ER today.  He denies numbness, tingling or weakness.  He reports that he has been following C-spine precautions as set by neurosurgery at time of discharge. He denies new fall or trauma precipitating pain.     In ER, CT cervical spine without contrast was obtained demonstrating no acute traumatic bony injury with lucency adjacent to the transpedicular screws at C3-C4 which could be related to recent postop changes or new component of hardware loosening.  ERP consulted surgery, Dr. Wright.  It was decided that due to the patient's intractable pain, the patient would be admitted to the hospital for pain control and further work with PT/OT. Neurosurgery will follow.  Laboratory evaluation was significant for WBC of 8.8, hemoglobin of 14, mildly elevated ESR of 26, stable electrolytes, and normal renal function.     Per neurosurgery, no surgical intervention at this time.  Continue pain management and add on muscle relaxant as pain likely due to muscle spasm.  Continue to encourage patient with ambulation.  Continue bowel regimen for possible constipation with use of narcotics.    Patient was placed on baclofen tizanidine and Flexeril to help with severe muscle spasms in the upper back.  Patient responded well and noted significant improvement from her neck pain.  Discussed with patient continuation of muscle relaxant regimen, use oxycodone only  when necessary.  Continue bowel management if using narcotics.  Patient highly recommended to follow-up with PCP along with neurosurgery, Dr. Wright for management of care.  All questions and concerns answered prior to being discharged.  Patient discharged home.

## 2022-12-20 NOTE — PROGRESS NOTES
Pt aox4, 2L O2, vitals WNL pt continues to have pain between his shoulders when he moves. Tolerates diet. Continue to manage pain.   Pt is requesting to see MD Wright or one of the Neuro Surg. Wife is very concerned of his pain.

## 2022-12-20 NOTE — CARE PLAN
The patient is Stable - Low risk of patient condition declining or worsening    Shift Goals  Clinical Goals: pain control  Patient Goals: rest, pain control    Progress made toward(s) clinical / shift goals:    Problem: Pain - Standard  Goal: Alleviation of pain or a reduction in pain to the patient’s comfort goal  Outcome: Progressing     Problem: Skin Integrity  Goal: Skin integrity is maintained or improved  Outcome: Progressing     Problem: Knowledge Deficit - Standard  Goal: Patient and family/care givers will demonstrate understanding of plan of care, disease process/condition, diagnostic tests and medications  Outcome: Progressing       Patient is not progressing towards the following goals:  Continue with pain management

## 2022-12-21 ENCOUNTER — PHARMACY VISIT (OUTPATIENT)
Dept: PHARMACY | Facility: MEDICAL CENTER | Age: 72
End: 2022-12-21
Payer: COMMERCIAL

## 2022-12-21 VITALS
DIASTOLIC BLOOD PRESSURE: 78 MMHG | SYSTOLIC BLOOD PRESSURE: 131 MMHG | RESPIRATION RATE: 17 BRPM | WEIGHT: 214.73 LBS | HEART RATE: 70 BPM | HEIGHT: 71 IN | BODY MASS INDEX: 30.06 KG/M2 | OXYGEN SATURATION: 91 % | TEMPERATURE: 97.5 F

## 2022-12-21 PROBLEM — R52 INTRACTABLE PAIN: Status: RESOLVED | Noted: 2022-12-18 | Resolved: 2022-12-21

## 2022-12-21 LAB
ANION GAP SERPL CALC-SCNC: 10 MMOL/L (ref 7–16)
BUN SERPL-MCNC: 19 MG/DL (ref 8–22)
CALCIUM SERPL-MCNC: 8.6 MG/DL (ref 8.5–10.5)
CHLORIDE SERPL-SCNC: 98 MMOL/L (ref 96–112)
CO2 SERPL-SCNC: 27 MMOL/L (ref 20–33)
CREAT SERPL-MCNC: 0.75 MG/DL (ref 0.5–1.4)
ERYTHROCYTE [DISTWIDTH] IN BLOOD BY AUTOMATED COUNT: 38.5 FL (ref 35.9–50)
GFR SERPLBLD CREATININE-BSD FMLA CKD-EPI: 94 ML/MIN/1.73 M 2
GLUCOSE SERPL-MCNC: 104 MG/DL (ref 65–99)
HCT VFR BLD AUTO: 38.2 % (ref 42–52)
HGB BLD-MCNC: 13 G/DL (ref 14–18)
MCH RBC QN AUTO: 29.4 PG (ref 27–33)
MCHC RBC AUTO-ENTMCNC: 34 G/DL (ref 33.7–35.3)
MCV RBC AUTO: 86.4 FL (ref 81.4–97.8)
PLATELET # BLD AUTO: 224 K/UL (ref 164–446)
PMV BLD AUTO: 9 FL (ref 9–12.9)
POTASSIUM SERPL-SCNC: 4 MMOL/L (ref 3.6–5.5)
RBC # BLD AUTO: 4.42 M/UL (ref 4.7–6.1)
SODIUM SERPL-SCNC: 135 MMOL/L (ref 135–145)
WBC # BLD AUTO: 6.4 K/UL (ref 4.8–10.8)

## 2022-12-21 PROCEDURE — 97530 THERAPEUTIC ACTIVITIES: CPT

## 2022-12-21 PROCEDURE — 700102 HCHG RX REV CODE 250 W/ 637 OVERRIDE(OP)

## 2022-12-21 PROCEDURE — 80048 BASIC METABOLIC PNL TOTAL CA: CPT

## 2022-12-21 PROCEDURE — 85027 COMPLETE CBC AUTOMATED: CPT

## 2022-12-21 PROCEDURE — G0378 HOSPITAL OBSERVATION PER HR: HCPCS

## 2022-12-21 PROCEDURE — 700101 HCHG RX REV CODE 250

## 2022-12-21 PROCEDURE — 700102 HCHG RX REV CODE 250 W/ 637 OVERRIDE(OP): Performed by: NURSE PRACTITIONER

## 2022-12-21 PROCEDURE — 99217 PR OBSERVATION CARE DISCHARGE: CPT | Performed by: NURSE PRACTITIONER

## 2022-12-21 PROCEDURE — A9270 NON-COVERED ITEM OR SERVICE: HCPCS | Performed by: NURSE PRACTITIONER

## 2022-12-21 PROCEDURE — A9270 NON-COVERED ITEM OR SERVICE: HCPCS

## 2022-12-21 PROCEDURE — RXMED WILLOW AMBULATORY MEDICATION CHARGE: Performed by: NURSE PRACTITIONER

## 2022-12-21 RX ORDER — TIZANIDINE 4 MG/1
4 TABLET ORAL EVERY 8 HOURS PRN
Qty: 63 TABLET | Refills: 0 | Status: SHIPPED | OUTPATIENT
Start: 2022-12-21 | End: 2023-01-11

## 2022-12-21 RX ORDER — CYCLOBENZAPRINE HCL 10 MG
10 TABLET ORAL 3 TIMES DAILY
Qty: 63 TABLET | Refills: 0 | Status: SHIPPED | OUTPATIENT
Start: 2022-12-21 | End: 2023-01-11

## 2022-12-21 RX ADMIN — SENNOSIDES AND DOCUSATE SODIUM 2 TABLET: 50; 8.6 TABLET ORAL at 06:12

## 2022-12-21 RX ADMIN — LIDOCAINE 1 PATCH: 50 PATCH TOPICAL at 06:18

## 2022-12-21 RX ADMIN — BACLOFEN 10 MG: 10 TABLET ORAL at 08:44

## 2022-12-21 RX ADMIN — CYCLOBENZAPRINE HYDROCHLORIDE 10 MG: 5 TABLET, FILM COATED ORAL at 13:17

## 2022-12-21 RX ADMIN — CARVEDILOL 12.5 MG: 12.5 TABLET, FILM COATED ORAL at 08:42

## 2022-12-21 RX ADMIN — LOSARTAN POTASSIUM 100 MG: 50 TABLET, FILM COATED ORAL at 06:12

## 2022-12-21 RX ADMIN — OMEPRAZOLE 40 MG: 20 CAPSULE, DELAYED RELEASE ORAL at 06:12

## 2022-12-21 RX ADMIN — ASPIRIN 81 MG: 81 TABLET, COATED ORAL at 06:12

## 2022-12-21 RX ADMIN — OXYCODONE 5 MG: 5 TABLET ORAL at 06:18

## 2022-12-21 RX ADMIN — LEVOTHYROXINE SODIUM 125 MCG: 0.12 TABLET ORAL at 06:12

## 2022-12-21 RX ADMIN — CYCLOBENZAPRINE HYDROCHLORIDE 10 MG: 5 TABLET, FILM COATED ORAL at 08:42

## 2022-12-21 RX ADMIN — AMLODIPINE BESYLATE 5 MG: 5 TABLET ORAL at 06:12

## 2022-12-21 ASSESSMENT — COGNITIVE AND FUNCTIONAL STATUS - GENERAL
CLIMB 3 TO 5 STEPS WITH RAILING: A LOT
WALKING IN HOSPITAL ROOM: A LITTLE
STANDING UP FROM CHAIR USING ARMS: A LITTLE
SUGGESTED CMS G CODE MODIFIER MOBILITY: CL
MOVING TO AND FROM BED TO CHAIR: A LOT
TURNING FROM BACK TO SIDE WHILE IN FLAT BAD: A LOT
MOVING FROM LYING ON BACK TO SITTING ON SIDE OF FLAT BED: A LOT
MOBILITY SCORE: 14

## 2022-12-21 ASSESSMENT — GAIT ASSESSMENTS
DISTANCE (FEET): 80
GAIT LEVEL OF ASSIST: CONTACT GUARD ASSIST
DEVIATION: BRADYKINETIC;SHUFFLED GAIT;DECREASED BASE OF SUPPORT
ASSISTIVE DEVICE: FRONT WHEEL WALKER

## 2022-12-21 NOTE — DISCHARGE INSTRUCTIONS
FOLLOW UP ITEMS POST DISCHARGE  Please call [phone] to schedule PCP appointment for patient.    Required specialty appointments include:       Discharge Instructions per JAMI Khan    -Follow-up with PCP s/p hospitalization  -Follow-up with neurosurgery, Dr. Wright for management of care  -Continue use of muscle relaxants: Baclofen, tizanidine, Flexeril for muscle spasms  -Only use oxycodone if pain is significant  -Continue to use over-the-counter stool softeners while on pain medications  -Resume all other home medications    DIET: As tolerated    ACTIVITY: As tolerated    DIAGNOSIS: Neck pain    Return to ER if symptoms persist, chest pain, palpitations, shortness of breath, numbness, tingling, weakness, and high fevers.  Discharge Instructions    Discharged to home by car with relative. Discharged via wheelchair, hospital escort: Yes.  Special equipment needed: TLSO and Walker    Be sure to schedule a follow-up appointment with your primary care doctor or any specialists as instructed.     Discharge Plan:   Diet Plan: Discussed  Activity Level: Discussed  Confirmed Follow up Appointment: Patient to Call and Schedule Appointment  Confirmed Symptoms Management: Discussed  Medication Reconciliation Updated: Yes  Influenza Vaccine Indication: Not indicated: Previously immunized this influenza season and > 8 years of age    I understand that a diet low in cholesterol, fat, and sodium is recommended for good health. Unless I have been given specific instructions below for another diet, I accept this instruction as my diet prescription.   Other diet:     Special Instructions: None    -Is this patient being discharged with medication to prevent blood clots?  No    Is patient discharged on Warfarin / Coumadin?   No

## 2022-12-21 NOTE — THERAPY
Physical Therapy   Daily Treatment     Patient Name: Rodney Arias  Age:  72 y.o., Sex:  male  Medical Record #: [id number]  Today's Date: 12/21/2022     Precautions  Precautions: Fall Risk;Cervical Collar  ;Spinal / Back Precautions   Comments: Aspen collar on EOB, no pillows per orders    Assessment    Patient seen for PT follow up session.  He was able to progress ambulation distance, ambulating with improved gait speed though still slow.  Patient was able to increase step length with cues, still had to take several standing rest breaks especially on last half of distance.  When standing in front of chair patient began having trembling & buckling in legs, required assist to aim buttocks to sit on chair.  Noted difficulty with anterior lean/hip flexion to adjust position in chair.  Patient would benefit from sitting up in chair for meals, mobilizing with nursing staff including to bathroom.  Will continue to follow.     Plan    Physical Therapy Treatment Plan: Continue Current Treatment Plan    DC Equipment Recommendations: None  Discharge Recommendations: (May progress to DC home with home health pending improved pain control & mobility.  If not would benefit from placement.)    Subjective    Pt reported he has not been out of bed since PT & OT vipul on 12/19, reported he didn't know he needed to call staff for assistance, assumed they would initiate mobility.     Objective     12/21/22 0849   Precautions   Precautions Fall Risk;Cervical Collar  ;Spinal / Back Precautions    Comments Aspen collar on EOB, no pillows per orders   Cognition    Cognition / Consciousness WDL   Level of Consciousness Alert   Safety Awareness Impaired   New Learning Impaired   Comments Cooperative, pleasant   Neurological Concerns   Standing Posture During ADL's Posterior Lean   Balance   Sitting Balance (Static) Fair -   Sitting Balance (Dynamic) Fair -   Standing Balance (Static) Fair -   Standing Balance (Dynamic) Poor +   Weight  Shift Sitting Fair   Weight Shift Standing Fair   Skilled Intervention Verbal Cuing;Tactile Cuing;Facilitation   Bed Mobility    Supine to Sit Moderate Assist   Sit to Supine (NT, left up in chair)   Rolling Standby Assist   Skilled Intervention Verbal Cuing;Tactile Cuing;Facilitation   Comments cues for log roll   Functional Mobility   Sit to Stand Contact Guard Assist   Bed, Chair, Wheelchair Transfer Contact Guard Assist   Transfer Method Stand Step   Mobility bed mobility, ambulation   Distance (Feet) 80   # of Times Distance was Traveled 2   Skilled Intervention Verbal Cuing;Tactile Cuing;Facilitation   Comments frequent standing rest breaks, buckling & trembling when standing in front of chair   Gait Analysis   Gait Level Of Assist Contact Guard Assist   Assistive Device Front Wheel Walker   Deviation Bradykinetic;Shuffled Gait;Decreased Base Of Support   Level of Assist with Stairs Refused   Weight Bearing Status No restrictions   Skilled Intervention Verbal Cuing;Compensatory Strategies   Comments Improved step length with cues, improved speed   Activity Tolerance   Sitting in Chair Post session   Sitting Edge of Bed 5 min   Standing 10-15 min   Short Term Goals    Short Term Goal # 1 Pt will perform supine <> sit from flat bed via log roll with SPV in 6 visits to progress bed mobility   Goal Outcome # 1 Progressing as expected   Short Term Goal # 2 Pt will perform STS/functional transfers with FWW and SPV within 6 visits to progress OOB mobility   Goal Outcome # 2 Progressing as expected   Short Term Goal # 3 Pt will ambulate 150 ft with FWW and SPV within 6 visits to progress ambulation   Goal Outcome # 3 Progressing as expected   Short Term Goal # 4 Pt will negotiate 2 platform steps with FWWand SPV in 6 visits to access home   Goal Outcome # 4 Goal not met   Physical Therapy Treatment Plan   Physical Therapy Treatment Plan Continue Current Treatment Plan   Anticipated Discharge Equipment and  Recommendations   DC Equipment Recommendations None   Discharge Recommendations (May progress to DC home with home health pending improved pain control & mobility.  If not would benefit from placement.)

## 2022-12-21 NOTE — PROGRESS NOTES
Pt ambulated on hallway with PT,back to chair,no c/o pain,looks comfortable,poc explained,refusing breakfast for now,will try again,call light given,explained to call if needed,pt a&ox4.

## 2022-12-21 NOTE — CARE PLAN
The patient is Stable - Low risk of patient condition declining or worsening    Shift Goals  Clinical Goals: pain control  Patient Goals: rest, pain control    Progress made toward(s) clinical / shift goals:    Problem: Pain - Standard  Goal: Alleviation of pain or a reduction in pain to the patient’s comfort goal  Outcome: Progressing     Problem: Skin Integrity  Goal: Skin integrity is maintained or improved  Outcome: Progressing     Problem: Knowledge Deficit - Standard  Goal: Patient and family/care givers will demonstrate understanding of plan of care, disease process/condition, diagnostic tests and medications  Outcome: Progressing       Patient is not progressing towards the following goals:

## 2022-12-21 NOTE — DISCHARGE SUMMARY
Discharge Summary    CHIEF COMPLAINT ON ADMISSION  Chief Complaint   Patient presents with    Back Pain     Yesterday, pt started having severe, uncontrollable pain across his upper back.        Reason for Admission  ems     Admission Date  12/18/2022    CODE STATUS  Full Code    HPI & HOSPITAL COURSE    Mr. Rodney Arias is a 72 y.o. male with past medical history of CAD status post stent, hypertension, hyperlipidemia, alcohol use, GERD, who presented to the hospital on 12/18/2022 with acute neck pain.  The patient underwent C3-C7 laminectomy fusion secondary to intramural compressive lesion with Dr. Wright on 12/12/2022 and was discharged from the hospital on 12/15.       History was obtained from the patient and his wife who is at bedside.  He reports that he was doing well the day of discharge however began having significant neck pain yesterday prompting return to the ER today.  He denies numbness, tingling or weakness.  He reports that he has been following C-spine precautions as set by neurosurgery at time of discharge. He denies new fall or trauma precipitating pain.     In ER, CT cervical spine without contrast was obtained demonstrating no acute traumatic bony injury with lucency adjacent to the transpedicular screws at C3-C4 which could be related to recent postop changes or new component of hardware loosening.  ERP consulted surgery, Dr. Wright.  It was decided that due to the patient's intractable pain, the patient would be admitted to the hospital for pain control and further work with PT/OT. Neurosurgery will follow.  Laboratory evaluation was significant for WBC of 8.8, hemoglobin of 14, mildly elevated ESR of 26, stable electrolytes, and normal renal function.     Per neurosurgery, no surgical intervention at this time.  Continue pain management and add on muscle relaxant as pain likely due to muscle spasm.  Continue to encourage patient with ambulation.  Continue bowel regimen for possible  constipation with use of narcotics.    Patient was placed on baclofen tizanidine and Flexeril to help with severe muscle spasms in the upper back.  Patient responded well and noted significant improvement from her neck pain.  Discussed with patient continuation of muscle relaxant regimen, use oxycodone only when necessary.  Continue bowel management if using narcotics.  Patient highly recommended to follow-up with PCP along with neurosurgery, Dr. Wright for management of care.  All questions and concerns answered prior to being discharged.  Patient discharged home.    Therefore, he is discharged in good and stable condition to home with close outpatient follow-up.    The patient recovered much more quickly than anticipated on admission.    Discharge Date  12/21/22      FOLLOW UP ITEMS POST DISCHARGE  Please call [phone] to schedule PCP appointment for patient.    Required specialty appointments include:       Discharge Instructions per JAMI Khan    -Follow-up with PCP s/p hospitalization  -Follow-up with neurosurgery, Dr. Wright for management of care  -Continue use of muscle relaxants: Baclofen, tizanidine, Flexeril for muscle spasms  -Only use oxycodone if pain is significant  -Continue to use over-the-counter stool softeners while on pain medications  -Resume all other home medications    DIET: As tolerated    ACTIVITY: As tolerated    DIAGNOSIS: Neck pain    Return to ER if symptoms persist, chest pain, palpitations, shortness of breath, numbness, tingling, weakness, and high fevers.        DISCHARGE DIAGNOSES  Principal Problem (Resolved):    Intractable pain POA: Yes  Active Problems:    Essential hypertension POA: Yes    Dyslipidemia POA: Yes    GERD (gastroesophageal reflux disease) POA: Yes    Obstructive sleep apnea POA: Yes    Alcohol use POA: Yes    Hypothyroidism (acquired) POA: Yes    CAD (coronary artery disease) (Chronic) POA: Yes    Status post laminectomy POA: Yes     Constipation due to opioid therapy POA: Unknown      FOLLOW UP  No future appointments.  Sultan KEARA Mars M.D.  [address]  [facility] [id number] [phone]    Schedule an appointment as soon as possible for a visit in 1 week(s)      Alfonzo Wright M.D.  [address]  [address]  [facility] [address]  [phone]    Schedule an appointment as soon as possible for a visit in 1 week(s)        MEDICATIONS ON DISCHARGE     Medication List        START taking these medications        Instructions   cyclobenzaprine 10 mg Tabs  Commonly known as: Flexeril   Take 1 Tablet by mouth in the morning, at noon, and at bedtime for 21 days.  Dose: 10 mg     tizanidine 4 MG Tabs  Commonly known as: ZANAFLEX   Take 1 Tablet by mouth every 8 hours as needed (back spasm) for up to 21 days.  Dose: 4 mg            CONTINUE taking these medications        Instructions   amLODIPine 5 MG Tabs  Commonly known as: NORVASC   Take 1 Tablet by mouth every day.  Dose: 5 mg     aspirin 81 MG EC tablet   Take 1 Tablet by mouth every day.  Dose: 81 mg     baclofen 10 MG Tabs  Commonly known as: LIORESAL   Take 10 mg by mouth 3 times a day. Indications: Muscle Spasm  Dose: 10 mg     carvedilol 12.5 MG Tabs  Commonly known as: COREG   Take 1 Tablet by mouth 2 times a day with meals.  Dose: 12.5 mg     ibuprofen 200 MG Tabs  Commonly known as: MOTRIN   Take 400 mg by mouth every 6 hours as needed for Mild Pain or Moderate Pain.  Dose: 400 mg     levothyroxine 125 MCG Tabs  Commonly known as: SYNTHROID   TAKE 1 TABLET BY MOUTH EVERY MORNING ON AN EMPTY STOMACH  Dose: 125 mcg     losartan 100 MG Tabs  Commonly known as: COZAAR   TAKE 1 TABLET BY MOUTH EVERY DAY  Dose: 100 mg     omeprazole 40 MG delayed-release capsule  Commonly known as: PRILOSEC   Doctor's comments: ZERO refills remain on this prescription. Your patient is requesting advance approval of refills for this medication to PREVENT ANY MISSED DOSES  Take 1 Capsule by mouth every  day.  Dose: 40 mg     ondansetron 4 MG Tbdp  Commonly known as: ZOFRAN ODT   Take 1 Tablet by mouth every 8 hours as needed for Nausea/Vomiting.  Dose: 4 mg     oxyCODONE-acetaminophen 5-325 MG Tabs  Commonly known as: PERCOCET   Take 1 Tablet by mouth every four hours as needed for Moderate Pain for up to 7 days.  Dose: 1 Tablet     POTASSIUM PO   Take 1 Tablet by mouth every morning. Indications: OTC Supplement  Dose: 1 Tablet     predniSONE 50 MG Tabs  Commonly known as: DELTASONE   Take 50 mg by mouth every day. Pt started on 11/13/2022 for 5 day course  Dose: 50 mg     rosuvastatin 20 MG Tabs  Commonly known as: CRESTOR   Take 1 Tablet by mouth every evening.  Dose: 20 mg              Allergies  Allergies   Allergen Reactions    Shrimp (Diagnostic)      Pt reports that he gets chest pain if he eats to much       DIET  Orders Placed This Encounter   Procedures    Diet Order Diet: Cardiac     Standing Status:   Standing     Number of Occurrences:   1     Order Specific Question:   Diet:     Answer:   Cardiac [6]       ACTIVITY  As tolerated.  Weight bearing as tolerated    CONSULTATIONS  Neurosurgery    PROCEDURES  NONE    IMAGING    CT-CSPINE WITHOUT PLUS RECONS   Final Result         1.  No acute traumatic bony injury of the cervical spine is apparent.   2.  Lucency adjacent to the transpedicular screws at C3 and C4, could be related to recent postop changes or related to component of hardware loosening.   3.  Air and interspersed fluid in the soft tissues of the surgical bed from spinal fusion, within expected limits for recent postop history.   4.  Mild bilateral neural foraminal stenosis   5.  Atherosclerosis            LABORATORY  Lab Results   Component Value Date    SODIUM 135 12/21/2022    POTASSIUM 4.0 12/21/2022    CHLORIDE 98 12/21/2022    CO2 27 12/21/2022    GLUCOSE 104 (H) 12/21/2022    BUN 19 12/21/2022    CREATININE 0.75 12/21/2022    CREATININE 1.0 05/03/2005        Lab Results   Component Value  Date    WBC 6.4 12/21/2022    HEMOGLOBIN 13.0 (L) 12/21/2022    HEMATOCRIT 38.2 (L) 12/21/2022    PLATELETCT 224 12/21/2022        Total time of the discharge process took 36 minutes.    ================================================================================================================================================================================================================  Please note that this dictation was created using voice recognition software. I have made every reasonable attempt to correct obvious errors, but there may be errors of grammar and possibly content that I did not discover before finalizing the note.    Electronically signed by:  Dr. STUART Velasquez, DNP, APRN, FNP-C  Hospitalist Services  [facility]  [phone]  [email or web address]  12/21/22                    [phone]

## 2022-12-21 NOTE — PROGRESS NOTES
Pt a&ox4,no c/o pain,pt not in distress,discharge instructions &prescriptions given,pt able to follow instructions,questions answered,discharge without any events.

## 2022-12-22 NOTE — CONSULTS
ID:  Rodney Arias; 72 y.o. male      Admission Date: 12/18/2022   Date of Consultation: 12/21/2022  Requesting Provider: No att. providers found  PCP: Sultan KEARA Mars M.D.        Chief Complaint:: worsening post-op pain    Reason for consultation:: post-op pain      HPI:  Rodney Arias is a 72 y.o. male who [facility] with [facility] pain after c3-7 laminectomy and fusion and intradural fenestration of cyst. Pt had surgery on 12/12 and then discharged on 12/15. Pt states that he walked a lot on 12/16. He developed worsening pain in bilateral shoulders on 12/17. He fell out of his chair and EMS had to get him up. He had worsening pain between his shoulders on 12/18 and called EMS. CT was performed with no issues. Pain was admitted for pain control. Pain has dissipated and is now mainly in left shoulder. Pt complains of pain with movement and feels he has pain limited weakness. Denies sensory changes or radicular pain.    Past Medical History:  Past Medical History:   Diagnosis Date    Arthritis     neck    Bronchitis     CAD (coronary artery disease) 10/21/2011    Disorder of thyroid     Dyslipidemia 10/21/2011    High cholesterol     HTN (hypertension) 10/21/2011    Indigestion     MI (myocardial infarction) (HCC) 10/21/2011    Myocardial infarction (HCC)     with stent placement    Pain     neck and shoulder    Sleep apnea     Stented coronary artery 10/21/2011       Past Surgical History:  Past Surgical History:   Procedure Laterality Date    CERVICAL FUSION POSTERIOR N/A 12/12/2022    Procedure: CERVICAL 3-7 LAMINOTOMY WITH RESECTION OF INTRADURAL TUMOR AND CERVICAL 3-7 FUSION;  Surgeon: Alfonzo Wright M.D.;  Location: SURGERY [facility];  Service: Neurosurgery    LAMINOTOMY N/A 12/12/2022    Procedure: LAMINOTOMY;  Surgeon: Alfonzo Wright M.D.;  Location: SURGERY [facility];  Service: Neurosurgery    SPINAL CORD TUMOR N/A 12/12/2022    Procedure: EXCISION,  NEOPLASM, SPINAL CORD;  Surgeon: Alfonzo Wright M.D.;  Location: SURGERY [facility];  Service: Neurosurgery    ANGIOPLASTY      ZZZ CARDIAC CATH         Social History:  Social History     Occupational History    Not on file   Tobacco Use    Smoking status: Never     Passive exposure: Never    Smokeless tobacco: Never   Vaping Use    Vaping Use: Never used   Substance and Sexual Activity    Alcohol use: Yes     Alcohol/week: 12.6 oz     Types: 21 Cans of beer per week     Comment: 3 beers a day    Drug use: Yes     Comment: marijuana occasionally    Sexual activity: Not on file     Social History     Social History Narrative    Not on file       Family History:  Family History   Problem Relation Age of Onset    Heart Disease Neg Hx         no premature CAD       Medications:  Prior to Admission medications    Medication Sig Start Date End Date Taking? Authorizing Provider   cyclobenzaprine (FLEXERIL) 10 mg Tab Take 1 Tablet by mouth in the morning, at noon, and at bedtime for 21 days. 12/21/22 1/11/23 Yes JESUS Khan.P.R.N.   tizanidine (ZANAFLEX) 4 MG Tab Take 1 Tablet by mouth every 8 hours as needed (back spasm) for up to 21 days. 12/21/22 1/11/23 Yes JESUS Khan.P.R.N.   omeprazole (PRILOSEC) 40 MG delayed-release capsule Take 1 Capsule by mouth every day. 7/13/22   Sultan KEARA Mars M.D.   oxyCODONE-acetaminophen (PERCOCET) 5-325 MG Tab Take 1 Tablet by mouth every four hours as needed for Moderate Pain for up to 7 days. 12/15/22 12/22/22  Anirudh Everett P.A.-C.   ondansetron (ZOFRAN ODT) 4 MG TABLET DISPERSIBLE Take 1 Tablet by mouth every 8 hours as needed for Nausea/Vomiting. 12/15/22   DANIEL Parrish-ANYI.   predniSONE (DELTASONE) 50 MG Tab Take 50 mg by mouth every day. Pt started on 11/13/2022 for 5 day course    Physician Outpatient   baclofen (LIORESAL) 10 MG Tab Take 10 mg by mouth 3 times a day. Indications: Muscle Spasm 11/29/22   Physician Outpatient    losartan (COZAAR) 100 MG Tab TAKE 1 TABLET BY MOUTH EVERY DAY 11/29/22   Sultan KEARA Mars M.D.   levothyroxine (SYNTHROID) 125 MCG Tab TAKE 1 TABLET BY MOUTH EVERY MORNING ON AN EMPTY STOMACH 11/18/22   Alee Arias M.D.   amLODIPine (NORVASC) 5 MG Tab Take 1 Tablet by mouth every day. 11/18/22   Alee Arias M.D.   ibuprofen (MOTRIN) 200 MG Tab Take 400 mg by mouth every 6 hours as needed for Mild Pain or Moderate Pain.    Physician Outpatient   rosuvastatin (CRESTOR) 20 MG Tab Take 1 Tablet by mouth every evening. 9/12/22   Sultan KEARA Mars M.D.   carvedilol (COREG) 12.5 MG Tab Take 1 Tablet by mouth 2 times a day with meals. 7/14/22   Sultan KEARA Mars M.D.   aspirin 81 MG EC tablet Take 1 Tablet by mouth every day. 6/6/14   Physician Outpatient   POTASSIUM PO Take 1 Tablet by mouth every morning. Indications: OTC Supplement    Physician Outpatient       Allergies:  Allergies   Allergen Reactions    Shrimp (Diagnostic)      Pt reports that he gets chest pain if he eats to much       Review of Systems:    negative for constitutional, HEENT, cardiac, pulmonary, GI, , musculoskeletal, psych, dermatologic, endo, hematologic, immunologic except: as reviewed in HPI      PHYSICAL  EXAMINATION:   A/O x 4, NAD, normal affect  Non-labored respiration, symmetrical chest rise  CN 2-12 grossly intact      Motor Exam (0-5/5, N/T)  STRENGTH R L   Deltoid  5 4   Biceps 5 4+   Triceps 5 4+   Wrist Dorsiflexors 5 5   Finger Abductor 5 5    5 5   Iliopsoas 5 5   Quadriceps 5 5   Hamstrings 5 5   Ankle dorsiflexor 5 5   Ankle plantarflexor 5 5   Extensor hallucis 5 5       Dermatomal Deficit: Sensation intact throughout all dermatomes     Muscle appearance: Symmetric and normal appearing bulk, contour and tone.    Incision c/d/i    LABS:  Recent Labs     12/19/22  0309 12/21/22  0121   SODIUM 137 135   POTASSIUM 3.9 4.0   CHLORIDE 99 98   CO2 28 27   GLUCOSE 110* 104*   BUN 17 19   CREATININE 0.68 0.75   CALCIUM 8.8 8.6      Recent Labs     12/19/22  0309 12/21/22  0121   WBC 9.6 6.4   RBC 4.63* 4.42*   HEMOGLOBIN 13.8* 13.0*   HEMATOCRIT 40.0* 38.2*   MCV 86.4 86.4   MCH 29.8 29.4   MCHC 34.5 34.0   RDW 38.9 38.5   PLATELETCT 229 224   MPV 9.3 9.0     Lab Results   Component Value Date/Time    PROTHROMBTM 13.5 12/02/2022 12:31 PM    INR 1.04 12/02/2022 12:31 PM            Radiology Studies:     CT cervical spine from 12/18 demonstrates C3-C7 instrumentation with no signs of pullout or failure    Impression and Plan:     Mr. Rodney Arias is a 72 y.o. year old male presenting with pain after posterior cervical fusion     - pain control per primary team  - no issues with instrumentation  - add additional muscle relaxer    Thank you for the consultation. Please do not hesitate contacting me with questions.    Alfonzo Wright III, MD, PhD  Board eligible neurosurgeon  [facility]

## 2023-02-20 DIAGNOSIS — E03.9 HYPOTHYROIDISM (ACQUIRED): ICD-10-CM

## 2023-02-20 DIAGNOSIS — I10 ESSENTIAL HYPERTENSION: ICD-10-CM

## 2023-02-20 DIAGNOSIS — E78.5 HYPERLIPIDEMIA, UNSPECIFIED HYPERLIPIDEMIA TYPE: ICD-10-CM

## 2023-02-22 RX ORDER — LOSARTAN POTASSIUM 100 MG/1
100 TABLET ORAL DAILY
Qty: 90 TABLET | Refills: 0 | Status: SHIPPED | OUTPATIENT
Start: 2023-02-22 | End: 2023-05-25

## 2023-02-22 RX ORDER — AMLODIPINE BESYLATE 5 MG/1
5 TABLET ORAL DAILY
Qty: 90 TABLET | Refills: 0 | OUTPATIENT
Start: 2023-02-22

## 2023-02-22 RX ORDER — LEVOTHYROXINE SODIUM 0.12 MG/1
125 TABLET ORAL
Qty: 90 TABLET | Refills: 0 | OUTPATIENT
Start: 2023-02-22

## 2023-02-22 RX ORDER — AMLODIPINE BESYLATE 5 MG/1
5 TABLET ORAL DAILY
Qty: 90 TABLET | Refills: 0 | Status: SHIPPED | OUTPATIENT
Start: 2023-02-22 | End: 2023-05-18 | Stop reason: SDUPTHER

## 2023-02-22 RX ORDER — LEVOTHYROXINE SODIUM 0.12 MG/1
125 TABLET ORAL
Qty: 90 TABLET | Refills: 0 | Status: SHIPPED | OUTPATIENT
Start: 2023-02-22 | End: 2023-05-18 | Stop reason: SDUPTHER

## 2023-02-22 RX ORDER — ROSUVASTATIN CALCIUM 20 MG/1
20 TABLET, COATED ORAL EVERY EVENING
Qty: 90 TABLET | Refills: 0 | Status: SHIPPED | OUTPATIENT
Start: 2023-02-22 | End: 2023-06-28

## 2023-03-17 RX ORDER — OMEPRAZOLE 40 MG/1
40 CAPSULE, DELAYED RELEASE ORAL
Qty: 90 CAPSULE | Refills: 1 | Status: SHIPPED | OUTPATIENT
Start: 2023-03-17 | End: 2023-08-17 | Stop reason: SDUPTHER

## 2023-05-04 ENCOUNTER — HOSPITAL ENCOUNTER (OUTPATIENT)
Dept: RADIOLOGY | Facility: MEDICAL CENTER | Age: 73
End: 2023-05-04
Attending: NEUROLOGICAL SURGERY
Payer: MEDICARE

## 2023-05-04 DIAGNOSIS — M48.02 SPINAL STENOSIS IN CERVICAL REGION: ICD-10-CM

## 2023-05-04 PROCEDURE — 72156 MRI NECK SPINE W/O & W/DYE: CPT

## 2023-05-04 PROCEDURE — 700117 HCHG RX CONTRAST REV CODE 255

## 2023-05-04 PROCEDURE — 72040 X-RAY EXAM NECK SPINE 2-3 VW: CPT

## 2023-05-04 PROCEDURE — A9579 GAD-BASE MR CONTRAST NOS,1ML: HCPCS

## 2023-05-04 RX ADMIN — GADOTERIDOL 20 ML: 279.3 INJECTION, SOLUTION INTRAVENOUS at 16:04

## 2023-05-25 RX ORDER — LOSARTAN POTASSIUM 100 MG/1
100 TABLET ORAL DAILY
Qty: 90 TABLET | Refills: 0 | Status: SHIPPED | OUTPATIENT
Start: 2023-05-25 | End: 2023-11-07 | Stop reason: SDUPTHER

## 2023-06-27 DIAGNOSIS — E78.5 HYPERLIPIDEMIA, UNSPECIFIED HYPERLIPIDEMIA TYPE: ICD-10-CM

## 2023-06-28 RX ORDER — ROSUVASTATIN CALCIUM 20 MG/1
20 TABLET, COATED ORAL EVERY EVENING
Qty: 90 TABLET | Refills: 0 | Status: SHIPPED | OUTPATIENT
Start: 2023-06-28 | End: 2023-11-07

## 2023-08-15 ENCOUNTER — TELEPHONE (OUTPATIENT)
Dept: HEALTH INFORMATION MANAGEMENT | Facility: OTHER | Age: 73
End: 2023-08-15
Payer: MEDICARE

## 2023-08-17 DIAGNOSIS — I25.10 CORONARY ARTERY DISEASE DUE TO LIPID RICH PLAQUE: ICD-10-CM

## 2023-08-17 DIAGNOSIS — I10 ESSENTIAL HYPERTENSION: ICD-10-CM

## 2023-08-17 DIAGNOSIS — I25.83 CORONARY ARTERY DISEASE DUE TO LIPID RICH PLAQUE: ICD-10-CM

## 2023-08-17 DIAGNOSIS — E03.9 HYPOTHYROIDISM (ACQUIRED): ICD-10-CM

## 2023-08-17 NOTE — TELEPHONE ENCOUNTER
Received request via: Patient    Was the patient seen in the last year in this department? No    Does the patient have an active prescription (recently filled or refills available) for medication(s) requested? No    Does the patient have halfway Plus and need 100 day supply (blood pressure, diabetes and cholesterol meds only)? Patient does not have SCP      Patient requesting medication refills to be sent to [facility] in [place].    Will be back at the end of September. Has scheduled an appt. He is out of his Carvedilol.

## 2023-08-18 RX ORDER — OMEPRAZOLE 40 MG/1
40 CAPSULE, DELAYED RELEASE ORAL
Qty: 90 CAPSULE | Refills: 1 | Status: SHIPPED | OUTPATIENT
Start: 2023-08-18 | End: 2023-12-28 | Stop reason: SDUPTHER

## 2023-08-18 RX ORDER — AMLODIPINE BESYLATE 5 MG/1
5 TABLET ORAL DAILY
Qty: 90 TABLET | Refills: 0 | Status: SHIPPED | OUTPATIENT
Start: 2023-08-18 | End: 2023-09-05 | Stop reason: SDUPTHER

## 2023-08-18 RX ORDER — CARVEDILOL 12.5 MG/1
12.5 TABLET ORAL 2 TIMES DAILY WITH MEALS
Qty: 180 TABLET | Refills: 0 | Status: SHIPPED | OUTPATIENT
Start: 2023-08-18 | End: 2023-11-07 | Stop reason: SDUPTHER

## 2023-08-18 RX ORDER — LEVOTHYROXINE SODIUM 0.12 MG/1
125 TABLET ORAL
Qty: 90 TABLET | Refills: 0 | Status: SHIPPED | OUTPATIENT
Start: 2023-08-18 | End: 2023-09-05 | Stop reason: SDUPTHER

## 2023-09-05 DIAGNOSIS — E03.9 HYPOTHYROIDISM (ACQUIRED): ICD-10-CM

## 2023-09-05 DIAGNOSIS — I10 ESSENTIAL HYPERTENSION: ICD-10-CM

## 2023-09-05 RX ORDER — AMLODIPINE BESYLATE 5 MG/1
5 TABLET ORAL DAILY
Qty: 90 TABLET | Refills: 0 | Status: SHIPPED | OUTPATIENT
Start: 2023-09-05 | End: 2023-10-10

## 2023-09-05 RX ORDER — LEVOTHYROXINE SODIUM 0.12 MG/1
125 TABLET ORAL
Qty: 90 TABLET | Refills: 0 | Status: SHIPPED | OUTPATIENT
Start: 2023-09-05 | End: 2023-12-08

## 2023-09-05 NOTE — TELEPHONE ENCOUNTER
Caller Name: Rodney Arias     Call Back Number: [phone] (home)       How would the patient prefer to be contacted with a response: Phone call OK to leave a detailed message    Patient requesting Medications to be sent to [facility], Refills for requested medications sent for pharmacy in [place] were not picked up due to being expensive.  Please review.

## 2023-10-10 ENCOUNTER — OFFICE VISIT (OUTPATIENT)
Dept: INTERNAL MEDICINE | Facility: OTHER | Age: 73
End: 2023-10-10
Payer: MEDICARE

## 2023-10-10 VITALS
DIASTOLIC BLOOD PRESSURE: 74 MMHG | SYSTOLIC BLOOD PRESSURE: 124 MMHG | HEART RATE: 64 BPM | BODY MASS INDEX: 31.5 KG/M2 | HEIGHT: 71 IN | TEMPERATURE: 97.9 F | OXYGEN SATURATION: 93 % | WEIGHT: 225 LBS

## 2023-10-10 DIAGNOSIS — Z78.9 ALCOHOL USE: ICD-10-CM

## 2023-10-10 DIAGNOSIS — G95.20 CERVICAL SPINAL CORD COMPRESSION (HCC): ICD-10-CM

## 2023-10-10 DIAGNOSIS — R73.02 GLUCOSE INTOLERANCE (IMPAIRED GLUCOSE TOLERANCE): ICD-10-CM

## 2023-10-10 DIAGNOSIS — E03.9 HYPOTHYROIDISM (ACQUIRED): ICD-10-CM

## 2023-10-10 DIAGNOSIS — Z95.5 STENTED CORONARY ARTERY: ICD-10-CM

## 2023-10-10 DIAGNOSIS — I10 ESSENTIAL HYPERTENSION: ICD-10-CM

## 2023-10-10 DIAGNOSIS — E78.5 DYSLIPIDEMIA: ICD-10-CM

## 2023-10-10 PROCEDURE — 3074F SYST BP LT 130 MM HG: CPT

## 2023-10-10 PROCEDURE — 99214 OFFICE O/P EST MOD 30 MIN: CPT | Mod: GC

## 2023-10-10 PROCEDURE — 3078F DIAST BP <80 MM HG: CPT

## 2023-10-10 RX ORDER — HYDROCHLOROTHIAZIDE 12.5 MG/1
12.5 TABLET ORAL DAILY
Qty: 30 TABLET | Refills: 2 | Status: CANCELLED | OUTPATIENT
Start: 2023-10-10

## 2023-10-10 ASSESSMENT — ENCOUNTER SYMPTOMS
NEUROLOGICAL NEGATIVE: 1
RESPIRATORY NEGATIVE: 1
EYES NEGATIVE: 1
GASTROINTESTINAL NEGATIVE: 1
CONSTITUTIONAL NEGATIVE: 1
MUSCULOSKELETAL NEGATIVE: 1
PSYCHIATRIC NEGATIVE: 1
CARDIOVASCULAR NEGATIVE: 1

## 2023-10-10 ASSESSMENT — PATIENT HEALTH QUESTIONNAIRE - PHQ9: CLINICAL INTERPRETATION OF PHQ2 SCORE: 0

## 2023-10-10 ASSESSMENT — FIBROSIS 4 INDEX: FIB4 SCORE: 1.33

## 2023-10-10 NOTE — PROGRESS NOTES
"    Established Patient    Patient Care Team:  Alexander Arias M.D. as PCP - General (Internal Medicine)    Rodney Arias is a 73 y.o. male who presents today with the following Chief Complaint(s): Follow up for Diagnoses of Dyslipidemia, Alcohol use, Hypothyroidism (acquired), Cervical spinal cord compression (HCC), Stented coronary artery, Essential hypertension, and Glucose intolerance (impaired glucose tolerance) were pertinent to this visit.    HPI:  Murray Arias is a 74 y/o male pt w/ PMH CAD, HLD, HTN, hx MI w/ stent placement, thyroid disorder, who is visiting the clinic to establish care today. Patient denies any complaints/concerns at today's visit. States he is primarily here to establish care + refill medication. Patient has a hx spinal stenosis, for which he underwent cervical laminotomy from C3-C7 in December 2022; patient calls it \"a mass that was removed\". Patient states having lost some mobility in his neck due to this issue. He currently has no related complaints, sees Dr. Wright for f/u appointments in relation to the spinal stenosis hx. ROS was positive for neck discomfort, which the patient indicates began after cervical laminotomy in December 2022, otherwise, unremarkable. Patient's blood pressure reading initially at the visit was 157/79. BP reassessed prior to end of the visit, measured at 124/71. Patient denies headaches, blurriness of vision, etc.    /74   Pulse 64   Temp 36.6 °C (97.9 °F) (Temporal)   Ht 1.803 m (5' 11\")   Wt 102 kg (225 lb)   SpO2 93%   BMI 31.38 kg/m²   Review of Systems   Constitutional: Negative.    HENT: Negative.     Eyes: Negative.    Respiratory: Negative.     Cardiovascular: Negative.    Gastrointestinal: Negative.    Genitourinary: Negative.    Musculoskeletal: Negative.    Skin: Negative.    Neurological: Negative.    Endo/Heme/Allergies: Negative.    Psychiatric/Behavioral: Negative.         Past Medical History:   Diagnosis Date    " Arthritis     neck    Bronchitis     CAD (coronary artery disease) 10/21/2011    Disorder of thyroid     Dyslipidemia 10/21/2011    High cholesterol     HTN (hypertension) 10/21/2011    Indigestion     MI (myocardial infarction) (HCC) 10/21/2011    Myocardial infarction (HCC)     with stent placement    Pain     neck and shoulder    Sleep apnea     Stented coronary artery 10/21/2011     Social History     Tobacco Use    Smoking status: Never     Passive exposure: Never    Smokeless tobacco: Never   Vaping Use    Vaping Use: Never used   Substance Use Topics    Alcohol use: Yes     Alcohol/week: 12.6 oz     Types: 21 Cans of beer per week     Comment: 3 beers a day    Drug use: Yes     Comment: marijuana occasionally     Current Outpatient Medications   Medication Sig Dispense Refill    levothyroxine (SYNTHROID) 125 MCG Tab Take 1 Tablet by mouth every morning on an empty stomach. 90 Tablet 0    carvedilol (COREG) 12.5 MG Tab Take 1 Tablet by mouth 2 times a day with meals. 180 Tablet 0    rosuvastatin (CRESTOR) 20 MG Tab TAKE 1 TABLET BY MOUTH EVERY EVENING 90 Tablet 0    losartan (COZAAR) 100 MG Tab TAKE 1 TABLET BY MOUTH EVERY DAY 90 Tablet 0    aspirin 81 MG EC tablet Take 1 Tablet by mouth every day.      POTASSIUM PO Take 1 Tablet by mouth every morning. Indications: OTC Supplement      omeprazole (PRILOSEC) 40 MG delayed-release capsule Take 1 Capsule by mouth every day. (Patient not taking: Reported on 10/10/2023) 90 Capsule 1     No current facility-administered medications for this visit.       Physical Exam  Constitutional:       Appearance: Normal appearance.   HENT:      Head: Normocephalic.      Right Ear: Tympanic membrane, ear canal and external ear normal.      Left Ear: Tympanic membrane, ear canal and external ear normal.      Nose: Nose normal.      Mouth/Throat:      Mouth: Mucous membranes are moist.      Pharynx: Oropharynx is clear.   Eyes:      Extraocular Movements: Extraocular movements  intact.      Conjunctiva/sclera: Conjunctivae normal.   Cardiovascular:      Rate and Rhythm: Normal rate and regular rhythm.      Pulses: Normal pulses.      Heart sounds: Normal heart sounds.   Pulmonary:      Effort: Pulmonary effort is normal.      Breath sounds: Normal breath sounds.   Abdominal:      General: Bowel sounds are normal.      Palpations: Abdomen is soft.   Musculoskeletal:         General: Normal range of motion.      Cervical back: Rigidity present.   Skin:     General: Skin is warm.   Neurological:      General: No focal deficit present.      Mental Status: He is alert. He is disoriented.   Psychiatric:         Mood and Affect: Mood normal.         Behavior: Behavior normal.         Thought Content: Thought content normal.         Judgment: Judgment normal.         Assessment and Plan:   1. Dyslipidemia  Patient has a history of dyslipidemia, for which he currently takes rosuvastatin 20 mg PO daily. Patient had lipid profile completed previously in November 2022, which were w/I reference range. Due to patient's hx CAD, and almost 1 year since last lipid panel completed, ordering lipid panel to assess whether any medication adjustments are required. Will f/u w/ patient at next visit.   - Lipid Profile; Future    2. Alcohol use  Patient has hx of alcohol use, claiming to drink 3-4 drinks daily. His previous lab work up was in 2022, during which liver function tests were within reference range. I am ordering a metabolic panel for evaluation of LFTs, and will manage the patient's alcohol use accordingly. Will f/u at next visit regarding results.  -Comp Metabolic Panel; Future    3. Hypothyroidism (acquired)  Patient has history of hypothyroidism, for which he currently takes levothyroxine 125 mcg once daily. The patient denied fatigue, hair loss, cold/heat intolerance, but mentioned having gained 10-15 lb over the last year. His last thyroid work up was completed in November 2022, and was around the  upper limit of normal, at 5.260. To assess whether he needs medication dose adjustment, I would like to reevaluate his thyroid functions. Will f/u/ at next visit and manage accordingly.  - TSH WITH REFLEX TO FT4; Future     4. Cervical spinal cord compression (HCC)  Pt has hx cervical spine laminotomy in December 2022, which has affected his neck ROM. Pt has referral for PT, and is seeing Dr. Wright twice annually for follow ups. Patient denies any pain, and mentioned slight discomfort. Patient is to be observed, and was advised to scheduled appointments with Dr. Wright for follow ups as recommended by him. No additional management today.    5. Stented coronary artery  Patient has a hx of CAD, MI in 2004/2005 w/ stenting. Has not had any complications since, no complaints today of chest pain, SOB, abdominal pain, arm pain. Patient stated he would follow up w/ cardiologist during next appointment in November 2023. Will discuss further at our next appointment regarding any recommendations/adjustments for management. No current changes being made.    6. Essential hypertension  Due to pt's hx HTN, would like to assess electrolytes, renal function. Ordering metabolic panel to assess these, as last metabolic panel completed was in 2022. Will manage patient accordingly. In addition, patient advised to record home blood pressure readings over next few weeks prior to next appointment, and to bring in BP cuff at the next visit for evaluation of function.   - Comp Metabolic Panel; Future    7. Glucose intolerance (impaired glucose tolerance)  Patient has hx of slightly elevated glucose levels over few measurements, ranging from 100-160 in December 2022. Patient has never previously underwent HbA1c testing, and I would like to evaluate for any prediabetic/diabetic condition and manage accordingly, especially in the setting of prior CAD hx + MI w/ stent. Will evaluate at next visit.  - HEMOGLOBIN A1C; Future        No  problem-specific Assessment & Plan notes found for this encounter.      Orders Placed This Encounter    Lipid Profile    TSH WITH REFLEX TO FT4    Comp Metabolic Panel    HEMOGLOBIN A1C       Return in about 12 weeks (around 1/2/2024).    Alexander Arias M.D. PGY I  Internal Medicine  [facility]

## 2023-10-12 ENCOUNTER — TELEPHONE (OUTPATIENT)
Dept: INTERNAL MEDICINE | Facility: OTHER | Age: 73
End: 2023-10-12
Payer: MEDICARE

## 2023-10-12 NOTE — TELEPHONE ENCOUNTER
Patient had an appt on 10/10 and his wife left a vm on the front machine asking if it was correct that he stop taking the Amlodipine?  She would like a call back [phone]

## 2023-10-13 RX ORDER — AMLODIPINE BESYLATE 5 MG/1
1 TABLET ORAL
COMMUNITY
End: 2023-11-07 | Stop reason: SDUPTHER

## 2023-10-13 NOTE — TELEPHONE ENCOUNTER
Phone Number Called: [phone]    Call outcome: Spoke to patient regarding message below.    Message: pt and wife are aware to continue Amlodipine

## 2023-10-13 NOTE — TELEPHONE ENCOUNTER
VOICEMAIL  1. Caller Name: Murray Arias                   Call Back Number: [phone]    2. Message: questions about Amlodipine     3. Patient approves office to leave a detailed voicemail/MyChart message: N\A

## 2023-10-26 ENCOUNTER — HOSPITAL ENCOUNTER (OUTPATIENT)
Dept: LAB | Facility: MEDICAL CENTER | Age: 73
End: 2023-10-26
Payer: MEDICARE

## 2023-10-26 DIAGNOSIS — I10 ESSENTIAL HYPERTENSION: ICD-10-CM

## 2023-10-26 DIAGNOSIS — Z95.5 STENTED CORONARY ARTERY: ICD-10-CM

## 2023-10-26 DIAGNOSIS — R73.02 GLUCOSE INTOLERANCE (IMPAIRED GLUCOSE TOLERANCE): ICD-10-CM

## 2023-10-26 DIAGNOSIS — E78.5 DYSLIPIDEMIA: ICD-10-CM

## 2023-10-26 DIAGNOSIS — Z78.9 ALCOHOL USE: ICD-10-CM

## 2023-10-26 DIAGNOSIS — E03.9 HYPOTHYROIDISM (ACQUIRED): ICD-10-CM

## 2023-10-26 LAB
ALBUMIN SERPL BCP-MCNC: 4.7 G/DL (ref 3.2–4.9)
ALBUMIN/GLOB SERPL: 1.8 G/DL
ALP SERPL-CCNC: 66 U/L (ref 30–99)
ALT SERPL-CCNC: 15 U/L (ref 2–50)
ANION GAP SERPL CALC-SCNC: 10 MMOL/L (ref 7–16)
AST SERPL-CCNC: 13 U/L (ref 12–45)
BILIRUB SERPL-MCNC: 0.7 MG/DL (ref 0.1–1.5)
BUN SERPL-MCNC: 11 MG/DL (ref 8–22)
CALCIUM ALBUM COR SERPL-MCNC: 8.4 MG/DL (ref 8.5–10.5)
CALCIUM SERPL-MCNC: 9 MG/DL (ref 8.5–10.5)
CHLORIDE SERPL-SCNC: 102 MMOL/L (ref 96–112)
CHOLEST SERPL-MCNC: 156 MG/DL (ref 100–199)
CO2 SERPL-SCNC: 27 MMOL/L (ref 20–33)
CREAT SERPL-MCNC: 0.83 MG/DL (ref 0.5–1.4)
EST. AVERAGE GLUCOSE BLD GHB EST-MCNC: 111 MG/DL
GFR SERPLBLD CREATININE-BSD FMLA CKD-EPI: 91 ML/MIN/1.73 M 2
GLOBULIN SER CALC-MCNC: 2.6 G/DL (ref 1.9–3.5)
GLUCOSE SERPL-MCNC: 107 MG/DL (ref 65–99)
HBA1C MFR BLD: 5.5 % (ref 4–5.6)
HDLC SERPL-MCNC: 51 MG/DL
LDLC SERPL CALC-MCNC: 71 MG/DL
POTASSIUM SERPL-SCNC: 4 MMOL/L (ref 3.6–5.5)
PROT SERPL-MCNC: 7.3 G/DL (ref 6–8.2)
SODIUM SERPL-SCNC: 139 MMOL/L (ref 135–145)
T4 FREE SERPL-MCNC: 1.57 NG/DL (ref 0.93–1.7)
TRIGL SERPL-MCNC: 168 MG/DL (ref 0–149)
TSH SERPL DL<=0.005 MIU/L-ACNC: 6.99 UIU/ML (ref 0.38–5.33)

## 2023-10-26 PROCEDURE — 83036 HEMOGLOBIN GLYCOSYLATED A1C: CPT | Mod: GA

## 2023-10-26 PROCEDURE — 80053 COMPREHEN METABOLIC PANEL: CPT

## 2023-10-26 PROCEDURE — 84443 ASSAY THYROID STIM HORMONE: CPT

## 2023-10-26 PROCEDURE — 84439 ASSAY OF FREE THYROXINE: CPT

## 2023-10-26 PROCEDURE — 36415 COLL VENOUS BLD VENIPUNCTURE: CPT | Mod: GA

## 2023-10-26 PROCEDURE — 80061 LIPID PANEL: CPT

## 2023-11-07 ENCOUNTER — OFFICE VISIT (OUTPATIENT)
Dept: CARDIOLOGY | Facility: MEDICAL CENTER | Age: 73
End: 2023-11-07
Attending: INTERNAL MEDICINE
Payer: MEDICARE

## 2023-11-07 VITALS
DIASTOLIC BLOOD PRESSURE: 78 MMHG | HEART RATE: 69 BPM | OXYGEN SATURATION: 98 % | RESPIRATION RATE: 12 BRPM | WEIGHT: 225 LBS | HEIGHT: 71 IN | SYSTOLIC BLOOD PRESSURE: 124 MMHG | BODY MASS INDEX: 31.5 KG/M2

## 2023-11-07 DIAGNOSIS — I25.10 CORONARY ARTERY DISEASE DUE TO LIPID RICH PLAQUE: ICD-10-CM

## 2023-11-07 DIAGNOSIS — E78.5 HYPERLIPIDEMIA, UNSPECIFIED HYPERLIPIDEMIA TYPE: ICD-10-CM

## 2023-11-07 DIAGNOSIS — I25.83 CORONARY ARTERY DISEASE DUE TO LIPID RICH PLAQUE: ICD-10-CM

## 2023-11-07 DIAGNOSIS — Z95.5 STENTED CORONARY ARTERY: ICD-10-CM

## 2023-11-07 DIAGNOSIS — I10 ESSENTIAL HYPERTENSION: ICD-10-CM

## 2023-11-07 PROBLEM — M48.02 MYELOPATHY CONCURRENT WITH AND DUE TO SPINAL STENOSIS OF CERVICAL REGION (HCC): Status: ACTIVE | Noted: 2022-11-15

## 2023-11-07 PROBLEM — G96.198 SPINAL ARACHNOID CYST: Status: ACTIVE | Noted: 2023-01-02

## 2023-11-07 PROBLEM — G99.2 MYELOPATHY CONCURRENT WITH AND DUE TO SPINAL STENOSIS OF CERVICAL REGION (HCC): Status: ACTIVE | Noted: 2022-11-15

## 2023-11-07 PROCEDURE — 3078F DIAST BP <80 MM HG: CPT | Performed by: INTERNAL MEDICINE

## 2023-11-07 PROCEDURE — 99212 OFFICE O/P EST SF 10 MIN: CPT | Performed by: INTERNAL MEDICINE

## 2023-11-07 PROCEDURE — 3074F SYST BP LT 130 MM HG: CPT | Performed by: INTERNAL MEDICINE

## 2023-11-07 PROCEDURE — 99214 OFFICE O/P EST MOD 30 MIN: CPT | Performed by: INTERNAL MEDICINE

## 2023-11-07 RX ORDER — AMLODIPINE BESYLATE 5 MG/1
5 TABLET ORAL
Qty: 90 TABLET | Refills: 3 | Status: SHIPPED | OUTPATIENT
Start: 2023-11-07

## 2023-11-07 RX ORDER — CARVEDILOL 12.5 MG/1
12.5 TABLET ORAL 2 TIMES DAILY WITH MEALS
Qty: 180 TABLET | Refills: 3 | Status: SHIPPED | OUTPATIENT
Start: 2023-11-07

## 2023-11-07 RX ORDER — ATORVASTATIN CALCIUM 80 MG/1
80 TABLET, FILM COATED ORAL NIGHTLY
Qty: 90 TABLET | Refills: 3 | Status: SHIPPED | OUTPATIENT
Start: 2023-11-07

## 2023-11-07 RX ORDER — LOSARTAN POTASSIUM 100 MG/1
100 TABLET ORAL DAILY
Qty: 90 TABLET | Refills: 3 | Status: SHIPPED | OUTPATIENT
Start: 2023-11-07

## 2023-11-07 RX ORDER — CLOPIDOGREL BISULFATE 75 MG/1
75 TABLET ORAL DAILY
Qty: 90 TABLET | Refills: 3 | Status: SHIPPED | OUTPATIENT
Start: 2023-11-07

## 2023-11-07 ASSESSMENT — FIBROSIS 4 INDEX: FIB4 SCORE: 1.12

## 2023-11-07 NOTE — PROGRESS NOTES
Chief Complaint   Patient presents with    Coronary Artery Disease     FV Dx: Coronary artery disease due to lipid rich plaque       Subjective     Murray Arias is a 73 y.o. male who presents today for follow-up of his history of coronary status post stenting in 2005 for anterior MI    Has HA after eating    Past Medical History:   Diagnosis Date    Arthritis     neck    Bronchitis     CAD (coronary artery disease) 10/21/2011    Disorder of thyroid     Dyslipidemia 10/21/2011    High cholesterol     HTN (hypertension) 10/21/2011    Indigestion     MI (myocardial infarction) (HCC) 10/21/2011    Myocardial infarction (HCC)     with stent placement    Pain     neck and shoulder    Sleep apnea     Stented coronary artery 10/21/2011     Past Surgical History:   Procedure Laterality Date    CERVICAL FUSION POSTERIOR N/A 12/12/2022    Procedure: CERVICAL 3-7 LAMINOTOMY WITH RESECTION OF INTRADURAL TUMOR AND CERVICAL 3-7 FUSION;  Surgeon: Alfonzo Wright M.D.;  Location: SURGERY [facility];  Service: Neurosurgery    LAMINOTOMY N/A 12/12/2022    Procedure: LAMINOTOMY;  Surgeon: Alfonzo Wright M.D.;  Location: SURGERY [facility];  Service: Neurosurgery    SPINAL CORD TUMOR N/A 12/12/2022    Procedure: EXCISION, NEOPLASM, SPINAL CORD;  Surgeon: Alfonzo Wright M.D.;  Location: SURGERY [facility];  Service: Neurosurgery    ANGIOPLASTY      ZZZ CARDIAC CATH       Family History   Problem Relation Age of Onset    Heart Disease Neg Hx         no premature CAD     Social History     Socioeconomic History    Marital status:      Spouse name: Not on file    Number of children: Not on file    Years of education: Not on file    Highest education level: Not on file   Occupational History    Not on file   Tobacco Use    Smoking status: Never     Passive exposure: Never    Smokeless tobacco: Never   Vaping Use    Vaping Use: Never used   Substance and Sexual Activity    Alcohol use: Yes     Alcohol/week: 12.6 oz      Types: 21 Cans of beer per week     Comment: 3 beers a day    Drug use: Yes     Comment: marijuana occasionally    Sexual activity: Not on file   Other Topics Concern    Not on file   Social History Narrative    Not on file     Social Determinants of Health     Financial Resource Strain: Not on file   Food Insecurity: Not on file   Transportation Needs: Not on file   Physical Activity: Not on file   Stress: Not on file   Social Connections: Not on file   Intimate Partner Violence: Not on file   Housing Stability: Not on file     Allergies   Allergen Reactions    Shrimp (Diagnostic)      Pt reports that he gets chest pain if he eats to much     Outpatient Encounter Medications as of 11/7/2023   Medication Sig Dispense Refill    atorvastatin (LIPITOR) 80 MG tablet Take 1 Tablet by mouth every evening. 90 Tablet 3    clopidogrel (PLAVIX) 75 MG Tab Take 1 Tablet by mouth every day. 90 Tablet 3    carvedilol (COREG) 12.5 MG Tab Take 1 Tablet by mouth 2 times a day with meals. 180 Tablet 3    losartan (COZAAR) 100 MG Tab Take 1 Tablet by mouth every day. 90 Tablet 3    amLODIPine (NORVASC) 5 MG Tab Take 1 Tablet by mouth every day. 90 Tablet 3    levothyroxine (SYNTHROID) 125 MCG Tab Take 1 Tablet by mouth every morning on an empty stomach. 90 Tablet 0    omeprazole (PRILOSEC) 40 MG delayed-release capsule Take 1 Capsule by mouth every day. 90 Capsule 1    POTASSIUM PO Take 1 Tablet by mouth every morning. Indications: OTC Supplement      [DISCONTINUED] amLODIPine (NORVASC) 5 MG Tab Take 1 Tablet by mouth every day.      [DISCONTINUED] carvedilol (COREG) 12.5 MG Tab Take 1 Tablet by mouth 2 times a day with meals. 180 Tablet 0    [DISCONTINUED] rosuvastatin (CRESTOR) 20 MG Tab TAKE 1 TABLET BY MOUTH EVERY EVENING 90 Tablet 0    [DISCONTINUED] losartan (COZAAR) 100 MG Tab TAKE 1 TABLET BY MOUTH EVERY DAY 90 Tablet 0    [DISCONTINUED] aspirin 81 MG EC tablet Take 1 Tablet by mouth every day.       No facility-administered  "encounter medications on file as of 11/7/2023.     ROS           Objective     /78 (BP Location: Left arm, Patient Position: Sitting, BP Cuff Size: Adult)   Pulse 69   Resp 12   Ht 1.803 m (5' 11\")   Wt 102 kg (225 lb)   SpO2 98%   BMI 31.38 kg/m²     Physical Exam  Constitutional:       General: He is not in acute distress.     Appearance: He is not diaphoretic.   Eyes:      General: No scleral icterus.  Neck:      Vascular: No JVD.   Cardiovascular:      Rate and Rhythm: Normal rate.      Heart sounds: Normal heart sounds. No murmur heard.     No friction rub. No gallop.   Pulmonary:      Effort: No respiratory distress.      Breath sounds: No wheezing or rales.   Abdominal:      General: Bowel sounds are normal.      Palpations: Abdomen is soft.   Musculoskeletal:      Right lower leg: No edema.      Left lower leg: No edema.   Skin:     Findings: No rash.   Neurological:      Mental Status: He is alert. Mental status is at baseline.   Psychiatric:         Mood and Affect: Mood normal.          We reviewed in person the most recent labs  Recent Results (from the past 5040 hour(s))   HEMOGLOBIN A1C    Collection Time: 10/26/23  7:38 AM   Result Value Ref Range    Glycohemoglobin 5.5 4.0 - 5.6 %    Est Avg Glucose 111 mg/dL   Comp Metabolic Panel    Collection Time: 10/26/23  7:38 AM   Result Value Ref Range    Sodium 139 135 - 145 mmol/L    Potassium 4.0 3.6 - 5.5 mmol/L    Chloride 102 96 - 112 mmol/L    Co2 27 20 - 33 mmol/L    Anion Gap 10.0 7.0 - 16.0    Glucose 107 (H) 65 - 99 mg/dL    Bun 11 8 - 22 mg/dL    Creatinine 0.83 0.50 - 1.40 mg/dL    Calcium 9.0 8.5 - 10.5 mg/dL    Correct Calcium 8.4 (L) 8.5 - 10.5 mg/dL    AST(SGOT) 13 12 - 45 U/L    ALT(SGPT) 15 2 - 50 U/L    Alkaline Phosphatase 66 30 - 99 U/L    Total Bilirubin 0.7 0.1 - 1.5 mg/dL    Albumin 4.7 3.2 - 4.9 g/dL    Total Protein 7.3 6.0 - 8.2 g/dL    Globulin 2.6 1.9 - 3.5 g/dL    A-G Ratio 1.8 g/dL   TSH WITH REFLEX TO FT4    " Collection Time: 10/26/23  7:38 AM   Result Value Ref Range    TSH 6.990 (H) 0.380 - 5.330 uIU/mL   Lipid Profile    Collection Time: 10/26/23  7:38 AM   Result Value Ref Range    Cholesterol,Tot 156 100 - 199 mg/dL    Triglycerides 168 (H) 0 - 149 mg/dL    HDL 51 >=40 mg/dL    LDL 71 <100 mg/dL   ESTIMATED GFR    Collection Time: 10/26/23  7:38 AM   Result Value Ref Range    GFR (CKD-EPI) 91 >60 mL/min/1.73 m 2   FREE THYROXINE    Collection Time: 10/26/23  7:38 AM   Result Value Ref Range    Free T-4 1.57 0.93 - 1.70 ng/dL           Assessment & Plan     1. Coronary artery disease due to lipid rich plaque  clopidogrel (PLAVIX) 75 MG Tab    carvedilol (COREG) 12.5 MG Tab      2. Stented coronary artery  clopidogrel (PLAVIX) 75 MG Tab      3. Hyperlipidemia, unspecified hyperlipidemia type  atorvastatin (LIPITOR) 80 MG tablet      4. Essential hypertension  carvedilol (COREG) 12.5 MG Tab    losartan (COZAAR) 100 MG Tab    amLODIPine (NORVASC) 5 MG Tab          Medical Decision Making: Today's Assessment/Status/Plan:      It was my pleasure to meet with Mr. Arias.    We addressed the management of hypertension at today's visit. Blood pressure is well controlled.  We specifically assessed the labs on hypertension treatment    We addressed the management of dyslipidemia and atherosclerosis at today's visit.   SWITCHED TO ATROVASTATIN 80 mg        We addressed the management of atherosclerotic artery disease.  He is on proper antiplatelet, cholesterol management and beta-blockers as appropriate.  We addressed the potential side effects and laboratory follow-up for these medications.  SWITCHED TO PLAVIX MONOTHERAPY      I will see Mr. Arias back in 1 year time and encouraged him to follow up with us over the phone or electronically using my MyChart as issues arise.    It is my pleasure to participate in the care of Mr. Arias.  Please do not hesitate to contact me with questions or concerns.    Demian Erwin MD  PhD FAC  Cardiologist [facility]    Please note that this dictation was created using voice recognition software. There may be errors I did not discover before finalizing the note.

## 2023-11-28 ENCOUNTER — OFFICE VISIT (OUTPATIENT)
Dept: INTERNAL MEDICINE | Facility: OTHER | Age: 73
End: 2023-11-28
Payer: MEDICARE

## 2023-11-28 VITALS
BODY MASS INDEX: 32.1 KG/M2 | OXYGEN SATURATION: 95 % | HEIGHT: 70 IN | SYSTOLIC BLOOD PRESSURE: 133 MMHG | HEART RATE: 74 BPM | TEMPERATURE: 97.5 F | WEIGHT: 224.2 LBS | DIASTOLIC BLOOD PRESSURE: 78 MMHG

## 2023-11-28 DIAGNOSIS — E03.9 HYPOTHYROIDISM (ACQUIRED): ICD-10-CM

## 2023-11-28 DIAGNOSIS — Z13.6 SCREENING FOR AAA (ABDOMINAL AORTIC ANEURYSM): ICD-10-CM

## 2023-11-28 PROCEDURE — 3075F SYST BP GE 130 - 139MM HG: CPT | Mod: GC

## 2023-11-28 PROCEDURE — 99214 OFFICE O/P EST MOD 30 MIN: CPT | Mod: GC

## 2023-11-28 PROCEDURE — 3078F DIAST BP <80 MM HG: CPT | Mod: GC

## 2023-11-28 ASSESSMENT — ENCOUNTER SYMPTOMS
NEUROLOGICAL NEGATIVE: 1
CARDIOVASCULAR NEGATIVE: 1
PSYCHIATRIC NEGATIVE: 1
GASTROINTESTINAL NEGATIVE: 1
CONSTITUTIONAL NEGATIVE: 1
RESPIRATORY NEGATIVE: 1
EYES NEGATIVE: 1
MUSCULOSKELETAL NEGATIVE: 1

## 2023-11-28 ASSESSMENT — FIBROSIS 4 INDEX: FIB4 SCORE: 1.12

## 2023-11-28 NOTE — PROGRESS NOTES
"    Established Patient    Patient Care Team:  Alexander Arias M.D. as PCP - General (Internal Medicine)    Rodney Arias is a 73 y.o. male who presents today with the following Chief Complaint(s): Follow up for Diagnoses of Screening for AAA (abdominal aortic aneurysm) and Hypothyroidism (acquired) were pertinent to this visit.    HPI:  Murray Arias is a 73 year old male patient with past medical history of CAD S/p stented LAD 2005, MI, HTN, DLD, GERD, NGHIA not on CPAP at home, hypothyroidism, spinal stenosis, hx C3-C7 laminectomy fusioon 12/2022.    Presented today for f/u visit. No complaints/concerns at this visit. Has been f/u with cardiologist, who has adjusted his atorvastatin dose to 80 mg PO daily. Pt also to follow up with Dr. Wright (surgeon) due to history of cervical spine disease.     Patient's TSH levels were elevated, however T4 was within range. Patient states taking medication as counseled, denies symptoms of weight loss, hair loss, anxiety, skin changes.     12-point ROS negative today.     /78 (BP Location: Left arm, Patient Position: Sitting, BP Cuff Size: Large adult)   Pulse 74   Temp 36.4 °C (97.5 °F) (Temporal)   Ht 1.78 m (5' 10.08\")   Wt 102 kg (224 lb 3.2 oz)   SpO2 95%   BMI 32.10 kg/m²   Review of Systems   Constitutional: Negative.    HENT: Negative.     Eyes: Negative.    Respiratory: Negative.     Cardiovascular: Negative.    Gastrointestinal: Negative.    Genitourinary: Negative.    Musculoskeletal: Negative.    Skin: Negative.    Neurological: Negative.    Endo/Heme/Allergies: Negative.    Psychiatric/Behavioral: Negative.         Past Medical History:   Diagnosis Date    Arthritis     neck    Bronchitis     CAD (coronary artery disease) 10/21/2011    Disorder of thyroid     Dyslipidemia 10/21/2011    High cholesterol     HTN (hypertension) 10/21/2011    Indigestion     MI (myocardial infarction) (HCC) 10/21/2011    Myocardial infarction (HCC)     with stent " placement    Pain     neck and shoulder    Sleep apnea     Stented coronary artery 10/21/2011     Social History     Tobacco Use    Smoking status: Never     Passive exposure: Never    Smokeless tobacco: Never   Vaping Use    Vaping Use: Never used   Substance Use Topics    Alcohol use: Yes     Alcohol/week: 12.6 oz     Types: 21 Cans of beer per week     Comment: 3 beers a day    Drug use: Never     Comment: marijuana occasionally     Current Outpatient Medications   Medication Sig Dispense Refill    atorvastatin (LIPITOR) 80 MG tablet Take 1 Tablet by mouth every evening. 90 Tablet 3    clopidogrel (PLAVIX) 75 MG Tab Take 1 Tablet by mouth every day. 90 Tablet 3    carvedilol (COREG) 12.5 MG Tab Take 1 Tablet by mouth 2 times a day with meals. 180 Tablet 3    losartan (COZAAR) 100 MG Tab Take 1 Tablet by mouth every day. 90 Tablet 3    amLODIPine (NORVASC) 5 MG Tab Take 1 Tablet by mouth every day. 90 Tablet 3    levothyroxine (SYNTHROID) 125 MCG Tab Take 1 Tablet by mouth every morning on an empty stomach. 90 Tablet 0    omeprazole (PRILOSEC) 40 MG delayed-release capsule Take 1 Capsule by mouth every day. 90 Capsule 1    POTASSIUM PO Take 1 Tablet by mouth every morning. Indications: OTC Supplement       No current facility-administered medications for this visit.         Physical Exam  Constitutional:       Appearance: Normal appearance. He is obese.   HENT:      Right Ear: Tympanic membrane normal.      Left Ear: Tympanic membrane normal.   Cardiovascular:      Rate and Rhythm: Normal rate and regular rhythm.   Pulmonary:      Effort: Pulmonary effort is normal.      Breath sounds: Normal breath sounds.   Abdominal:      General: Bowel sounds are normal.   Musculoskeletal:         General: Normal range of motion.   Skin:     General: Skin is warm.   Neurological:      General: No focal deficit present.      Mental Status: He is alert and oriented to person, place, and time.   Psychiatric:         Mood and  "Affect: Mood normal.         Behavior: Behavior normal.         Thought Content: Thought content normal.         Judgment: Judgment normal.         Assessment and Plan:   1. Screening for AAA (abdominal aortic aneurysm)  Pt has a history of smoking for 3 years during \"Teen years\", smoking about 1 PPD. Denies smoking since. Ordered US abdomen for one time evaluation of AAA, will reassess patient at next visit.  - US-ABDOMINAL AORTA SCREENING (AAA); Future    2. Hypothyroidism (acquired)  Reordered thyroid blood work up due to abnormal TSH levels as of recent. No changes being made to medication regimen as patient is asymptomatic, T4 levels w/n/l. Will reassess pt for medication changes as needed based off future lab work up.   - TSH+FREE T4      Orders Placed This Encounter    US-ABDOMINAL AORTA SCREENING (AAA)    TSH+FREE T4       Return in about 5 months (around 4/25/2024).    Alexander Arias M.D. PGY I  Internal Medicine  [facility]  "

## 2023-12-08 DIAGNOSIS — E03.9 HYPOTHYROIDISM (ACQUIRED): ICD-10-CM

## 2023-12-08 RX ORDER — LEVOTHYROXINE SODIUM 0.12 MG/1
125 TABLET ORAL
Qty: 90 TABLET | Refills: 0 | Status: SHIPPED | OUTPATIENT
Start: 2023-12-08 | End: 2024-03-14

## 2024-01-02 RX ORDER — OMEPRAZOLE 40 MG/1
40 CAPSULE, DELAYED RELEASE ORAL
Qty: 90 CAPSULE | Refills: 0 | Status: SHIPPED | OUTPATIENT
Start: 2024-01-02

## 2024-03-09 DIAGNOSIS — E03.9 HYPOTHYROIDISM (ACQUIRED): ICD-10-CM

## 2024-03-11 NOTE — TELEPHONE ENCOUNTER
Received request via: Pharmacy    Was the patient seen in the last year in this department? Yes    Does the patient have an active prescription (recently filled or refills available) for medication(s) requested? No    Pharm[facility] Name: [facility]    Does the patient have [facility] Plus and need 100 day supply (blood pressure, diabetes and cholesterol meds only)? Patient does not have SCP

## 2024-03-14 RX ORDER — LEVOTHYROXINE SODIUM 0.12 MG/1
125 TABLET ORAL
Qty: 90 TABLET | Refills: 1 | Status: SHIPPED | OUTPATIENT
Start: 2024-03-14

## 2024-04-09 RX ORDER — OMEPRAZOLE 40 MG/1
40 CAPSULE, DELAYED RELEASE ORAL
Qty: 90 CAPSULE | Refills: 0 | Status: SHIPPED | OUTPATIENT
Start: 2024-04-09

## 2024-04-09 NOTE — TELEPHONE ENCOUNTER
Received request via: Pharmacy    Was the patient seen in the last year in this department? Yes    Does the patient have an active prescription (recently filled or refills available) for medication(s) requested? No    Pharmacy Name:   [id number] - [place], [place] - [id number] AT [facility] [address]          Does the patient have [facility] Plus and need 100 day supply (blood pressure, diabetes and cholesterol meds only)? Patient does not have SCP

## 2024-05-07 ENCOUNTER — OFFICE VISIT (OUTPATIENT)
Dept: INTERNAL MEDICINE | Facility: OTHER | Age: 74
End: 2024-05-07
Payer: MEDICARE

## 2024-05-07 VITALS
SYSTOLIC BLOOD PRESSURE: 137 MMHG | BODY MASS INDEX: 31.44 KG/M2 | DIASTOLIC BLOOD PRESSURE: 71 MMHG | OXYGEN SATURATION: 95 % | WEIGHT: 224.6 LBS | TEMPERATURE: 97.6 F | HEART RATE: 70 BPM | HEIGHT: 71 IN

## 2024-05-07 DIAGNOSIS — I25.83 CORONARY ARTERY DISEASE DUE TO LIPID RICH PLAQUE: ICD-10-CM

## 2024-05-07 DIAGNOSIS — K21.9 GASTROESOPHAGEAL REFLUX DISEASE, UNSPECIFIED WHETHER ESOPHAGITIS PRESENT: ICD-10-CM

## 2024-05-07 DIAGNOSIS — E03.9 HYPOTHYROIDISM (ACQUIRED): ICD-10-CM

## 2024-05-07 DIAGNOSIS — Z13.228 SCREENING FOR METABOLIC DISORDER: ICD-10-CM

## 2024-05-07 DIAGNOSIS — E78.5 HYPERLIPIDEMIA, UNSPECIFIED HYPERLIPIDEMIA TYPE: ICD-10-CM

## 2024-05-07 DIAGNOSIS — I25.10 CORONARY ARTERY DISEASE DUE TO LIPID RICH PLAQUE: ICD-10-CM

## 2024-05-07 DIAGNOSIS — Z95.5 STENTED CORONARY ARTERY: ICD-10-CM

## 2024-05-07 DIAGNOSIS — I10 ESSENTIAL HYPERTENSION: ICD-10-CM

## 2024-05-07 DIAGNOSIS — Z12.11 SCREENING FOR COLON CANCER: ICD-10-CM

## 2024-05-07 PROCEDURE — 99213 OFFICE O/P EST LOW 20 MIN: CPT | Mod: GE

## 2024-05-07 PROCEDURE — 3075F SYST BP GE 130 - 139MM HG: CPT

## 2024-05-07 PROCEDURE — 3078F DIAST BP <80 MM HG: CPT

## 2024-05-07 RX ORDER — CLOPIDOGREL BISULFATE 75 MG/1
75 TABLET ORAL DAILY
Qty: 90 TABLET | Refills: 3 | Status: SHIPPED | OUTPATIENT
Start: 2024-05-07

## 2024-05-07 RX ORDER — LEVOTHYROXINE SODIUM 0.12 MG/1
137 TABLET ORAL
Qty: 30 TABLET | Refills: 2 | Status: SHIPPED | OUTPATIENT
Start: 2024-05-07

## 2024-05-07 RX ORDER — CARVEDILOL 12.5 MG/1
12.5 TABLET ORAL 2 TIMES DAILY WITH MEALS
Qty: 180 TABLET | Refills: 3 | Status: SHIPPED | OUTPATIENT
Start: 2024-05-07

## 2024-05-07 RX ORDER — AMLODIPINE BESYLATE 5 MG/1
5 TABLET ORAL
Qty: 90 TABLET | Refills: 3 | Status: SHIPPED | OUTPATIENT
Start: 2024-05-07

## 2024-05-07 RX ORDER — ATORVASTATIN CALCIUM 80 MG/1
80 TABLET, FILM COATED ORAL NIGHTLY
Qty: 90 TABLET | Refills: 3 | Status: SHIPPED | OUTPATIENT
Start: 2024-05-07

## 2024-05-07 RX ORDER — LOSARTAN POTASSIUM 100 MG/1
100 TABLET ORAL DAILY
Qty: 90 TABLET | Refills: 3 | Status: SHIPPED | OUTPATIENT
Start: 2024-05-07

## 2024-05-07 RX ORDER — OMEPRAZOLE 40 MG/1
40 CAPSULE, DELAYED RELEASE ORAL
Qty: 90 CAPSULE | Refills: 0 | Status: SHIPPED | OUTPATIENT
Start: 2024-05-07

## 2024-05-07 ASSESSMENT — ENCOUNTER SYMPTOMS
GASTROINTESTINAL NEGATIVE: 1
PSYCHIATRIC NEGATIVE: 1
CARDIOVASCULAR NEGATIVE: 1
NEUROLOGICAL NEGATIVE: 1
EYES NEGATIVE: 1
MUSCULOSKELETAL NEGATIVE: 1
CONSTITUTIONAL NEGATIVE: 1
RESPIRATORY NEGATIVE: 1

## 2024-05-07 ASSESSMENT — PATIENT HEALTH QUESTIONNAIRE - PHQ9: CLINICAL INTERPRETATION OF PHQ2 SCORE: 0

## 2024-05-07 ASSESSMENT — FIBROSIS 4 INDEX: FIB4 SCORE: 1.12

## 2024-05-07 NOTE — PATIENT INSTRUCTIONS
[facility] - [address] [address]  Phone: [phone]   Fax: [phone]

## 2024-05-07 NOTE — PROGRESS NOTES
"    Established Patient    Patient Care Team:  Alexander Arias M.D. as PCP - General (Internal Medicine)    Rodney Arias is a 73 y.o. male who presents today with the following Chief Complaint(s): Follow up for Diagnoses of Screening for colon cancer, Hypothyroidism (acquired), Screening for metabolic disorder, Essential hypertension, Hyperlipidemia, unspecified hyperlipidemia type, Coronary artery disease due to lipid rich plaque, Stented coronary artery, and Gastroesophageal reflux disease, unspecified whether esophagitis present were pertinent to this visit.    HPI:  Murray Arias is a 74 y/o male pt here for f/u visit today. Pt denies complaints/concerns. Pt counseled on importance of follow up with cardiology in setting hx MI + CAD s/p stenting. Pt's next f/u in November. Pt also reports having undergone colonoscopy in 2023? However, no findings in patient's chart. Pt referred to GI consultants. Provided with contact information to reschedule as needed as pt unsure of dates. Pt denies other complaints/concerns, primarily visited to get medication refilled.  /71 (BP Location: Left arm, Patient Position: Sitting, BP Cuff Size: Adult)   Pulse 70   Temp 36.4 °C (97.6 °F) (Temporal)   Ht 1.803 m (5' 11\")   Wt 102 kg (224 lb 9.6 oz)   SpO2 95%   BMI 31.33 kg/m²   Review of Systems   Constitutional: Negative.    HENT: Negative.     Eyes: Negative.    Respiratory: Negative.     Cardiovascular: Negative.    Gastrointestinal: Negative.    Genitourinary: Negative.    Musculoskeletal: Negative.    Skin: Negative.    Neurological: Negative.    Endo/Heme/Allergies: Negative.    Psychiatric/Behavioral: Negative.         Past Medical History:   Diagnosis Date    Arthritis     neck    Bronchitis     CAD (coronary artery disease) 10/21/2011    Disorder of thyroid     Dyslipidemia 10/21/2011    High cholesterol     HTN (hypertension) 10/21/2011    Indigestion     MI (myocardial infarction) (HCC) 10/21/2011 "    Myocardial infarction (HCC)     with stent placement    Pain     neck and shoulder    Sleep apnea     Stented coronary artery 10/21/2011     Social History     Tobacco Use    Smoking status: Never     Passive exposure: Never    Smokeless tobacco: Never   Vaping Use    Vaping Use: Never used   Substance Use Topics    Alcohol use: Yes     Alcohol/week: 12.6 oz     Types: 21 Cans of beer per week     Comment: 3 beers a day    Drug use: Never     Comment: marijuana occasionally     Current Outpatient Medications   Medication Sig Dispense Refill    levothyroxine (SYNTHROID) 125 MCG Tab Take 1 Tablet by mouth every morning on an empty stomach. 30 Tablet 2    amLODIPine (NORVASC) 5 MG Tab Take 1 Tablet by mouth every day. 90 Tablet 3    atorvastatin (LIPITOR) 80 MG tablet Take 1 Tablet by mouth every evening. 90 Tablet 3    carvedilol (COREG) 12.5 MG Tab Take 1 Tablet by mouth 2 times a day with meals. 180 Tablet 3    clopidogrel (PLAVIX) 75 MG Tab Take 1 Tablet by mouth every day. 90 Tablet 3    losartan (COZAAR) 100 MG Tab Take 1 Tablet by mouth every day. 90 Tablet 3    omeprazole (PRILOSEC) 40 MG delayed-release capsule Take 1 Capsule by mouth every day. 90 Capsule 0    levothyroxine (SYNTHROID) 125 MCG Tab TAKE 1 TABLET BY MOUTH EVERY MORNING ON AN EMPTY STOMACH 90 Tablet 1    POTASSIUM PO Take 1 Tablet by mouth every morning. Indications: OTC Supplement       No current facility-administered medications for this visit.         Physical Exam  Constitutional:       Appearance: Normal appearance. He is obese.   HENT:      Right Ear: Tympanic membrane and ear canal normal.      Left Ear: Tympanic membrane and ear canal normal.      Nose: Nose normal.   Cardiovascular:      Rate and Rhythm: Normal rate and regular rhythm.      Pulses: Normal pulses.      Heart sounds: Normal heart sounds.   Pulmonary:      Effort: Pulmonary effort is normal.      Breath sounds: Normal breath sounds.   Abdominal:      General: Bowel  sounds are normal.      Palpations: Abdomen is soft.   Musculoskeletal:         General: Normal range of motion.      Cervical back: Normal range of motion and neck supple.   Skin:     General: Skin is warm.   Neurological:      General: No focal deficit present.      Mental Status: He is alert and oriented to person, place, and time. Mental status is at baseline.   Psychiatric:         Mood and Affect: Mood normal.         Thought Content: Thought content normal.         Judgment: Judgment normal.         Assessment and Plan:   1. Screening for colon cancer  Pt provided with information to consult [facility] for colonoscopy as he does not recall the last date of his colonoscopy for colon cancer screening. Will f/u at next visit.    2. Hypothyroidism (acquired)  Pt has hx hypothyroidism. Previously, TSH elevated, T4 wnl. Would like repeat blood work to evaluate for thyroid function. Will f/u at next visit.   - TSH WITH REFLEX TO FT4; Future  - levothyroxine (SYNTHROID) 125 MCG Tab; Take 1 Tablet by mouth every morning on an empty stomach.  Dispense: 30 Tablet; Refill: 2    3. Screening for metabolic disorder  Ordering repeat lab work up including CBC, CMP, lipid profile, and vitamin D levels to assess for metabolic disease, which will be f/u and managed accordingly.  - CBC WITH DIFFERENTIAL; Future  - Comp Metabolic Panel; Future  - Lipid Profile; Future  - VITAMIN D,25 HYDROXY (DEFICIENCY); Future    4. Essential hypertension  Pt requested refills for amlodipine, carvedilol, losartan at this visit.  - amLODIPine (NORVASC) 5 MG Tab; Take 1 Tablet by mouth every day.  Dispense: 90 Tablet; Refill: 3  - carvedilol (COREG) 12.5 MG Tab; Take 1 Tablet by mouth 2 times a day with meals.  Dispense: 180 Tablet; Refill: 3  - losartan (COZAAR) 100 MG Tab; Take 1 Tablet by mouth every day.  Dispense: 90 Tablet; Refill: 3    5. Hyperlipidemia, unspecified hyperlipidemia type  Pt requested refill of atorvastatin at this  visit.  - atorvastatin (LIPITOR) 80 MG tablet; Take 1 Tablet by mouth every evening.  Dispense: 90 Tablet; Refill: 3    6. Coronary artery disease due to lipid rich plaque  Pt requested refills for Coreg 12.5 mg and Plavix 75 mg at this refill. Pt also has a scheduled appointment with cardiology in November 2024. Cardiology follows patient once annually. Will continue to f/u.  - carvedilol (COREG) 12.5 MG Tab; Take 1 Tablet by mouth 2 times a day with meals.  Dispense: 180 Tablet; Refill: 3  - clopidogrel (PLAVIX) 75 MG Tab; Take 1 Tablet by mouth every day.  Dispense: 90 Tablet; Refill: 3    7. Stented coronary artery  Pt requested refill for Plavix at this visit.  - clopidogrel (PLAVIX) 75 MG Tab; Take 1 Tablet by mouth every day.  Dispense: 90 Tablet; Refill: 3    8. Gastroesophageal reflux disease, unspecified whether esophagitis present  Pt requested prilosec 40 mg at this visit.  - omeprazole (PRILOSEC) 40 MG delayed-release capsule; Take 1 Capsule by mouth every day.  Dispense: 90 Capsule; Refill: 0          No problem-specific Assessment & Plan notes found for this encounter.      Orders Placed This Encounter    TSH WITH REFLEX TO FT4    CBC WITH DIFFERENTIAL    Comp Metabolic Panel    Lipid Profile    VITAMIN D,25 HYDROXY (DEFICIENCY)    levothyroxine (SYNTHROID) 125 MCG Tab    amLODIPine (NORVASC) 5 MG Tab    atorvastatin (LIPITOR) 80 MG tablet    carvedilol (COREG) 12.5 MG Tab    clopidogrel (PLAVIX) 75 MG Tab    losartan (COZAAR) 100 MG Tab    omeprazole (PRILOSEC) 40 MG delayed-release capsule       Return in about 12 weeks (around 7/30/2024).    Alexander Arias M.D. PGY I  Internal Medicine  [facility]

## 2024-05-21 ENCOUNTER — HOSPITAL ENCOUNTER (OUTPATIENT)
Dept: RADIOLOGY | Facility: MEDICAL CENTER | Age: 74
End: 2024-05-21
Payer: MEDICARE

## 2024-05-21 DIAGNOSIS — G93.0 CEREBRAL CYSTS: ICD-10-CM

## 2024-05-21 RX ADMIN — GADOTERIDOL 20 ML: 279.3 INJECTION, SOLUTION INTRAVENOUS at 15:40

## 2024-07-22 ENCOUNTER — TELEPHONE (OUTPATIENT)
Dept: INTERNAL MEDICINE | Facility: OTHER | Age: 74
End: 2024-07-22
Payer: MEDICARE

## 2024-10-22 DIAGNOSIS — K21.9 GASTROESOPHAGEAL REFLUX DISEASE, UNSPECIFIED WHETHER ESOPHAGITIS PRESENT: ICD-10-CM

## 2024-10-22 RX ORDER — OMEPRAZOLE 40 MG/1
40 CAPSULE, DELAYED RELEASE ORAL
Qty: 90 CAPSULE | Refills: 0 | Status: SHIPPED | OUTPATIENT
Start: 2024-10-22

## 2024-10-26 ENCOUNTER — HOSPITAL ENCOUNTER (OUTPATIENT)
Dept: LAB | Facility: MEDICAL CENTER | Age: 74
End: 2024-10-26
Payer: MEDICARE

## 2024-10-26 DIAGNOSIS — Z13.228 SCREENING FOR METABOLIC DISORDER: ICD-10-CM

## 2024-10-26 DIAGNOSIS — E03.9 HYPOTHYROIDISM (ACQUIRED): ICD-10-CM

## 2024-10-26 LAB
25(OH)D3 SERPL-MCNC: 35 NG/ML (ref 30–100)
ALBUMIN SERPL BCP-MCNC: 4.7 G/DL (ref 3.2–4.9)
ALBUMIN/GLOB SERPL: 1.9 G/DL
ALP SERPL-CCNC: 73 U/L (ref 30–99)
ALT SERPL-CCNC: 17 U/L (ref 2–50)
ANION GAP SERPL CALC-SCNC: 13 MMOL/L (ref 7–16)
AST SERPL-CCNC: 17 U/L (ref 12–45)
BASOPHILS # BLD AUTO: 1.6 % (ref 0–1.8)
BASOPHILS # BLD: 0.1 K/UL (ref 0–0.12)
BILIRUB SERPL-MCNC: 0.8 MG/DL (ref 0.1–1.5)
BUN SERPL-MCNC: 12 MG/DL (ref 8–22)
CALCIUM ALBUM COR SERPL-MCNC: 8.5 MG/DL (ref 8.5–10.5)
CALCIUM SERPL-MCNC: 9.1 MG/DL (ref 8.5–10.5)
CHLORIDE SERPL-SCNC: 99 MMOL/L (ref 96–112)
CHOLEST SERPL-MCNC: 147 MG/DL (ref 100–199)
CO2 SERPL-SCNC: 28 MMOL/L (ref 20–33)
CREAT SERPL-MCNC: 0.91 MG/DL (ref 0.5–1.4)
EOSINOPHIL # BLD AUTO: 0.21 K/UL (ref 0–0.51)
EOSINOPHIL NFR BLD: 3.5 % (ref 0–6.9)
ERYTHROCYTE [DISTWIDTH] IN BLOOD BY AUTOMATED COUNT: 42.3 FL (ref 35.9–50)
GFR SERPLBLD CREATININE-BSD FMLA CKD-EPI: 87 ML/MIN/1.73 M 2
GLOBULIN SER CALC-MCNC: 2.5 G/DL (ref 1.9–3.5)
GLUCOSE SERPL-MCNC: 102 MG/DL (ref 65–99)
HCT VFR BLD AUTO: 44.6 % (ref 42–52)
HDLC SERPL-MCNC: 43 MG/DL
HGB BLD-MCNC: 15.2 G/DL (ref 14–18)
IMM GRANULOCYTES # BLD AUTO: 0.01 K/UL (ref 0–0.11)
IMM GRANULOCYTES NFR BLD AUTO: 0.2 % (ref 0–0.9)
LDLC SERPL CALC-MCNC: 66 MG/DL
LYMPHOCYTES # BLD AUTO: 1.94 K/UL (ref 1–4.8)
LYMPHOCYTES NFR BLD: 32 % (ref 22–41)
MCH RBC QN AUTO: 30.3 PG (ref 27–33)
MCHC RBC AUTO-ENTMCNC: 34.1 G/DL (ref 32.3–36.5)
MCV RBC AUTO: 88.8 FL (ref 81.4–97.8)
MONOCYTES # BLD AUTO: 0.58 K/UL (ref 0–0.85)
MONOCYTES NFR BLD AUTO: 9.6 % (ref 0–13.4)
NEUTROPHILS # BLD AUTO: 3.23 K/UL (ref 1.82–7.42)
NEUTROPHILS NFR BLD: 53.1 % (ref 44–72)
NRBC # BLD AUTO: 0 K/UL
NRBC BLD-RTO: 0 /100 WBC (ref 0–0.2)
PLATELET # BLD AUTO: 237 K/UL (ref 164–446)
PMV BLD AUTO: 9.8 FL (ref 9–12.9)
POTASSIUM SERPL-SCNC: 3.8 MMOL/L (ref 3.6–5.5)
PROT SERPL-MCNC: 7.2 G/DL (ref 6–8.2)
RBC # BLD AUTO: 5.02 M/UL (ref 4.7–6.1)
SODIUM SERPL-SCNC: 140 MMOL/L (ref 135–145)
TRIGL SERPL-MCNC: 190 MG/DL (ref 0–149)
TSH SERPL DL<=0.005 MIU/L-ACNC: 3.54 UIU/ML (ref 0.38–5.33)
WBC # BLD AUTO: 6.1 K/UL (ref 4.8–10.8)

## 2024-10-26 PROCEDURE — 80061 LIPID PANEL: CPT

## 2024-10-26 PROCEDURE — 80053 COMPREHEN METABOLIC PANEL: CPT

## 2024-10-26 PROCEDURE — 84443 ASSAY THYROID STIM HORMONE: CPT

## 2024-10-26 PROCEDURE — 85025 COMPLETE CBC W/AUTO DIFF WBC: CPT

## 2024-10-26 PROCEDURE — 82306 VITAMIN D 25 HYDROXY: CPT | Mod: GA

## 2024-10-26 PROCEDURE — 36415 COLL VENOUS BLD VENIPUNCTURE: CPT

## 2024-10-29 ENCOUNTER — OFFICE VISIT (OUTPATIENT)
Dept: INTERNAL MEDICINE | Facility: OTHER | Age: 74
End: 2024-10-29
Payer: MEDICARE

## 2024-10-29 VITALS
BODY MASS INDEX: 31.5 KG/M2 | HEIGHT: 71 IN | WEIGHT: 225 LBS | DIASTOLIC BLOOD PRESSURE: 75 MMHG | TEMPERATURE: 98.2 F | HEART RATE: 79 BPM | SYSTOLIC BLOOD PRESSURE: 118 MMHG | OXYGEN SATURATION: 94 %

## 2024-10-29 DIAGNOSIS — Z12.11 COLON CANCER SCREENING: ICD-10-CM

## 2024-10-29 DIAGNOSIS — I25.83 CORONARY ARTERY DISEASE DUE TO LIPID RICH PLAQUE: ICD-10-CM

## 2024-10-29 DIAGNOSIS — Z23 INFLUENZA VACCINATION ADMINISTERED AT CURRENT VISIT: ICD-10-CM

## 2024-10-29 DIAGNOSIS — I25.10 CORONARY ARTERY DISEASE DUE TO LIPID RICH PLAQUE: ICD-10-CM

## 2024-10-29 PROBLEM — Z13.6 SCREENING FOR AAA (ABDOMINAL AORTIC ANEURYSM): Status: RESOLVED | Noted: 2023-11-28 | Resolved: 2024-10-29

## 2024-10-29 ASSESSMENT — ENCOUNTER SYMPTOMS
GASTROINTESTINAL NEGATIVE: 1
RESPIRATORY NEGATIVE: 1
CARDIOVASCULAR NEGATIVE: 1
EYES NEGATIVE: 1
PSYCHIATRIC NEGATIVE: 1
NEUROLOGICAL NEGATIVE: 1
CONSTITUTIONAL NEGATIVE: 1
MUSCULOSKELETAL NEGATIVE: 1

## 2024-10-29 ASSESSMENT — FIBROSIS 4 INDEX: FIB4 SCORE: 1.32

## 2024-11-27 ENCOUNTER — TELEPHONE (OUTPATIENT)
Dept: INTERNAL MEDICINE | Facility: OTHER | Age: 74
End: 2024-11-27
Payer: MEDICARE

## 2024-11-28 NOTE — TELEPHONE ENCOUNTER
Wife Alanna ( + PHI) left a voicemail to ask what meds Murray is taking and what they are for. I left her a detailed message with info.

## 2025-01-20 DIAGNOSIS — K21.9 GASTROESOPHAGEAL REFLUX DISEASE, UNSPECIFIED WHETHER ESOPHAGITIS PRESENT: ICD-10-CM

## 2025-01-20 DIAGNOSIS — E03.9 HYPOTHYROIDISM (ACQUIRED): ICD-10-CM

## 2025-01-20 RX ORDER — OMEPRAZOLE 40 MG/1
40 CAPSULE, DELAYED RELEASE ORAL
Qty: 90 CAPSULE | Refills: 1 | Status: SHIPPED | OUTPATIENT
Start: 2025-01-20 | End: 2025-01-23

## 2025-01-20 RX ORDER — LEVOTHYROXINE SODIUM 125 UG/1
125 TABLET ORAL
Qty: 90 TABLET | Refills: 1 | Status: SHIPPED | OUTPATIENT
Start: 2025-01-20

## 2025-01-20 NOTE — TELEPHONE ENCOUNTER
Received request via: Pharmacy    Was the patient seen in the last year in this department? Yes    Does the patient have an active prescription (recently filled or refills available) for medication(s) requested? No    Pharmacy Name:   To be filled at: [id number] - [place], [place] - [address]          Does the patient have USP Plus and need 100-day supply? (This applies to ALL medications) Patient does not have SCP

## 2025-01-28 ENCOUNTER — TELEPHONE (OUTPATIENT)
Dept: CARDIOLOGY | Facility: MEDICAL CENTER | Age: 75
End: 2025-01-28
Payer: MEDICARE

## 2025-01-28 NOTE — LETTER
January 28, 2025        Rodney Arias  [address]  [place] [address]        Dear Rodney Arias,      Looking through my records we have not seen each other in the past year.  Because of your coronary artery disease, I highly encourage annual follow-up to make sure that we are meeting our shared goals to maximize your care.  Please reach out to my office to schedule an appointment; call [phone] to schedule.        In the interim, I am happy to make sure your cardiac medications are renewed and order labs including lipids.  We also want to make sure your blood pressure is <130/80; so if it is above that goal at home, we are happy to make medication adjustments until you can be seen.      In some cases you may see another Cardiologist and we just met once; please continue your care with your long-term cardiologist.      In some cases you have had to switch cardiologists due to insurance.  I'm sorry if this is the case, we are working with insurances constantly to help them understand how important [facility] can be to their Physician network. Our group continues to perform in the top 10% of cardiovascular care in the country.      In some cases you have moved out of the area.  I wish the best; please make sure you establish with a local cardiologist.      Please feel free to MyChart questions or call our office [phone] during business hours.      Yours in health,      Demian Erwin MD PhD FACC    of Cardiology   [facility]

## 2025-01-29 NOTE — TELEPHONE ENCOUNTER
Received CW recommendations and Letter printed with CW recommendations and mailed to pt mailing address.

## 2025-03-03 ENCOUNTER — PATIENT OUTREACH (OUTPATIENT)
Dept: CARDIOLOGY | Facility: MEDICAL CENTER | Age: 75
End: 2025-03-03
Payer: MEDICARE

## 2025-03-03 DIAGNOSIS — I25.83 CORONARY ARTERY DISEASE DUE TO LIPID RICH PLAQUE: ICD-10-CM

## 2025-03-03 DIAGNOSIS — I25.10 CORONARY ARTERY DISEASE DUE TO LIPID RICH PLAQUE: ICD-10-CM

## 2025-03-12 DIAGNOSIS — I25.10 CORONARY ARTERY DISEASE DUE TO LIPID RICH PLAQUE: ICD-10-CM

## 2025-03-12 DIAGNOSIS — I25.83 CORONARY ARTERY DISEASE DUE TO LIPID RICH PLAQUE: ICD-10-CM

## 2025-03-12 DIAGNOSIS — Z95.5 STENTED CORONARY ARTERY: ICD-10-CM

## 2025-03-12 RX ORDER — CLOPIDOGREL BISULFATE 75 MG/1
75 TABLET ORAL DAILY
Qty: 90 TABLET | Refills: 1 | Status: SHIPPED | OUTPATIENT
Start: 2025-03-12

## 2025-03-12 NOTE — TELEPHONE ENCOUNTER
Phone Number Called: [phone]    Call outcome: Left detailed message for patient. Informed to call back with any additional questions.    Message: informed patient that his medication has been refilled.

## 2025-03-12 NOTE — TELEPHONE ENCOUNTER
Received request via: Patient    Was the patient seen in the last year in this department? Yes    Does the patient have an active prescription (recently filled or refills available) for medication(s) requested? No    Pharmacy Name: [id number] - [place], [place] - [address] [[id number]]     Does the patient have FCI Plus and need 100-day supply? (This applies to ALL medications) Patient does not have SCP    Received this as a call from Murray.  He stated that he has been out of Plavix for the past four days and needs it refilled asap.  I informed him that our providers have 24-72 business hours, and that next time he needs to let us know before he runs out.   Routing to on-call and pool as well, thank you.

## 2025-04-16 ENCOUNTER — OFFICE VISIT (OUTPATIENT)
Dept: INTERNAL MEDICINE | Facility: OTHER | Age: 75
End: 2025-04-16
Payer: MEDICARE

## 2025-04-16 VITALS
DIASTOLIC BLOOD PRESSURE: 69 MMHG | BODY MASS INDEX: 31.08 KG/M2 | HEART RATE: 60 BPM | TEMPERATURE: 97.7 F | HEIGHT: 71 IN | OXYGEN SATURATION: 94 % | WEIGHT: 222 LBS | SYSTOLIC BLOOD PRESSURE: 114 MMHG

## 2025-04-16 DIAGNOSIS — E03.9 HYPOTHYROIDISM (ACQUIRED): ICD-10-CM

## 2025-04-16 DIAGNOSIS — K21.9 GASTROESOPHAGEAL REFLUX DISEASE WITHOUT ESOPHAGITIS: ICD-10-CM

## 2025-04-16 DIAGNOSIS — R73.03 PREDIABETES: ICD-10-CM

## 2025-04-16 DIAGNOSIS — I10 ESSENTIAL HYPERTENSION: ICD-10-CM

## 2025-04-16 DIAGNOSIS — G47.33 OBSTRUCTIVE SLEEP APNEA: ICD-10-CM

## 2025-04-16 DIAGNOSIS — E78.5 DYSLIPIDEMIA: ICD-10-CM

## 2025-04-16 PROCEDURE — 99213 OFFICE O/P EST LOW 20 MIN: CPT | Mod: GE

## 2025-04-16 PROCEDURE — 3074F SYST BP LT 130 MM HG: CPT | Mod: GC

## 2025-04-16 PROCEDURE — 3078F DIAST BP <80 MM HG: CPT | Mod: GC

## 2025-04-16 RX ORDER — FAMOTIDINE 20 MG/1
20 TABLET, FILM COATED ORAL 2 TIMES DAILY
Qty: 60 TABLET | Refills: 2 | Status: SHIPPED | OUTPATIENT
Start: 2025-04-16

## 2025-04-16 ASSESSMENT — ENCOUNTER SYMPTOMS
PSYCHIATRIC NEGATIVE: 1
NEUROLOGICAL NEGATIVE: 1
CONSTITUTIONAL NEGATIVE: 1
GASTROINTESTINAL NEGATIVE: 1
MUSCULOSKELETAL NEGATIVE: 1
EYES NEGATIVE: 1
RESPIRATORY NEGATIVE: 1
CARDIOVASCULAR NEGATIVE: 1

## 2025-04-16 ASSESSMENT — PATIENT HEALTH QUESTIONNAIRE - PHQ9: CLINICAL INTERPRETATION OF PHQ2 SCORE: 0

## 2025-04-16 ASSESSMENT — FIBROSIS 4 INDEX: FIB4 SCORE: 1.32

## 2025-04-16 NOTE — LETTER
[facility]  Alexander Arias M.D.  [address] [place] [address]  Fax: [phone]   Authorization for Release/Disclosure of   Protected Health Information   Name: RODNEY OSEGUERA : 1950 SSN: [id number]   Address: [address] [address] Phone:    [phone] (home)    I authorize the entity listed below to release/disclose the PHI below to:   [facility]/Alexander Arias M.D. and Alexander Arias M.D.   Provider or Entity Name:     Address   City, State, [facility]   Phone:      Fax:     Reason for request: continuity of care   Information to be released:    [  ] LAST COLONOSCOPY,  including any PATH REPORT and follow-up  [  ] LAST FIT/COLOGUARD RESULT [  ] LAST DEXA  [  ] LAST MAMMOGRAM  [  ] LAST PAP  [  ] LAST LABS [  ] RETINA EXAM REPORT  [  ] IMMUNIZATION RECORDS  [  ] Release all info      [  ] Check here and initial the line next to each item to release ALL health information INCLUDING  _____ Care and treatment for drug and / or alcohol abuse  _____ HIV testing, infection status, or AIDS  _____ Genetic Testing    DATES OF SERVICE OR TIME PERIOD TO BE DISCLOSED: _____________  I understand and acknowledge that:  * This Authorization may be revoked at any time by you in writing, except if your health information has already been used or disclosed.  * Your health information that will be used or disclosed as a result of you signing this authorization could be re-disclosed by the recipient. If this occurs, your re-disclosed health information may no longer be protected by State or Federal laws.  * You may refuse to sign this Authorization. Your refusal will not affect your ability to obtain treatment.  * This Authorization becomes effective upon signing and will  on (date) __________.      If no date is indicated, this Authorization will  one (1) year from the signature date.    Name: Rodney Oseguera  Signature: Date:   2025     PLEASE FAX REQUESTED RECORDS  BACK TO: [phone]

## 2025-04-16 NOTE — PROGRESS NOTES
"    Established Patient    Patient Care Team:  Alexander Arias M.D. as PCP - General (Internal Medicine)    Rodney Arias is a 74 y.o. male who presents today with the following Chief Complaint(s): Follow up for Diagnoses of Obstructive sleep apnea, Essential hypertension, Dyslipidemia, Gastroesophageal reflux disease without esophagitis, Prediabetes, and Hypothyroidism (acquired) were pertinent to this visit.    HPI:  Murray Arias is a 75 y/o male patient who presented today for a f/u visit. Denies complaints/concerns. Is feeling well overall. Patient is compliant with medication. Pt has a cardiologist for hx. MI, CAD, whom he follows with, but has not seen in over a year due to scheduling issues, but reports will reach out to schedule a visit.    /69 (BP Location: Left arm, Patient Position: Sitting, BP Cuff Size: Adult)   Pulse 60   Temp 36.5 °C (97.7 °F) (Temporal)   Ht 1.803 m (5' 11\")   Wt 101 kg (222 lb)   SpO2 94%   BMI 30.96 kg/m²   Review of Systems   Constitutional: Negative.    HENT: Negative.     Eyes: Negative.    Respiratory: Negative.     Cardiovascular: Negative.    Gastrointestinal: Negative.    Genitourinary: Negative.    Musculoskeletal: Negative.    Skin: Negative.    Neurological: Negative.    Endo/Heme/Allergies: Negative.    Psychiatric/Behavioral: Negative.         Past Medical History:   Diagnosis Date    Arthritis     neck    Bronchitis     CAD (coronary artery disease) 10/21/2011    Disorder of thyroid     Dyslipidemia 10/21/2011    High cholesterol     HTN (hypertension) 10/21/2011    Indigestion     MI (myocardial infarction) (HCC) 10/21/2011    Myocardial infarction (HCC)     with stent placement    Pain     neck and shoulder    Sleep apnea     Stented coronary artery 10/21/2011     Social History     Tobacco Use    Smoking status: Never     Passive exposure: Never    Smokeless tobacco: Never   Vaping Use    Vaping status: Never Used   Substance Use Topics    " Alcohol use: Yes     Alcohol/week: 12.6 oz     Types: 21 Cans of beer per week     Comment: 3 beers a day    Drug use: Never     Comment: marijuana occasionally     Current Outpatient Medications   Medication Sig Dispense Refill    famotidine (PEPCID) 20 MG Tab Take 1 Tablet by mouth 2 times a day. 60 Tablet 2    clopidogrel (PLAVIX) 75 MG Tab Take 1 Tablet by mouth every day. 90 Tablet 1    levothyroxine (SYNTHROID) 125 MCG Tab TAKE 1 TABLET BY MOUTH EVERY MORNING ON AN EMPTY STOMACH 90 Tablet 1    amLODIPine (NORVASC) 5 MG Tab Take 1 Tablet by mouth every day. 90 Tablet 3    atorvastatin (LIPITOR) 80 MG tablet Take 1 Tablet by mouth every evening. 90 Tablet 3    carvedilol (COREG) 12.5 MG Tab Take 1 Tablet by mouth 2 times a day with meals. 180 Tablet 3    losartan (COZAAR) 100 MG Tab Take 1 Tablet by mouth every day. 90 Tablet 3    POTASSIUM PO Take 1 Tablet by mouth every morning. Indications: OTC Supplement       No current facility-administered medications for this visit.         Physical Exam  Constitutional:       Appearance: Normal appearance.   HENT:      Right Ear: External ear normal.      Left Ear: External ear normal.      Nose: Nose normal.   Cardiovascular:      Rate and Rhythm: Normal rate and regular rhythm.      Pulses: Normal pulses.      Heart sounds: Normal heart sounds. No murmur heard.     No friction rub. No gallop.   Pulmonary:      Effort: Pulmonary effort is normal. No respiratory distress.      Breath sounds: Normal breath sounds. No stridor. No wheezing, rhonchi or rales.   Chest:      Chest wall: No tenderness.   Abdominal:      General: Bowel sounds are normal. There is no distension.      Palpations: Abdomen is soft. There is no mass.      Tenderness: There is no abdominal tenderness. There is no right CVA tenderness, left CVA tenderness, guarding or rebound.      Hernia: No hernia is present.   Musculoskeletal:         General: No swelling, tenderness, deformity or signs of injury.  Normal range of motion.      Cervical back: Normal range of motion and neck supple.      Right lower leg: No edema.      Left lower leg: No edema.   Skin:     General: Skin is warm.   Neurological:      General: No focal deficit present.      Mental Status: He is alert and oriented to person, place, and time. Mental status is at baseline.   Psychiatric:         Mood and Affect: Mood normal.         Thought Content: Thought content normal.         Judgment: Judgment normal.         Assessment and Plan:   1. Obstructive sleep apnea  Hx. NGHIA; however, at this time, patient denies anyone having complained to him about snoring. Pt denies fatigue, exhaustion. Reports that he sleeps well, and does not feel the need to nap daily. No dyspnea, orthopnea, does not recall episodes of PND. Previously used CPAP, not currently willing to use again due to mask fitting issues. Continue to monitor for symptoms. STOPBANG 3-4.     2. Essential hypertension  Hx. HTN, ordering CMP at this time to assess kidney functions. Well controlled on current regimen.  - Comp Metabolic Panel; Future    3. Dyslipidemia  Hx. DLD, optimized in setting of CAD. Ordering repeat lipid profile to assess for changes. Will adjust management as needed.  - Lipid Profile; Future    4. Gastroesophageal reflux disease without esophagitis  Hx. GERD, previously on PPI. In setting of plavix use, counseled patient on d/cing PPI due to P450 effects reducing potency of plavix in a patient with vascular disease. Prescribing famotidine at this time for management of GERD.  - famotidine (PEPCID) 20 MG Tab; Take 1 Tablet by mouth 2 times a day.  Dispense: 60 Tablet; Refill: 2    5. Prediabetes  Hx. Prediabetes, repeating A1c at this time for evaluation.  - HEMOGLOBIN A1C; Future    6. Hypothyroidism (acquired)  Pt denies symptoms of hyper/hypothyroidism at this time. Takes synthroid 125 mcg daily. AT this time, ordering repeat TSH for evaluation of improvement or  progression of disease. Will f/u at next visit.  - TSH WITH REFLEX TO FT4; Future        Orders Placed This Encounter    TSH WITH REFLEX TO FT4    Comp Metabolic Panel    HEMOGLOBIN A1C    Lipid Profile    famotidine (PEPCID) 20 MG Tab       Return in about 6 months (around 10/16/2025).    Alexander Arias M.D. PGY II  Internal Medicine  [facility]

## 2025-04-16 NOTE — PATIENT INSTRUCTIONS
Referral #:  [id number]   Referred-To Provider      Referred-By Provider:   Gastroenterology    Alexander Arias M.D.    [facility]       [address] [facility] [address]  [phone] [address] [facility] [address]  [phone]     Referral Start Date:  10/29/2024   Referral End Date:   10/29/2025

## 2025-04-20 DIAGNOSIS — E78.5 HYPERLIPIDEMIA, UNSPECIFIED HYPERLIPIDEMIA TYPE: ICD-10-CM

## 2025-04-20 DIAGNOSIS — I10 ESSENTIAL HYPERTENSION: ICD-10-CM

## 2025-04-21 NOTE — TELEPHONE ENCOUNTER
Received request via: Pharmacy    Was the patient seen in the last year in this department? Yes    Does the patient have an active prescription (recently filled or refills available) for medication(s) requested? No    Pharmacy Name:   To be filled at: [facility] #[id number] - [place], [place] - [address]          Does the patient have USP Plus and need 100-day supply? (This applies to ALL medications) Patient does not have SCP

## 2025-04-22 RX ORDER — ATORVASTATIN CALCIUM 80 MG/1
80 TABLET, FILM COATED ORAL NIGHTLY
Qty: 90 TABLET | Refills: 1 | Status: SHIPPED | OUTPATIENT
Start: 2025-04-22

## 2025-04-22 RX ORDER — LOSARTAN POTASSIUM 100 MG/1
100 TABLET ORAL DAILY
Qty: 90 TABLET | Refills: 1 | Status: SHIPPED | OUTPATIENT
Start: 2025-04-22

## 2025-05-14 ENCOUNTER — TELEPHONE (OUTPATIENT)
Dept: INTERNAL MEDICINE | Facility: OTHER | Age: 75
End: 2025-05-14
Payer: MEDICARE

## 2025-05-14 NOTE — TELEPHONE ENCOUNTER
Patients wife called the office. Famotidine is not working for his heartburn. Tried Omeprazole 20 mg OTC but would Omeprazole 40 mg prescribed and sent to pharmacy. Pharmacy has been confirmed to [facility]

## 2025-05-16 DIAGNOSIS — K21.9 GASTROESOPHAGEAL REFLUX DISEASE WITHOUT ESOPHAGITIS: Primary | ICD-10-CM

## 2025-05-16 RX ORDER — FAMOTIDINE 40 MG/1
40 TABLET, FILM COATED ORAL DAILY
Qty: 90 TABLET | Refills: 0 | Status: SHIPPED | OUTPATIENT
Start: 2025-05-16 | End: 2025-05-20

## 2025-05-16 NOTE — TELEPHONE ENCOUNTER
Phone Number Called: [phone] (home)      Call outcome: Spoke to patient regarding message below.    Message: Spoke to patient's wife she knows about risks of medication. Requesting Pepcid 40 mg qty 90 day supply route to [facility] in [place].Patient will make an appointment with cardiology

## 2025-05-16 NOTE — TELEPHONE ENCOUNTER
Phone Number Called: [phone] (home)      Call outcome: Spoke to patient regarding message below.    Message: Patient notified refill route to [facility] pharmacy.

## 2025-05-20 ENCOUNTER — OFFICE VISIT (OUTPATIENT)
Dept: CARDIOLOGY | Facility: MEDICAL CENTER | Age: 75
End: 2025-05-20
Attending: INTERNAL MEDICINE
Payer: MEDICARE

## 2025-05-20 VITALS
DIASTOLIC BLOOD PRESSURE: 60 MMHG | BODY MASS INDEX: 31.36 KG/M2 | RESPIRATION RATE: 16 BRPM | OXYGEN SATURATION: 94 % | HEIGHT: 71 IN | WEIGHT: 224 LBS | SYSTOLIC BLOOD PRESSURE: 110 MMHG | HEART RATE: 63 BPM

## 2025-05-20 DIAGNOSIS — I10 ESSENTIAL HYPERTENSION: ICD-10-CM

## 2025-05-20 DIAGNOSIS — E78.5 DYSLIPIDEMIA: ICD-10-CM

## 2025-05-20 DIAGNOSIS — I25.83 CORONARY ARTERY DISEASE DUE TO LIPID RICH PLAQUE: Primary | ICD-10-CM

## 2025-05-20 DIAGNOSIS — E78.5 HYPERLIPIDEMIA, UNSPECIFIED HYPERLIPIDEMIA TYPE: ICD-10-CM

## 2025-05-20 DIAGNOSIS — Z95.5 STENTED CORONARY ARTERY: Chronic | ICD-10-CM

## 2025-05-20 DIAGNOSIS — I25.10 CORONARY ARTERY DISEASE DUE TO LIPID RICH PLAQUE: Primary | ICD-10-CM

## 2025-05-20 DIAGNOSIS — K21.9 GASTROESOPHAGEAL REFLUX DISEASE WITHOUT ESOPHAGITIS: ICD-10-CM

## 2025-05-20 PROCEDURE — G2211 COMPLEX E/M VISIT ADD ON: HCPCS | Performed by: INTERNAL MEDICINE

## 2025-05-20 PROCEDURE — 3078F DIAST BP <80 MM HG: CPT | Performed by: INTERNAL MEDICINE

## 2025-05-20 PROCEDURE — 3074F SYST BP LT 130 MM HG: CPT | Performed by: INTERNAL MEDICINE

## 2025-05-20 PROCEDURE — 99214 OFFICE O/P EST MOD 30 MIN: CPT | Performed by: INTERNAL MEDICINE

## 2025-05-20 PROCEDURE — 99213 OFFICE O/P EST LOW 20 MIN: CPT | Performed by: INTERNAL MEDICINE

## 2025-05-20 RX ORDER — ATORVASTATIN CALCIUM 80 MG/1
80 TABLET, FILM COATED ORAL NIGHTLY
Qty: 90 TABLET | Refills: 3 | Status: SHIPPED | OUTPATIENT
Start: 2025-05-20

## 2025-05-20 RX ORDER — CARVEDILOL 12.5 MG/1
12.5 TABLET ORAL 2 TIMES DAILY WITH MEALS
Qty: 180 TABLET | Refills: 3 | Status: SHIPPED | OUTPATIENT
Start: 2025-05-20

## 2025-05-20 RX ORDER — AMLODIPINE BESYLATE 5 MG/1
5 TABLET ORAL
Qty: 90 TABLET | Refills: 3 | Status: SHIPPED | OUTPATIENT
Start: 2025-05-20

## 2025-05-20 RX ORDER — OMEPRAZOLE 40 MG/1
40 CAPSULE, DELAYED RELEASE ORAL DAILY
Qty: 90 CAPSULE | Refills: 3 | Status: SHIPPED | OUTPATIENT
Start: 2025-05-20

## 2025-05-20 RX ORDER — LOSARTAN POTASSIUM 100 MG/1
100 TABLET ORAL DAILY
Qty: 90 TABLET | Refills: 3 | Status: SHIPPED | OUTPATIENT
Start: 2025-05-20

## 2025-05-20 ASSESSMENT — FIBROSIS 4 INDEX: FIB4 SCORE: 1.34

## 2025-05-20 NOTE — PROGRESS NOTES
"Chief Complaint   Patient presents with    Follow-Up     FV DX:Coronary artery disease due to lipid rich plaque    Hypertension       Subjective     Murray Arias is a 75 y.o. male who presents today for follow-up of his history of coronary status post stenting in 2005 for anterior MI     Had more GERD with being off omeprazole    He reminded me of serious allergy to plavix back at the time of his original stent with tongue     Past Medical History[1]  Past Surgical History[2]  Family History   Problem Relation Age of Onset    Heart Disease Neg Hx         no premature CAD     Social History     Socioeconomic History    Marital status:      Spouse name: Not on file    Number of children: Not on file    Years of education: Not on file    Highest education level: Not on file   Occupational History    Not on file   Tobacco Use    Smoking status: Never     Passive exposure: Never    Smokeless tobacco: Never   Vaping Use    Vaping status: Never Used   Substance and Sexual Activity    Alcohol use: Yes     Alcohol/week: 12.6 oz     Types: 21 Cans of beer per week     Comment: 3 beers a day    Drug use: Never     Comment: marijuana occasionally    Sexual activity: Not on file   Other Topics Concern    Not on file   Social History Narrative    Not on file     Social Drivers of Health     Financial Resource Strain: Not on file   Food Insecurity: Not on file   Transportation Needs: Not on file   Physical Activity: Not on file   Stress: Not on file   Social Connections: Not on file   Intimate Partner Violence: Not on file   Housing Stability: Not on file     Allergies[3]  Encounter Medications[4]  ROS           Objective     /60 (BP Location: Left arm, Patient Position: Sitting, BP Cuff Size: Adult)   Pulse 63   Resp 16   Ht 1.803 m (5' 11\")   Wt 102 kg (224 lb)   SpO2 94%   BMI 31.24 kg/m²     Physical Exam  Constitutional:       General: He is not in acute distress.     Appearance: He is not " diaphoretic.   Eyes:      General: No scleral icterus.  Neck:      Vascular: No JVD.   Cardiovascular:      Rate and Rhythm: Normal rate.      Heart sounds: Normal heart sounds. No murmur heard.     No friction rub. No gallop.   Pulmonary:      Effort: No respiratory distress.      Breath sounds: No wheezing or rales.   Abdominal:      General: Bowel sounds are normal.      Palpations: Abdomen is soft.   Musculoskeletal:      Right lower leg: No edema.      Left lower leg: No edema.   Skin:     Findings: No rash.   Neurological:      Mental Status: He is alert. Mental status is at baseline.   Psychiatric:         Mood and Affect: Mood normal.            We reviewed in person the most recent labs  Recent Results (from the past 30 weeks)   TSH WITH REFLEX TO FT4    Collection Time: 10/26/24  9:34 AM   Result Value Ref Range    TSH 3.540 0.380 - 5.330 uIU/mL   CBC WITH DIFFERENTIAL    Collection Time: 10/26/24  9:34 AM   Result Value Ref Range    WBC 6.1 4.8 - 10.8 K/uL    RBC 5.02 4.70 - 6.10 M/uL    Hemoglobin 15.2 14.0 - 18.0 g/dL    Hematocrit 44.6 42.0 - 52.0 %    MCV 88.8 81.4 - 97.8 fL    MCH 30.3 27.0 - 33.0 pg    MCHC 34.1 32.3 - 36.5 g/dL    RDW 42.3 35.9 - 50.0 fL    Platelet Count 237 164 - 446 K/uL    MPV 9.8 9.0 - 12.9 fL    Neutrophils-Polys 53.10 44.00 - 72.00 %    Lymphocytes 32.00 22.00 - 41.00 %    Monocytes 9.60 0.00 - 13.40 %    Eosinophils 3.50 0.00 - 6.90 %    Basophils 1.60 0.00 - 1.80 %    Immature Granulocytes 0.20 0.00 - 0.90 %    Nucleated RBC 0.00 0.00 - 0.20 /100 WBC    Neutrophils (Absolute) 3.23 1.82 - 7.42 K/uL    Lymphs (Absolute) 1.94 1.00 - 4.80 K/uL    Monos (Absolute) 0.58 0.00 - 0.85 K/uL    Eos (Absolute) 0.21 0.00 - 0.51 K/uL    Baso (Absolute) 0.10 0.00 - 0.12 K/uL    Immature Granulocytes (abs) 0.01 0.00 - 0.11 K/uL    NRBC (Absolute) 0.00 K/uL   Comp Metabolic Panel    Collection Time: 10/26/24  9:34 AM   Result Value Ref Range    Sodium 140 135 - 145 mmol/L    Potassium 3.8  3.6 - 5.5 mmol/L    Chloride 99 96 - 112 mmol/L    Co2 28 20 - 33 mmol/L    Anion Gap 13.0 7.0 - 16.0    Glucose 102 (H) 65 - 99 mg/dL    Bun 12 8 - 22 mg/dL    Creatinine 0.91 0.50 - 1.40 mg/dL    Calcium 9.1 8.5 - 10.5 mg/dL    Correct Calcium 8.5 8.5 - 10.5 mg/dL    AST(SGOT) 17 12 - 45 U/L    ALT(SGPT) 17 2 - 50 U/L    Alkaline Phosphatase 73 30 - 99 U/L    Total Bilirubin 0.8 0.1 - 1.5 mg/dL    Albumin 4.7 3.2 - 4.9 g/dL    Total Protein 7.2 6.0 - 8.2 g/dL    Globulin 2.5 1.9 - 3.5 g/dL    A-G Ratio 1.9 g/dL   Lipid Profile    Collection Time: 10/26/24  9:34 AM   Result Value Ref Range    Cholesterol,Tot 147 100 - 199 mg/dL    Triglycerides 190 (H) 0 - 149 mg/dL    HDL 43 >=40 mg/dL    LDL 66 <100 mg/dL   VITAMIN D,25 HYDROXY (DEFICIENCY)    Collection Time: 10/26/24  9:34 AM   Result Value Ref Range    25-Hydroxy   Vitamin D 25 35 30 - 100 ng/mL   ESTIMATED GFR    Collection Time: 10/26/24  9:34 AM   Result Value Ref Range    GFR (CKD-EPI) 87 >60 mL/min/1.73 m 2         Assessment & Plan     1. Coronary artery disease due to lipid rich plaque  carvedilol (COREG) 12.5 MG Tab    CBC WITHOUT DIFFERENTIAL    Comp Metabolic Panel      2. Stented coronary artery - 2005 LAD        3. Essential hypertension  losartan (COZAAR) 100 MG Tab    amLODIPine (NORVASC) 5 MG Tab    carvedilol (COREG) 12.5 MG Tab      4. Dyslipidemia  Lipid Profile      5. Gastroesophageal reflux disease without esophagitis  omeprazole (PRILOSEC) 40 MG delayed-release capsule      6. Hyperlipidemia, unspecified hyperlipidemia type  atorvastatin (LIPITOR) 80 MG tablet          Medical Decision Making: Today's Assessment/Status/Plan:        It was my pleasure to meet with Mr. Arias.    We addressed the management of hypertension at today's visit. Blood pressure is well controlled.  We specifically assessed the labs on hypertension treatment    We addressed the management of dyslipidemia and atherosclerosis at today's visit. He is on appropriate  lipid lowering medication.    We addressed the management of atherosclerotic artery disease.  He is on proper antiplatelet, cholesterol management as appropriate.  We addressed the potential side effects and laboratory follow-up for these medications.    I will see Mr. Arias back in 1 year time and encouraged him to follow up with us over the phone or electronically using my MyChart as issues arise.    It is my pleasure to participate in the care of Mr. Arias.  Please do not hesitate to contact me with questions or concerns.    Demian Erwin MD PhD [facility]  Cardiologist [facility]    Please note that this dictation was created using voice recognition software. There may be errors I did not discover before finalizing the note.     () Today's E/M visit is associated with medical care services that serve as the continuing focal point for all needed health care services and/or with medical care services that  are part of ongoing care related to a patient's single, serious condition, or a complex condition: This includes  furnishing services to patients on an ongoing basis that result in care that is personalized  to the patient. The services result in a comprehensive, longitudinal, and continuous  relationship with the patient and involve delivery of team-based care that is accessible, coordinated with other practitioners and providers, and integrated with the broader health  care landscape.                        [1]   Past Medical History:  Diagnosis Date    Arthritis     neck    Bronchitis     CAD (coronary artery disease) 10/21/2011    Disorder of thyroid     Dyslipidemia 10/21/2011    High cholesterol     HTN (hypertension) 10/21/2011    Indigestion     MI (myocardial infarction) (HCC) 10/21/2011    Myocardial infarction (HCC)     with stent placement    Pain     neck and shoulder    Sleep apnea     Stented coronary artery 10/21/2011   [2]   Past Surgical  History:  Procedure Laterality Date    CERVICAL FUSION POSTERIOR N/A 12/12/2022    Procedure: CERVICAL 3-7 LAMINOTOMY WITH RESECTION OF INTRADURAL TUMOR AND CERVICAL 3-7 FUSION;  Surgeon: Alfonzo Wright M.D.;  Location: SURGERY [facility];  Service: Neurosurgery    LAMINOTOMY N/A 12/12/2022    Procedure: LAMINOTOMY;  Surgeon: Alfonzo Wright M.D.;  Location: SURGERY [facility];  Service: Neurosurgery    SPINAL CORD TUMOR N/A 12/12/2022    Procedure: EXCISION, NEOPLASM, SPINAL CORD;  Surgeon: Alfonzo Wright M.D.;  Location: SURGERY [facility];  Service: Neurosurgery    ANGIOPLASTY      ZZZ CARDIAC CATH     [3]   Allergies  Allergen Reactions    Shrimp (Diagnostic)      Pt reports that he gets chest pain if he eats to much    Plavix [Clopidogrel]      Tongue swelling   [4]   Outpatient Encounter Medications as of 5/20/2025   Medication Sig Dispense Refill    ASPIRIN 81 PO Take 81 mg by mouth.      omeprazole (PRILOSEC) 40 MG delayed-release capsule Take 1 Capsule by mouth every day. 90 Capsule 3    atorvastatin (LIPITOR) 80 MG tablet Take 1 Tablet by mouth every evening. 90 Tablet 3    losartan (COZAAR) 100 MG Tab Take 1 Tablet by mouth every day. 90 Tablet 3    amLODIPine (NORVASC) 5 MG Tab Take 1 Tablet by mouth every day. 90 Tablet 3    carvedilol (COREG) 12.5 MG Tab Take 1 Tablet by mouth 2 times a day with meals. 180 Tablet 3    levothyroxine (SYNTHROID) 125 MCG Tab TAKE 1 TABLET BY MOUTH EVERY MORNING ON AN EMPTY STOMACH 90 Tablet 1    [DISCONTINUED] OMEPRAZOLE PO Take  by mouth.      [DISCONTINUED] famotidine (PEPCID) 40 MG Tab Take 1 Tablet by mouth every day. (Patient not taking: Reported on 5/20/2025) 90 Tablet 0    [DISCONTINUED] atorvastatin (LIPITOR) 80 MG tablet TAKE 1 TABLET BY MOUTH EVERY EVENING 90 Tablet 1    [DISCONTINUED] losartan (COZAAR) 100 MG Tab TAKE 1 TABLET BY MOUTH EVERY DAY 90 Tablet 1    [DISCONTINUED] clopidogrel (PLAVIX) 75 MG Tab Take 1 Tablet by mouth every day. (Patient not  taking: Reported on 5/20/2025) 90 Tablet 1    [DISCONTINUED] amLODIPine (NORVASC) 5 MG Tab Take 1 Tablet by mouth every day. 90 Tablet 3    [DISCONTINUED] carvedilol (COREG) 12.5 MG Tab Take 1 Tablet by mouth 2 times a day with meals. 180 Tablet 3    [DISCONTINUED] POTASSIUM PO Take 1 Tablet by mouth every morning. Indications: OTC Supplement (Patient not taking: Reported on 5/20/2025)       No facility-administered encounter medications on file as of 5/20/2025.

## 2025-05-20 NOTE — PATIENT INSTRUCTIONS
A - Antiplatelet - Clopidogrel (PLAVIX) reduces your risk of cardiac event by 27% compared to Aspirin 81 mg daily (HOST EXAM study 2021), prasrugrel (EFFIENT), ticagrelor (BRILINTA)) may be used for the first year.  Aspirin 81 mg daily is associated with a 20% less use of heart event and is used the first year after a cardiac event, stent or CABG.  B - Blood Pressure Control - reduces your risk or heart attack and stroke, the goal is <130/80.  C - Cholesterol Management - statins dramatically reduce your risk; for those that are intolerant to statins, there are alternatives.  D - Diet - MEDITERRANEAN DIET or Cardiac rehab diets, Cardiosmart.org.  E - Exercise - at least 2.5 hours of moderate exercise weekly  (typical brisk walking or similar activity).  F - Fats - VASCEPA, or EPA Fish oil (if Vascepa too expensive) for elevated triglycerides (REDUCE IT trial showed reduction from 22% 5 year MACE to 17%).  G - Good Vibes - meditation, exercise, yoga, Pilates, mindfulness, Aric-Chi, stress reduction.  H - Heart Failure - betablockers, sucubatril (ENTRESTO) 16% less risk of dying over 3 years, spironolactone, empagliflozin (JARDIANCE) 17% less risk of dying over 2 years, CRT +/- ICD.  I - Inflammation - Colchicine in the LoDoCo2 study in 2020 reduced the risk of heart attack by 30% in 2.5 year follow up.  R - Rehab - Cardiac Rehab reduces risk of dying by 13-24% and need to go to the hospital by 30% within the first year. Compared to regular Cardiac Rehab, Intensive Cardiac Rehab ([place] [facility]) was shown to reduce the risk of major events 17% to 11% and hospitalization for CHF from 8% to 2%. (Nutrients [id number](11:0774)  S - Smoking - never smoke, if you do smoke ask for help to quit for good. Patients who quit smoking after heart attack have 36% less likely risk of dying.  Resources are 1-800-QUIT-NOW [employer](SPG) in addition to Chantix, bupropion (Zyban) or nicotine replacement  T - Type II Diabetes  - pills empagliflozin (JARDIANCE) 38% less risk of dying over 4 years, and/or weekly injections: tirzepatide (Mounjaro), semaglutide (Ozempic), liraglutide (Victoza), dulaglutide (Trulicity) ~26% less risk of MACE in 2 years.  V - Vaccines - Annual flu shot and COVID vaccine reduces the risk of serious cardiovascular complications from these deadly infections.  W - Weight - maintain a healthy weight. Semaglutide (WEGOVY) weekly injection was shown to reduce weight by 10% and heart events by 20% for patients with CAD and BMI > 27 in the SELECT trial (6.5% vs 8% in 48 month follow up [facility] 12/2023).      Work on at least 2.5 - 5 hours a week of moderate exercise    Please look into the following diets and incorporate them into your diet  LOW SALT DIET   KEEP YOUR SODIUM EQUAL TO CALORIES AND NO MORE THAN DOUBLE THE CALORIES FOR A LOW SALT DIET    Cardiosmart.org - great resource for American College of Cardiology on heart disease prevention and treatment    FOR TREATMENT OR PREVENTION OF CORONARY ARTERY DISEASE  These three programs are approved by Medicare/Insurers for those with heart disease  [facility] - [facility]  Dr. Renee's Program for Reversing Heart Disease - Morgan Umana's Cardiologist vegetarian-based  [facility] Program - [place]-based mind-body Program    Mediterranean Diet has been shown to be a hearty healthy diet.    This is a commonly referenced Program  Dr Gurrola - [place] [facility] [place] (book and documentary) - vegetarian-based    FOR TREATMENT OF BLOOD PRESSURE  DASH DIET - American Heart Association for treatment of HYPERTENSION    FOR TREATMENT OF BAD CHOLESTEROL/FATS  REDUCE PROCESSED SUGAR AS MUCH AS POSSIBLE  INCREASE WHOLE GRAINS/VEGETABLES  INCREASE FIBER    Lowering total cholesterol and LDL (bad) cholesterol:  - Eat leaner cuts of meat, or eliminate altogether if possible red meat, and frequently substitute fish or chicken.  - Limit saturated  fat to no more than 7-10% of total calories no more than 10 g per day is recommended. Some sources of saturated fat include butter, animal fats, hydrogenated vegetable fats and oils, many desserts, whole milk dairy products.  - Replaced saturated fats with polyunsaturated fats and monounsaturated fats. Foods high in monounsaturated fat include nuts, canola oil, avocados, and olives.  - Limit trans fat (processed foods) and replaced with fresh fruits and vegetables  - Recommend nonfat dairy products  - Increase substantially the amount of soluble fiber intake (legumes such as beans, fruit, whole grains).  - Consider nutritional supplements: plant sterile spreads such as Benecol, fish oil,  flaxseed oil, omega-3 acids EPA capsules 2000 mg twice a day, or viscous fiber such as Metamucil  - Attain ideal weight and regular exercise (at least 30 minutes per day of moderate exercise)  ASK ABOUT STATIN OR NON STATIN MEDICATION TO REDUCE YOUR LDL AND HEART RISK    Lowering triglycerides:  - Reduce intake of simple sugar: Desserts, candy, pastries, honey, sodas, sugared cereals, yogurt, Gatorade, sports bars, canned fruit, smoothies, fruit juice, coffee drinks  - Reduced intake of refined starches: Refined Pasta, most bread  - Reduce or abstain from alcohol  - Increase omega-3 fatty acids: [place], Trout, Mackerel, [place], [place] tuna and supplements  - Attain ideal weight and regular exercise (at least 30 minutes per day of moderate exercise)  ASK ABOUT PURIFIED OMEGA 3 EPA or FISH OIL TO REDUCE YOUR TG AND HEART RISK    Elevating HDL (good) cholesterol:  - Increase physical activity  - Increase omega-3 fatty acids and supplements as listed above  - Incorporating appropriate amounts of monounsaturated fats such as nuts, olive oil, canola oil, avocados, olives  - Stop smoking  - Attain ideal weight and regular exercise (at least 30 minutes per day of moderate exercise)

## 2025-06-16 DIAGNOSIS — E03.9 HYPOTHYROIDISM (ACQUIRED): ICD-10-CM

## 2025-06-16 DIAGNOSIS — I10 ESSENTIAL HYPERTENSION: ICD-10-CM

## 2025-06-16 RX ORDER — AMLODIPINE BESYLATE 5 MG/1
5 TABLET ORAL
Qty: 90 TABLET | Refills: 3 | OUTPATIENT
Start: 2025-06-16

## 2025-06-16 NOTE — TELEPHONE ENCOUNTER
Received request via: Patient    Was the patient seen in the last year in this department? Yes    Does the patient have an active prescription (recently filled or refills available) for medication(s) requested? No    [facility]    Does the patient have [facility]CI Plus and need 100-day supply? (This applies to ALL medications) Patient does not have SCP

## 2025-06-17 RX ORDER — LEVOTHYROXINE SODIUM 125 UG/1
125 TABLET ORAL
Qty: 90 TABLET | Refills: 1 | Status: SHIPPED | OUTPATIENT
Start: 2025-06-17

## 2025-06-17 NOTE — TELEPHONE ENCOUNTER
Received refill request from pt's wife for Levothyroxine and Carvedilol.   Pt should still have Carvedilol refills available at pharmacy.   Will inform pt to check with pharmacy.

## 2025-06-18 NOTE — TELEPHONE ENCOUNTER
Phone Number Called: [phone]    Call outcome: Spoke to patient regarding message below. Spoke to Alanna, pt's wife.     Message: Informed that Levothyroxine has been refilled, and that he should still have Amlodipine at the pharmacy.   Closing encounter.

## 2025-07-01 ENCOUNTER — APPOINTMENT (OUTPATIENT)
Dept: RADIOLOGY | Facility: MEDICAL CENTER | Age: 75
End: 2025-07-01
Attending: EMERGENCY MEDICINE
Payer: MEDICARE

## 2025-07-01 ENCOUNTER — APPOINTMENT (OUTPATIENT)
Dept: RADIOLOGY | Facility: MEDICAL CENTER | Age: 75
End: 2025-07-01
Attending: STUDENT IN AN ORGANIZED HEALTH CARE EDUCATION/TRAINING PROGRAM
Payer: MEDICARE

## 2025-07-01 ENCOUNTER — HOSPITAL ENCOUNTER (EMERGENCY)
Facility: MEDICAL CENTER | Age: 75
End: 2025-07-01
Attending: EMERGENCY MEDICINE
Payer: MEDICARE

## 2025-07-01 VITALS
DIASTOLIC BLOOD PRESSURE: 87 MMHG | HEIGHT: 71 IN | SYSTOLIC BLOOD PRESSURE: 157 MMHG | BODY MASS INDEX: 30.8 KG/M2 | HEART RATE: 71 BPM | OXYGEN SATURATION: 96 % | WEIGHT: 220 LBS | TEMPERATURE: 97.9 F | RESPIRATION RATE: 14 BRPM

## 2025-07-01 DIAGNOSIS — S09.90XA CLOSED HEAD INJURY, INITIAL ENCOUNTER: Primary | ICD-10-CM

## 2025-07-01 DIAGNOSIS — S61.216A LACERATION OF RIGHT LITTLE FINGER WITHOUT FOREIGN BODY WITHOUT DAMAGE TO NAIL, INITIAL ENCOUNTER: ICD-10-CM

## 2025-07-01 DIAGNOSIS — S63.259A DISLOCATION OF FINGER, INITIAL ENCOUNTER: ICD-10-CM

## 2025-07-01 PROCEDURE — 26770 TREAT FINGER DISLOCATION: CPT

## 2025-07-01 PROCEDURE — 304999 HCHG REPAIR-SIMPLE/INTERMED LEVEL 1

## 2025-07-01 PROCEDURE — 90715 TDAP VACCINE 7 YRS/> IM: CPT | Performed by: EMERGENCY MEDICINE

## 2025-07-01 PROCEDURE — 73140 X-RAY EXAM OF FINGER(S): CPT | Mod: RT

## 2025-07-01 PROCEDURE — 90471 IMMUNIZATION ADMIN: CPT

## 2025-07-01 PROCEDURE — 700111 HCHG RX REV CODE 636 W/ 250 OVERRIDE (IP): Performed by: EMERGENCY MEDICINE

## 2025-07-01 PROCEDURE — 72125 CT NECK SPINE W/O DYE: CPT

## 2025-07-01 PROCEDURE — 99284 EMERGENCY DEPT VISIT MOD MDM: CPT

## 2025-07-01 PROCEDURE — 303747 HCHG EXTRA SUTURE

## 2025-07-01 PROCEDURE — 70450 CT HEAD/BRAIN W/O DYE: CPT

## 2025-07-01 RX ORDER — BUPIVACAINE HYDROCHLORIDE 2.5 MG/ML
5 INJECTION, SOLUTION EPIDURAL; INFILTRATION; INTRACAUDAL; PERINEURAL ONCE
Status: COMPLETED | OUTPATIENT
Start: 2025-07-01 | End: 2025-07-01

## 2025-07-01 RX ORDER — CEPHALEXIN 500 MG/1
500 CAPSULE ORAL 3 TIMES DAILY
Qty: 21 CAPSULE | Refills: 0 | Status: ACTIVE | OUTPATIENT
Start: 2025-07-01

## 2025-07-01 RX ADMIN — BUPIVACAINE HYDROCHLORIDE 5 ML: 2.5 INJECTION, SOLUTION EPIDURAL; INFILTRATION; INTRACAUDAL; PERINEURAL at 16:32

## 2025-07-01 RX ADMIN — CLOSTRIDIUM TETANI TOXOID ANTIGEN (FORMALDEHYDE INACTIVATED), CORYNEBACTERIUM DIPHTHERIAE TOXOID ANTIGEN (FORMALDEHYDE INACTIVATED), BORDETELLA PERTUSSIS TOXOID ANTIGEN (GLUTARALDEHYDE INACTIVATED), BORDETELLA PERTUSSIS FILAMENTOUS HEMAGGLUTININ ANTIGEN (FORMALDEHYDE INACTIVATED), BORDETELLA PERTUSSIS PERTACTIN ANTIGEN, AND BORDETELLA PERTUSSIS FIMBRIAE 2/3 ANTIGEN 0.5 ML: 5; 2; 2.5; 5; 3; 5 INJECTION, SUSPENSION INTRAMUSCULAR at 15:42

## 2025-07-01 ASSESSMENT — FIBROSIS 4 INDEX: FIB4 SCORE: 1.34

## 2025-07-01 NOTE — ED PROVIDER NOTES
ER Provider Note    Scribed for Chris Salmeron D.O. by Kajal Ambriz. 7/1/2025  3:16 PM    Primary Care Provider: Alexander Arias M.D.    CHIEF COMPLAINT  Chief Complaint   Patient presents with    GLF     MGLF, pt reports he was walking in garage and tripped. +head strike, -LOC, +thinners (ASA).   Pt has small contusion on right eye brow and laceration to 5th digit on right hand.   Unknown tetanus        HPI/ROS  LIMITATION TO HISTORY   None     OUTSIDE HISTORIAN(S):  Wife at bedside    Rodney Arias is a 75 y.o. male who presents to the Emergency Department as a code TBI. Patient reports he was walking in his garage with tools when he fell forward, striking the right side of his head.  He denies any loss of consciousness.In the ED, he has a right 5th laceration. He presents associated symptoms of right sided headache. No alleviating factors noted at this time . Patient has additional history of neck surgery. Tetanus is not up to date    ROS as per HPI.    PAST MEDICAL HISTORY  Past Medical History:   Diagnosis Date    Arthritis     neck    Bronchitis     CAD (coronary artery disease) 10/21/2011    Disorder of thyroid     Dyslipidemia 10/21/2011    High cholesterol     HTN (hypertension) 10/21/2011    Indigestion     MI (myocardial infarction) (HCC) 10/21/2011    Myocardial infarction (HCC)     with stent placement    Pain     neck and shoulder    Sleep apnea     Stented coronary artery 10/21/2011       SURGICAL HISTORY  Past Surgical History:   Procedure Laterality Date    CERVICAL FUSION POSTERIOR N/A 12/12/2022    Procedure: CERVICAL 3-7 LAMINOTOMY WITH RESECTION OF INTRADURAL TUMOR AND CERVICAL 3-7 FUSION;  Surgeon: Alfonzo Wright M.D.;  Location: SURGERY [facility];  Service: Neurosurgery    LAMINOTOMY N/A 12/12/2022    Procedure: LAMINOTOMY;  Surgeon: Alfonzo Wright M.D.;  Location: SURGERY [facility];  Service: Neurosurgery    SPINAL CORD TUMOR N/A 12/12/2022    Procedure: EXCISION,  NEOPLASM, SPINAL CORD;  Surgeon: Alfonzo Wright M.D.;  Location: SURGERY [facility];  Service: Neurosurgery    ANGIOPLASTY      ZZZ CARDIAC CATH         FAMILY HISTORY  Family History   Problem Relation Age of Onset    Heart Disease Neg Hx         no premature CAD       SOCIAL HISTORY   reports that he has never smoked. He has never been exposed to tobacco smoke. He has never used smokeless tobacco. He reports current alcohol use of about 12.6 oz of alcohol per week. He reports that he does not use drugs.    CURRENT MEDICATIONS  Current Outpatient Medications   Medication Instructions    amLODIPine (NORVASC) 5 mg, Oral, EVERY DAY    ASPIRIN 81 PO 81 mg    atorvastatin (LIPITOR) 80 mg, Oral, NIGHTLY    carvedilol (COREG) 12.5 mg, Oral, 2 TIMES DAILY WITH MEALS    levothyroxine (SYNTHROID) 125 mcg, Oral, EACH MORNING ON EMPTY STOMACH    losartan (COZAAR) 100 mg, Oral, DAILY    omeprazole (PRILOSEC) 40 mg, Oral, DAILY        ALLERGIES  Shrimp (diagnostic) and Plavix [clopidogrel]    PHYSICAL EXAM  /78   Pulse 73   Temp 37.3 °C (99.1 °F) (Temporal)   Resp 18   SpO2 93%     General: No acute distress. GCS 15.   Head: Small erythema to forehead  Face: No tenderness  Neck: No tenderness  Chest: Lungs have even and unlabored respirations, Clear to auscultation.   Cardiovascular: Regular rate and regular rhythm, No peripheral cyanosis.  Abdomen: Non distended.  Neuro: Awake, Conversive, Able to relay recent events.  Psychiatric: Calm and cooperative.     .     INITIAL ASSESSMENT  Patient presented as a code TBI due to mechanism of injury, advanced age, anticoagulant/antiplatelet therapy, and/or concerns of significant brain injury, including hemorrhage. The patient was evaluated at the charge nurse station and brought stat to the CT scan to evaluate for bleed.     DIAGNOSTIC STUDIES    EKG:   I have independently interpreted the above EKG.    Radiology:   The attending emergency physician has independently  interpreted the diagnostic imaging associated with this visit and am waiting the final reading from the radiologist.   Preliminary interpretation is as follows: No bleed  Radiologist interpretation:   DX-FINGER(S) 2+ RIGHT   Final Result      Interval reduction of dislocation at the fifth digit DIP joint.      DX-FINGER(S) 2+ RIGHT   Final Result      Dorsal dislocation of the fifth MP joint with overriding. Soft tissue injury of the volar aspect of the finger and the middle phalangeal head may be exposed.      CT-CSPINE WITHOUT PLUS RECONS   Final Result      1.  Postoperative changes with laminectomy and posterior fusion with hardware fixation C3-C6.   2.  Degenerative disc disease and spondylotic changes.   3.  No acute fracture or dislocation.      CT-HEAD W/O   Final Result      1.  Cerebral atrophy.      2.  White matter lucencies most consistent with small vessel ischemic change versus demyelination or gliosis.      3. Encephalomalacic changes in the right anterior sylvian frontal lobe consistent with old cerebral infarction.      4. No evidence of acute intracranial hemorrhage.                   Laceration Repair Procedure Note    Indication: Laceration    Procedure: The patient was placed in the appropriate position and anesthesia around the laceration was obtained with a full digital block of the right short (pinkie) finger using 0.25% Bupivacaine without epinephrine. The area was then cleansed with betadine and draped in a sterile fashion. The laceration was closed with 5-0 Nylon. There were no additional lacerations requiring repair. The wound area was then dressed with a sterile dressing.      Total repaired wound length: 1 cm.     Other Items: Suture count: 3    The patient tolerated the procedure well.    Complications: None      Joint Reduction Procedure Note    Indication: Joint dislocation    Consent: The patient was counseled regarding the procedure, it's indications, risks, potential  complications and alternatives and any questions were answered. Consent was obtained.    Procedure: The pre-reduction exam showed distal perfusion & neurologic function to be normal. The patient was placed in the appropriate position. Anesthesia/pain control was obtained using a digital block of the right short (pinkie) finger using 0.25% Bupivacaine without epinephrine. Reduction of the right short (pinkie) finger DIP joint was performed by traction and counter traction. Post reduction films were obtained and revealed satisfactory reduction. A post-reduction exam revealed distal perfusion & neurologic function to be normal. The affected area was immobilized with an aluminum/ foam splint.    The patient tolerated the procedure well.    Complications: None       COURSE & MEDICAL DECISION MAKING     COURSE AND PLAN  3:16 PM - Patient seen and examined at charge desk as code TBI. Discussed plan of care, including ordering imaging. Patient agrees to the plan of care.     3:17 PM Ordered CT-head, Ct-Cspine, DX-fingers right,  for further evaluation.  Patient will be treated with Adacel 05. mL.     4:23 PM Patient was reevaluated at bedside. Discussed radiology results with the patient. Patient was treated with a digital block of Sensorcaine 3 ccs. Paged Ortho.    5:02 PM - I discussed the patient's case and the above findings with Dr. Ye (Ortho) who recommends laceration be washed and reduced.      6:31 PM - I reevaluated the patient at bedside. I discussed the patient's diagnostic study results. I discussed plan for discharge and follow up as outlined below. The patient is stable for discharge at this time and will return for any new or worsening symptoms. Patient verbalizes understanding and support with my plan for discharge.       ED Summary: The patient presented as a code TBI, he was seen initially at the nursing station and was brought straight for CT head and neck.  He did have a injury to the finger  also.    CT head neck showed no fracture no bleed.  He does have a dislocation of the distal phalanx of the small finger along with a laceration.  I spoke with Dr. Ye from orthopedist and primary reduction and closure will be done here.    Reduction and wound closure was done by our training staff under my direct supervision.  And the patient is stable for discharge home.      DISPOSITION AND DISCUSSIONS  I have discussed management of the patient with the following physicians and NELDA's: Dr. Ye (Ortho)      The patient will return for new or worsening symptoms and is stable at the time of discharge.    The patient is referred to a primary physician for blood pressure management, diabetic screening, and for all other preventative health concerns.      DISPOSITION:  Patient will be discharged home in stable condition.    FOLLOW UP:  Alexander Arias M.D.  [address] [facility] [address]  [phone]          [facility] [place] [address] [facility] [address]  [phone]          OUTPATIENT MEDICATIONS:  Discharge Medication List as of 7/1/2025  6:37 PM        START taking these medications    Details   cephALEXin (KEFLEX) 500 MG Cap Take 1 Capsule by mouth 3 times a day., Disp-21 Capsule, R-0, Normal              FINAL DIAGNOSIS  1. Closed head injury, initial encounter    2. Dislocation of finger, initial encounter    3. Laceration of right little finger without foreign body without damage to nail, initial encounter    4.  Laceration repair procedure   5.  Joint reduction procedure        Kajal SARAH), am scribing for, and in the presence of, Chris Salmeron D.O..    Electronically signed by: Kajal Jimenez), 7/1/2025    Chris SARAH D.O. personally performed the services described in this documentation, as scribed by Kajal Ambriz in my presence, and it is both accurate and complete.     The note accurately reflects work and decisions made by  me.  Chris Salmeron D.O.  7/1/2025  9:08 PM

## 2025-07-01 NOTE — ED TRIAGE NOTES
Chief Complaint   Patient presents with    GLF     MGLF, pt reports he was walking in garage and tripped. +head strike, -LOC, +thinners (ASA).   Pt has small contusion on right eye brow and laceration to 5th digit on right hand.   Unknown tetanus      Pt to charge desk from triage for above complaint. Pt activated as TBI and evaluated by Dr. Salmeron.     Pt transported to CT then blue 14H. Report to Catrachita DUDLEY.

## 2025-07-02 NOTE — ED NOTES
Patient provided vaccine information and education. Patient medicated per MAR.   
Patient sitting comfortably in chair with even and unlabored respirations. No needs at this time. Wife at bedside.     
Pt back to page from xray.   
Pt given discharge instructions/prescriptions send to pharm of choosing/ home care instructions explained, pt verbalized understanding of instructions given/pt understands importance of follow up, pt ambulatory to ER lobby.      
Xray at bedside.   
unknown

## 2025-07-02 NOTE — DISCHARGE INSTRUCTIONS
Repeat x-rays show the finger is in a normal position.  Please call the number above for renal orthopedist to make a follow-up appointment referral has been placed.  You are being placed on antibiotics to prevent this from getting infected.  Leave this dressing on for the next 2 days then remove and keep it clean keep it dry return for any signs of infection.

## 2025-07-07 PROBLEM — S61.209A OPEN DISLOCATION OF PHALANX OF HAND: Status: ACTIVE | Noted: 2025-07-07

## 2025-07-07 PROBLEM — S63.259A OPEN DISLOCATION OF PHALANX OF HAND: Status: ACTIVE | Noted: 2025-07-07

## (undated) DEVICE — SUTURE 3-0 VICRYL PLUS SH - 8X 18 INCH (12/BX)

## (undated) DEVICE — PACK NEURO - (2EA/CA)

## (undated) DEVICE — BOVIE BLADE COATED &INSULATED - 25/PK

## (undated) DEVICE — SLEEVE, VASO, THIGH, MED

## (undated) DEVICE — SUTURE 0 VICRYL PLUS CT-1 - 8 X 18 INCH (12/BX)

## (undated) DEVICE — SET EXTENSION WITH 2 PORTS (48EA/CA) ***PART #2C8610 IS A SUBSTITUTE*****

## (undated) DEVICE — MIDAS LUBRICATOR DIFFUSER PACK (4EA/CA)

## (undated) DEVICE — ARMREST CRADLE FOAM - (2PR/PK 12PR/CA)

## (undated) DEVICE — GOWN WARMING STANDARD FLEX - (30/CA)

## (undated) DEVICE — BOVIE BLADE COATED &INSULATED (50EA/PK)

## (undated) DEVICE — SLEEVE VASO CALF MED - (10PR/CA)

## (undated) DEVICE — SUCTION INSTRUMENT YANKAUER BULBOUS TIP W/O VENT (50EA/CA)

## (undated) DEVICE — KIT SURGIFLO W/OUT THROMBIN - (6EA/CA)

## (undated) DEVICE — BONE MILL BM210

## (undated) DEVICE — HEADREST PRONEVIEW LARGE - (10/CA)

## (undated) DEVICE — COVER MAYO STAND X-LG - (22EA/CA)

## (undated) DEVICE — FORCEPS ELECTROSURGICAL DISPOSABLE CODMAN 9IN 1.5MM

## (undated) DEVICE — GLOVE SZ 8 BIOGEL PI MICRO - PF LF (50PR/BX)

## (undated) DEVICE — TUBING CLEARLINK DUO-VENT - C-FLO (48EA/CA)

## (undated) DEVICE — PIN HEAD MAYFIELD DISP. (3EA/PK 12PK/BX)

## (undated) DEVICE — ELECTRODE DUAL RETURN W/ CORD - (50/PK)

## (undated) DEVICE — LACTATED RINGERS INJ 1000 ML - (14EA/CA 60CA/PF)

## (undated) DEVICE — PEN SKIN MARKER W/RULER - (50EA/BX)

## (undated) DEVICE — SUTURE 0 VICRYL PLUS UR-6 - 27 INCH (36/BX)

## (undated) DEVICE — TISSEEL 4ML ----MUST ORDER A MIN OF 6EA----

## (undated) DEVICE — DRAPE STRLE REG TOWEL 18X24 - (10/BX 4BX/CA)"

## (undated) DEVICE — INTRAOP NEURO IN OR 1:1 PER 15 MIN

## (undated) DEVICE — CANISTER SUCTION 3000ML MECHANICAL FILTER AUTO SHUTOFF MEDI-VAC NONSTERILE LF DISP  (40EA/CA)

## (undated) DEVICE — SENSOR OXIMETER ADULT SPO2 RD SET (20EA/BX)

## (undated) DEVICE — TRAY CATHETER FOLEY URINE METER W/STATLOCK 350ML (10EA/CA)

## (undated) DEVICE — CORDS BIPOLAR COAGULATION - 12FT STERILE DISP. (10EA/BX)

## (undated) DEVICE — DRAPE LAPAROTOMY T SHEET - (12EA/CA)

## (undated) DEVICE — SPONGE KITTNER DISSECTORS - (5EA/PK 50PK/CA)

## (undated) DEVICE — TOOL MR8 14CM MATCH HD SYM-TRI 3MM DIAMETER (1/EA)

## (undated) DEVICE — PACK UNIVERSAL SURGICAL ECLIPSE 1 (5EA/CA)

## (undated) DEVICE — FORCEPS ELECTROSURGICAL DISPOSABLE CODMAN 8IN 1.5MM

## (undated) DEVICE — CHLORAPREP 26 ML APPLICATOR - ORANGE TINT(25/CA)

## (undated) DEVICE — SODIUM CHL IRRIGATION 0.9% 1000ML (12EA/CA)

## (undated) DEVICE — BLADE SURGICAL CLIPPER - (50EA/CA)

## (undated) DEVICE — LACTATED RINGERS INJ. 500 ML - (24EA/CA)

## (undated) DEVICE — KIT EVACUATER 3 SPRING PVC LF 1/8 DRAIN SIZE (10EA/CA)"

## (undated) DEVICE — TUBING SMOKE EVACUACTION 10 (10EA/PK)"

## (undated) DEVICE — SUTURE 2-0 VICRYL PLUS CT-1 - 8 X 18 INCH(12/BX)

## (undated) DEVICE — SET LEADWIRE 5 LEAD BEDSIDE DISPOSABLE ECG (1SET OF 5/EA)

## (undated) DEVICE — PATTIES SURG X-RAYCOTTONOID - 1/2 X 3 IN (200/CA)

## (undated) DEVICE — SPONGE GAUZESTER 4 X 4 4PLY - (128PK/CA)

## (undated) DEVICE — COVER LIGHT HANDLE ALC PLUS DISP (18EA/BX)

## (undated) DEVICE — TOWEL STOP TIMEOUT SAFETY FLAG (40EA/CA)

## (undated) DEVICE — SUTURE GENERAL